# Patient Record
Sex: FEMALE | Race: WHITE | NOT HISPANIC OR LATINO | Employment: OTHER | URBAN - NONMETROPOLITAN AREA
[De-identification: names, ages, dates, MRNs, and addresses within clinical notes are randomized per-mention and may not be internally consistent; named-entity substitution may affect disease eponyms.]

---

## 2024-04-13 ENCOUNTER — APPOINTMENT (OUTPATIENT)
Dept: RADIOLOGY | Facility: HOSPITAL | Age: 69
DRG: 180 | End: 2024-04-13
Payer: MEDICARE

## 2024-04-13 ENCOUNTER — APPOINTMENT (OUTPATIENT)
Dept: CARDIOLOGY | Facility: HOSPITAL | Age: 69
DRG: 180 | End: 2024-04-13
Payer: MEDICARE

## 2024-04-13 ENCOUNTER — HOSPITAL ENCOUNTER (INPATIENT)
Facility: HOSPITAL | Age: 69
LOS: 9 days | Discharge: HOSPICE | DRG: 180 | End: 2024-04-25
Attending: EMERGENCY MEDICINE | Admitting: INTERNAL MEDICINE
Payer: MEDICARE

## 2024-04-13 DIAGNOSIS — R09.02 HYPOXEMIA: ICD-10-CM

## 2024-04-13 DIAGNOSIS — D64.9 ANEMIA, UNSPECIFIED TYPE: ICD-10-CM

## 2024-04-13 DIAGNOSIS — C79.89 SECONDARY MALIGNANT NEOPLASM OF OTHER SPECIFIED SITES (MULTI): ICD-10-CM

## 2024-04-13 DIAGNOSIS — J18.9 PNEUMONIA DUE TO INFECTIOUS ORGANISM, UNSPECIFIED LATERALITY, UNSPECIFIED PART OF LUNG: ICD-10-CM

## 2024-04-13 DIAGNOSIS — R90.89 ABNORMAL FINDING ON MRI OF BRAIN: ICD-10-CM

## 2024-04-13 DIAGNOSIS — J43.9 PULMONARY EMPHYSEMA, UNSPECIFIED EMPHYSEMA TYPE (MULTI): ICD-10-CM

## 2024-04-13 DIAGNOSIS — C34.90 PRIMARY MALIGNANT NEOPLASM OF LUNG METASTATIC TO OTHER SITE, UNSPECIFIED LATERALITY (MULTI): ICD-10-CM

## 2024-04-13 DIAGNOSIS — R55 SYNCOPE, NEAR: Primary | ICD-10-CM

## 2024-04-13 LAB
ALBUMIN SERPL BCP-MCNC: 2.9 G/DL (ref 3.4–5)
ALP SERPL-CCNC: 105 U/L (ref 33–136)
ALT SERPL W P-5'-P-CCNC: 9 U/L (ref 7–45)
ANION GAP BLDV CALCULATED.4IONS-SCNC: 13 MMOL/L (ref 10–25)
ANION GAP SERPL CALC-SCNC: 18 MMOL/L (ref 10–20)
AST SERPL W P-5'-P-CCNC: 13 U/L (ref 9–39)
BASE EXCESS BLDV CALC-SCNC: 2.9 MMOL/L (ref -2–3)
BASOPHILS # BLD AUTO: 0.03 X10*3/UL (ref 0–0.1)
BASOPHILS NFR BLD AUTO: 0.5 %
BILIRUB SERPL-MCNC: 1 MG/DL (ref 0–1.2)
BODY TEMPERATURE: ABNORMAL
BUN SERPL-MCNC: 15 MG/DL (ref 6–23)
CA-I BLDV-SCNC: 1.26 MMOL/L (ref 1.1–1.33)
CALCIUM SERPL-MCNC: 10 MG/DL (ref 8.6–10.3)
CARDIAC TROPONIN I PNL SERPL HS: 86 NG/L (ref 0–13)
CHLORIDE BLDV-SCNC: 97 MMOL/L (ref 98–107)
CHLORIDE SERPL-SCNC: 96 MMOL/L (ref 98–107)
CO2 SERPL-SCNC: 24 MMOL/L (ref 21–32)
CREAT SERPL-MCNC: 0.78 MG/DL (ref 0.5–1.05)
EGFRCR SERPLBLD CKD-EPI 2021: 83 ML/MIN/1.73M*2
EOSINOPHIL # BLD AUTO: 0.09 X10*3/UL (ref 0–0.7)
EOSINOPHIL NFR BLD AUTO: 1.4 %
ERYTHROCYTE [DISTWIDTH] IN BLOOD BY AUTOMATED COUNT: 18.5 % (ref 11.5–14.5)
FLUAV RNA RESP QL NAA+PROBE: NOT DETECTED
FLUBV RNA RESP QL NAA+PROBE: NOT DETECTED
GIANT PLATELETS BLD QL SMEAR: NORMAL
GLUCOSE BLDV-MCNC: 148 MG/DL (ref 74–99)
GLUCOSE SERPL-MCNC: 148 MG/DL (ref 74–99)
HCO3 BLDV-SCNC: 27.1 MMOL/L (ref 22–26)
HCT VFR BLD AUTO: 26 % (ref 36–46)
HCT VFR BLD EST: 26 % (ref 36–46)
HGB BLD-MCNC: 8.1 G/DL (ref 12–16)
HGB BLDV-MCNC: 8.8 G/DL (ref 12–16)
HOLD SPECIMEN: NORMAL
HOLD SPECIMEN: NORMAL
IMM GRANULOCYTES # BLD AUTO: 0.24 X10*3/UL (ref 0–0.7)
IMM GRANULOCYTES NFR BLD AUTO: 3.6 % (ref 0–0.9)
INHALED O2 CONCENTRATION: 32 %
LACTATE BLDV-SCNC: 6.4 MMOL/L (ref 0.4–2)
LACTATE SERPL-SCNC: 5.3 MMOL/L (ref 0.4–2)
LYMPHOCYTES # BLD AUTO: 0.8 X10*3/UL (ref 1.2–4.8)
LYMPHOCYTES NFR BLD AUTO: 12 %
MAGNESIUM SERPL-MCNC: 1.91 MG/DL (ref 1.6–2.4)
MCH RBC QN AUTO: 32.4 PG (ref 26–34)
MCHC RBC AUTO-ENTMCNC: 31.2 G/DL (ref 32–36)
MCV RBC AUTO: 104 FL (ref 80–100)
MONOCYTES # BLD AUTO: 0.86 X10*3/UL (ref 0.1–1)
MONOCYTES NFR BLD AUTO: 12.9 %
NEUTROPHILS # BLD AUTO: 4.64 X10*3/UL (ref 1.2–7.7)
NEUTROPHILS NFR BLD AUTO: 69.6 %
NRBC BLD-RTO: 0.6 /100 WBCS (ref 0–0)
OVALOCYTES BLD QL SMEAR: NORMAL
OXYHGB MFR BLDV: 80.2 % (ref 45–75)
PCO2 BLDV: 39 MM HG (ref 41–51)
PH BLDV: 7.45 PH (ref 7.33–7.43)
PLATELET # BLD AUTO: 93 X10*3/UL (ref 150–450)
PO2 BLDV: 58 MM HG (ref 35–45)
POLYCHROMASIA BLD QL SMEAR: NORMAL
POTASSIUM BLDV-SCNC: 3.9 MMOL/L (ref 3.5–5.3)
POTASSIUM SERPL-SCNC: 3 MMOL/L (ref 3.5–5.3)
PROT SERPL-MCNC: 5.8 G/DL (ref 6.4–8.2)
RBC # BLD AUTO: 2.5 X10*6/UL (ref 4–5.2)
RBC MORPH BLD: NORMAL
SAO2 % BLDV: 90 % (ref 45–75)
SARS-COV-2 RNA RESP QL NAA+PROBE: NOT DETECTED
SODIUM BLDV-SCNC: 133 MMOL/L (ref 136–145)
SODIUM SERPL-SCNC: 135 MMOL/L (ref 136–145)
WBC # BLD AUTO: 6.7 X10*3/UL (ref 4.4–11.3)

## 2024-04-13 PROCEDURE — 2500000005 HC RX 250 GENERAL PHARMACY W/O HCPCS: Performed by: EMERGENCY MEDICINE

## 2024-04-13 PROCEDURE — 96366 THER/PROPH/DIAG IV INF ADDON: CPT

## 2024-04-13 PROCEDURE — 84132 ASSAY OF SERUM POTASSIUM: CPT | Mod: 91 | Performed by: EMERGENCY MEDICINE

## 2024-04-13 PROCEDURE — 93005 ELECTROCARDIOGRAM TRACING: CPT

## 2024-04-13 PROCEDURE — 83735 ASSAY OF MAGNESIUM: CPT | Performed by: EMERGENCY MEDICINE

## 2024-04-13 PROCEDURE — 2500000004 HC RX 250 GENERAL PHARMACY W/ HCPCS (ALT 636 FOR OP/ED): Performed by: EMERGENCY MEDICINE

## 2024-04-13 PROCEDURE — 99285 EMERGENCY DEPT VISIT HI MDM: CPT | Mod: 25

## 2024-04-13 PROCEDURE — 71260 CT THORAX DX C+: CPT | Performed by: RADIOLOGY

## 2024-04-13 PROCEDURE — 74177 CT ABD & PELVIS W/CONTRAST: CPT | Performed by: RADIOLOGY

## 2024-04-13 PROCEDURE — 36415 COLL VENOUS BLD VENIPUNCTURE: CPT | Performed by: EMERGENCY MEDICINE

## 2024-04-13 PROCEDURE — 70450 CT HEAD/BRAIN W/O DYE: CPT

## 2024-04-13 PROCEDURE — 96365 THER/PROPH/DIAG IV INF INIT: CPT | Mod: 59

## 2024-04-13 PROCEDURE — 87636 SARSCOV2 & INF A&B AMP PRB: CPT | Performed by: EMERGENCY MEDICINE

## 2024-04-13 PROCEDURE — 83880 ASSAY OF NATRIURETIC PEPTIDE: CPT | Performed by: STUDENT IN AN ORGANIZED HEALTH CARE EDUCATION/TRAINING PROGRAM

## 2024-04-13 PROCEDURE — 70450 CT HEAD/BRAIN W/O DYE: CPT | Performed by: RADIOLOGY

## 2024-04-13 PROCEDURE — 85025 COMPLETE CBC W/AUTO DIFF WBC: CPT | Performed by: EMERGENCY MEDICINE

## 2024-04-13 PROCEDURE — 84132 ASSAY OF SERUM POTASSIUM: CPT | Performed by: EMERGENCY MEDICINE

## 2024-04-13 PROCEDURE — 83605 ASSAY OF LACTIC ACID: CPT | Performed by: EMERGENCY MEDICINE

## 2024-04-13 PROCEDURE — 2550000001 HC RX 255 CONTRASTS: Performed by: EMERGENCY MEDICINE

## 2024-04-13 PROCEDURE — 84484 ASSAY OF TROPONIN QUANT: CPT | Performed by: EMERGENCY MEDICINE

## 2024-04-13 PROCEDURE — 74177 CT ABD & PELVIS W/CONTRAST: CPT

## 2024-04-13 RX ORDER — NALOXONE HYDROCHLORIDE 4 MG/.1ML
4 SPRAY NASAL AS NEEDED
COMMUNITY
Start: 2024-03-23

## 2024-04-13 RX ORDER — GABAPENTIN 300 MG/1
300 CAPSULE ORAL 2 TIMES DAILY
COMMUNITY
Start: 2024-03-23 | End: 2024-06-21

## 2024-04-13 RX ORDER — HYDROMORPHONE HYDROCHLORIDE 16 MG/1
16 TABLET, EXTENDED RELEASE ORAL
COMMUNITY
Start: 2024-04-10 | End: 2024-05-10

## 2024-04-13 RX ORDER — HYDROMORPHONE HYDROCHLORIDE 2 MG/1
2-4 TABLET ORAL EVERY 4 HOURS PRN
COMMUNITY
Start: 2024-04-10 | End: 2024-04-25 | Stop reason: HOSPADM

## 2024-04-13 RX ORDER — ALBUTEROL SULFATE 90 UG/1
2 AEROSOL, METERED RESPIRATORY (INHALATION) EVERY 4 HOURS PRN
Status: ON HOLD | COMMUNITY
Start: 2024-03-23 | End: 2024-04-16

## 2024-04-13 RX ORDER — METHOCARBAMOL 500 MG/1
500 TABLET, FILM COATED ORAL
Status: ON HOLD | COMMUNITY
Start: 2024-03-25 | End: 2024-04-16

## 2024-04-13 RX ORDER — POTASSIUM CHLORIDE 14.9 MG/ML
20 INJECTION INTRAVENOUS ONCE
Status: COMPLETED | OUTPATIENT
Start: 2024-04-13 | End: 2024-04-14

## 2024-04-13 RX ADMIN — POTASSIUM CHLORIDE 20 MEQ: 14.9 INJECTION, SOLUTION INTRAVENOUS at 22:20

## 2024-04-13 RX ADMIN — IOHEXOL 75 ML: 350 INJECTION, SOLUTION INTRAVENOUS at 22:06

## 2024-04-13 RX ADMIN — SODIUM CHLORIDE 1000 ML: 9 INJECTION, SOLUTION INTRAVENOUS at 22:20

## 2024-04-13 RX ADMIN — Medication: at 23:55

## 2024-04-13 ASSESSMENT — PAIN DESCRIPTION - PAIN TYPE: TYPE: ACUTE PAIN

## 2024-04-13 ASSESSMENT — COLUMBIA-SUICIDE SEVERITY RATING SCALE - C-SSRS
6. HAVE YOU EVER DONE ANYTHING, STARTED TO DO ANYTHING, OR PREPARED TO DO ANYTHING TO END YOUR LIFE?: NO
1. IN THE PAST MONTH, HAVE YOU WISHED YOU WERE DEAD OR WISHED YOU COULD GO TO SLEEP AND NOT WAKE UP?: NO
2. HAVE YOU ACTUALLY HAD ANY THOUGHTS OF KILLING YOURSELF?: NO

## 2024-04-13 ASSESSMENT — PAIN SCALES - GENERAL: PAINLEVEL_OUTOF10: 5 - MODERATE PAIN

## 2024-04-13 ASSESSMENT — PAIN - FUNCTIONAL ASSESSMENT: PAIN_FUNCTIONAL_ASSESSMENT: 0-10

## 2024-04-14 LAB
ALBUMIN SERPL BCP-MCNC: 2.4 G/DL (ref 3.4–5)
ALP SERPL-CCNC: 78 U/L (ref 33–136)
ALT SERPL W P-5'-P-CCNC: 7 U/L (ref 7–45)
ANION GAP SERPL CALC-SCNC: 10 MMOL/L (ref 10–20)
APPEARANCE UR: CLEAR
AST SERPL W P-5'-P-CCNC: 6 U/L (ref 9–39)
BILIRUB SERPL-MCNC: 0.7 MG/DL (ref 0–1.2)
BILIRUB UR STRIP.AUTO-MCNC: NEGATIVE MG/DL
BNP SERPL-MCNC: 107 PG/ML (ref 0–99)
BUN SERPL-MCNC: 12 MG/DL (ref 6–23)
CALCIUM SERPL-MCNC: 8.9 MG/DL (ref 8.6–10.3)
CARDIAC TROPONIN I PNL SERPL HS: 80 NG/L (ref 0–13)
CARDIAC TROPONIN I PNL SERPL HS: 92 NG/L (ref 0–13)
CARDIAC TROPONIN I PNL SERPL HS: 98 NG/L (ref 0–13)
CHLORIDE SERPL-SCNC: 102 MMOL/L (ref 98–107)
CO2 SERPL-SCNC: 29 MMOL/L (ref 21–32)
COLOR UR: YELLOW
CREAT SERPL-MCNC: 0.58 MG/DL (ref 0.5–1.05)
EGFRCR SERPLBLD CKD-EPI 2021: >90 ML/MIN/1.73M*2
GLUCOSE SERPL-MCNC: 80 MG/DL (ref 74–99)
GLUCOSE UR STRIP.AUTO-MCNC: NEGATIVE MG/DL
KETONES UR STRIP.AUTO-MCNC: NEGATIVE MG/DL
LACTATE SERPL-SCNC: 1.4 MMOL/L (ref 0.4–2)
LEUKOCYTE ESTERASE UR QL STRIP.AUTO: NEGATIVE
MUCOUS THREADS #/AREA URNS AUTO: NORMAL /LPF
NITRITE UR QL STRIP.AUTO: NEGATIVE
PH UR STRIP.AUTO: 6.5 [PH]
POTASSIUM SERPL-SCNC: 3.3 MMOL/L (ref 3.5–5.3)
PROT SERPL-MCNC: 4.5 G/DL (ref 6.4–8.2)
PROT UR STRIP.AUTO-MCNC: ABNORMAL MG/DL
RBC # UR STRIP.AUTO: NEGATIVE /UL
RBC #/AREA URNS AUTO: NORMAL /HPF
SODIUM SERPL-SCNC: 138 MMOL/L (ref 136–145)
SP GR UR STRIP.AUTO: <1.005
UROBILINOGEN UR STRIP.AUTO-MCNC: ABNORMAL MG/DL
WBC #/AREA URNS AUTO: NORMAL /HPF

## 2024-04-14 PROCEDURE — 84484 ASSAY OF TROPONIN QUANT: CPT | Performed by: EMERGENCY MEDICINE

## 2024-04-14 PROCEDURE — 96375 TX/PRO/DX INJ NEW DRUG ADDON: CPT

## 2024-04-14 PROCEDURE — 80053 COMPREHEN METABOLIC PANEL: CPT | Performed by: STUDENT IN AN ORGANIZED HEALTH CARE EDUCATION/TRAINING PROGRAM

## 2024-04-14 PROCEDURE — 2500000002 HC RX 250 W HCPCS SELF ADMINISTERED DRUGS (ALT 637 FOR MEDICARE OP, ALT 636 FOR OP/ED)

## 2024-04-14 PROCEDURE — 36415 COLL VENOUS BLD VENIPUNCTURE: CPT | Performed by: EMERGENCY MEDICINE

## 2024-04-14 PROCEDURE — 81001 URINALYSIS AUTO W/SCOPE: CPT | Performed by: EMERGENCY MEDICINE

## 2024-04-14 PROCEDURE — 2500000005 HC RX 250 GENERAL PHARMACY W/O HCPCS: Performed by: EMERGENCY MEDICINE

## 2024-04-14 PROCEDURE — 84484 ASSAY OF TROPONIN QUANT: CPT | Mod: 91 | Performed by: STUDENT IN AN ORGANIZED HEALTH CARE EDUCATION/TRAINING PROGRAM

## 2024-04-14 PROCEDURE — 96361 HYDRATE IV INFUSION ADD-ON: CPT

## 2024-04-14 PROCEDURE — 83605 ASSAY OF LACTIC ACID: CPT | Performed by: EMERGENCY MEDICINE

## 2024-04-14 PROCEDURE — 2500000001 HC RX 250 WO HCPCS SELF ADMINISTERED DRUGS (ALT 637 FOR MEDICARE OP): Performed by: STUDENT IN AN ORGANIZED HEALTH CARE EDUCATION/TRAINING PROGRAM

## 2024-04-14 PROCEDURE — 2500000004 HC RX 250 GENERAL PHARMACY W/ HCPCS (ALT 636 FOR OP/ED)

## 2024-04-14 PROCEDURE — 2500000001 HC RX 250 WO HCPCS SELF ADMINISTERED DRUGS (ALT 637 FOR MEDICARE OP): Performed by: EMERGENCY MEDICINE

## 2024-04-14 PROCEDURE — 2500000004 HC RX 250 GENERAL PHARMACY W/ HCPCS (ALT 636 FOR OP/ED): Performed by: STUDENT IN AN ORGANIZED HEALTH CARE EDUCATION/TRAINING PROGRAM

## 2024-04-14 RX ORDER — POTASSIUM CHLORIDE 20 MEQ/1
20 TABLET, EXTENDED RELEASE ORAL ONCE
Status: COMPLETED | OUTPATIENT
Start: 2024-04-14 | End: 2024-04-14

## 2024-04-14 RX ORDER — HYDROMORPHONE HYDROCHLORIDE 16 MG/1
16 TABLET, EXTENDED RELEASE ORAL
Status: DISCONTINUED | OUTPATIENT
Start: 2024-04-15 | End: 2024-04-25 | Stop reason: HOSPADM

## 2024-04-14 RX ORDER — HYDROMORPHONE HYDROCHLORIDE 1 MG/ML
INJECTION, SOLUTION INTRAMUSCULAR; INTRAVENOUS; SUBCUTANEOUS
Status: COMPLETED
Start: 2024-04-14 | End: 2024-04-14

## 2024-04-14 RX ORDER — HYDROMORPHONE HYDROCHLORIDE 2 MG/1
16 TABLET ORAL ONCE
Status: DISCONTINUED | OUTPATIENT
Start: 2024-04-14 | End: 2024-04-14

## 2024-04-14 RX ORDER — GABAPENTIN 300 MG/1
300 CAPSULE ORAL NIGHTLY
Status: DISCONTINUED | OUTPATIENT
Start: 2024-04-14 | End: 2024-04-18

## 2024-04-14 RX ORDER — CLINDAMYCIN HYDROCHLORIDE 300 MG/1
300 CAPSULE ORAL 3 TIMES DAILY
Qty: 30 CAPSULE | Refills: 0 | Status: SHIPPED | OUTPATIENT
Start: 2024-04-14 | End: 2024-04-14 | Stop reason: ENTERED-IN-ERROR

## 2024-04-14 RX ORDER — METHOCARBAMOL 500 MG/1
500 TABLET, FILM COATED ORAL EVERY 8 HOURS SCHEDULED
Status: DISCONTINUED | OUTPATIENT
Start: 2024-04-14 | End: 2024-04-14

## 2024-04-14 RX ORDER — HYDROMORPHONE HYDROCHLORIDE 2 MG/1
2 TABLET ORAL EVERY 4 HOURS PRN
Status: DISCONTINUED | OUTPATIENT
Start: 2024-04-14 | End: 2024-04-24

## 2024-04-14 RX ORDER — NAPROXEN SODIUM 220 MG/1
324 TABLET, FILM COATED ORAL ONCE
Status: COMPLETED | OUTPATIENT
Start: 2024-04-14 | End: 2024-04-14

## 2024-04-14 RX ORDER — METHOCARBAMOL 500 MG/1
500 TABLET, FILM COATED ORAL NIGHTLY
Status: DISCONTINUED | OUTPATIENT
Start: 2024-04-14 | End: 2024-04-16

## 2024-04-14 RX ORDER — KETOROLAC TROMETHAMINE 10 MG/1
10 TABLET, FILM COATED ORAL EVERY 6 HOURS PRN
Qty: 20 TABLET | Refills: 0 | Status: SHIPPED | OUTPATIENT
Start: 2024-04-14 | End: 2024-04-14 | Stop reason: ENTERED-IN-ERROR

## 2024-04-14 RX ORDER — FENTANYL CITRATE 50 UG/ML
100 INJECTION, SOLUTION INTRAMUSCULAR; INTRAVENOUS ONCE
Status: COMPLETED | OUTPATIENT
Start: 2024-04-14 | End: 2024-04-14

## 2024-04-14 RX ORDER — FENTANYL CITRATE 50 UG/ML
INJECTION, SOLUTION INTRAMUSCULAR; INTRAVENOUS
Status: COMPLETED
Start: 2024-04-14 | End: 2024-04-14

## 2024-04-14 RX ORDER — POTASSIUM CHLORIDE 20 MEQ/1
TABLET, EXTENDED RELEASE ORAL
Status: COMPLETED
Start: 2024-04-14 | End: 2024-04-14

## 2024-04-14 RX ORDER — GABAPENTIN 300 MG/1
300 CAPSULE ORAL 3 TIMES DAILY PRN
Status: DISCONTINUED | OUTPATIENT
Start: 2024-04-14 | End: 2024-04-20

## 2024-04-14 RX ORDER — METHOCARBAMOL 500 MG/1
TABLET, FILM COATED ORAL
Status: DISPENSED
Start: 2024-04-14 | End: 2024-04-14

## 2024-04-14 RX ORDER — HYDROMORPHONE HYDROCHLORIDE 1 MG/ML
1 INJECTION, SOLUTION INTRAMUSCULAR; INTRAVENOUS; SUBCUTANEOUS ONCE
Status: COMPLETED | OUTPATIENT
Start: 2024-04-14 | End: 2024-04-14

## 2024-04-14 RX ADMIN — GABAPENTIN 300 MG: 300 CAPSULE ORAL at 01:02

## 2024-04-14 RX ADMIN — METHOCARBAMOL 500 MG: 500 TABLET ORAL at 21:22

## 2024-04-14 RX ADMIN — GABAPENTIN 300 MG: 300 CAPSULE ORAL at 10:42

## 2024-04-14 RX ADMIN — HYDROMORPHONE HYDROCHLORIDE 1 MG: 1 INJECTION, SOLUTION INTRAMUSCULAR; INTRAVENOUS; SUBCUTANEOUS at 09:56

## 2024-04-14 RX ADMIN — Medication: at 20:00

## 2024-04-14 RX ADMIN — POTASSIUM CHLORIDE 20 MEQ: 20 TABLET, EXTENDED RELEASE ORAL at 11:55

## 2024-04-14 RX ADMIN — FENTANYL CITRATE 100 MCG: 50 INJECTION, SOLUTION INTRAMUSCULAR; INTRAVENOUS at 09:31

## 2024-04-14 RX ADMIN — HYDROMORPHONE HYDROCHLORIDE 16 MG: 2 TABLET ORAL at 09:25

## 2024-04-14 RX ADMIN — FENTANYL CITRATE 100 MCG: 50 INJECTION INTRAMUSCULAR; INTRAVENOUS at 09:31

## 2024-04-14 RX ADMIN — POTASSIUM CHLORIDE 20 MEQ: 1500 TABLET, EXTENDED RELEASE ORAL at 11:55

## 2024-04-14 RX ADMIN — GABAPENTIN 300 MG: 300 CAPSULE ORAL at 21:22

## 2024-04-14 RX ADMIN — SODIUM CHLORIDE 1000 ML: 9 INJECTION, SOLUTION INTRAVENOUS at 09:59

## 2024-04-14 RX ADMIN — HYDROMORPHONE HYDROCHLORIDE 2 MG: 2 TABLET ORAL at 18:05

## 2024-04-14 RX ADMIN — ASPIRIN 81 MG CHEWABLE TABLET 324 MG: 81 TABLET CHEWABLE at 03:18

## 2024-04-14 ASSESSMENT — PAIN SCALES - GENERAL
PAINLEVEL_OUTOF10: 0 - NO PAIN
PAINLEVEL_OUTOF10: 10 - WORST POSSIBLE PAIN
PAINLEVEL_OUTOF10: 0 - NO PAIN
PAINLEVEL_OUTOF10: 8

## 2024-04-14 ASSESSMENT — PAIN DESCRIPTION - LOCATION
LOCATION: BREAST
LOCATION: ABDOMEN

## 2024-04-14 ASSESSMENT — PAIN SCALES - WONG BAKER: WONGBAKER_NUMERICALRESPONSE: NO HURT

## 2024-04-14 ASSESSMENT — PAIN DESCRIPTION - ORIENTATION: ORIENTATION: RIGHT

## 2024-04-14 ASSESSMENT — PAIN - FUNCTIONAL ASSESSMENT
PAIN_FUNCTIONAL_ASSESSMENT: WONG-BAKER FACES
PAIN_FUNCTIONAL_ASSESSMENT: 0-10
PAIN_FUNCTIONAL_ASSESSMENT: 0-10

## 2024-04-14 NOTE — ED PROVIDER NOTES
HPI   Chief Complaint   Patient presents with    Syncope       Patient has known metastatic lung cancer, thought to be small cell, and being treated at [facility].  She came in today by EMS because of 2 episodes of what sounds to be vasovagal syncope while trying to sit on the toilet.  This also happened a couple weeks ago 1 time.  She denies any headache chest pain or shortness of breath but is very weak and has not been eating or drinking much.  The episode started about the same time she was started on Dilaudid at home.  She currently takes that once a day 16 mg Dilaudid extended release as well as 1 to 2 mg orally every 3-4 hours for breakthrough pain.  Her  requested transfer to [facility] and I explained that we have to do a few tests before we pursue that.                        Sharee Coma Scale Score: 15                     Patient History   History reviewed. No pertinent past medical history.  Past Surgical History:   Procedure Laterality Date    APPENDECTOMY  01/29/2016    Appendectomy    LUNG LOBECTOMY  01/29/2016    Lung Lobectomy    OTHER SURGICAL HISTORY  11/15/2013    Thoracoscopy (Therapeutic)     No family history on file.  Social History     Tobacco Use    Smoking status: Never    Smokeless tobacco: Current   Substance Use Topics    Alcohol use: Never    Drug use: Never       Physical Exam   ED Triage Vitals   Temperature Heart Rate Respirations BP   04/13/24 2003 04/13/24 2003 04/13/24 2003 04/13/24 2003   36.9 °C (98.4 °F) (!) 127 19 (!) 123/95      Pulse Ox Temp Source Heart Rate Source Patient Position   04/13/24 2003 04/13/24 2003 04/13/24 2056 04/13/24 2056   (!) 88 % Temporal Monitor Lying      BP Location FiO2 (%)     04/13/24 2056 04/13/24 2037     Right arm 32 %       Physical Exam  Constitutional:       Appearance: She is ill-appearing.   HENT:      Head: Normocephalic and atraumatic.   Eyes:      Extraocular Movements: Extraocular movements intact.      Pupils:  Pupils are equal, round, and reactive to light.   Cardiovascular:      Rate and Rhythm: Normal rate and regular rhythm.   Pulmonary:      Breath sounds: Rhonchi present.   Abdominal:      General: Abdomen is flat. Bowel sounds are normal.   Musculoskeletal:         General: Normal range of motion.   Skin:     Coloration: Skin is pale.   Neurological:      Mental Status: She is oriented to person, place, and time.         ED Course & MDM   Diagnoses as of 04/14/24 0253   Pain, dental       Medical Decision Making      Procedure  Procedures     Angel Coronado MD  04/13/24 4297       Angel Coronado MD  04/14/24 0134       Angel Coronado MD  04/14/24 0401

## 2024-04-15 LAB
ABO GROUP (TYPE) IN BLOOD: NORMAL
ABO GROUP (TYPE) IN BLOOD: NORMAL
ANION GAP SERPL CALC-SCNC: 13 MMOL/L (ref 10–20)
ANION GAP SERPL CALC-SCNC: 9 MMOL/L (ref 10–20)
ANTIBODY SCREEN: NORMAL
BASOPHILS # BLD AUTO: 0.03 X10*3/UL (ref 0–0.1)
BASOPHILS NFR BLD AUTO: 0.4 %
BLOOD EXPIRATION DATE: NORMAL
BLOOD EXPIRATION DATE: NORMAL
BUN SERPL-MCNC: 10 MG/DL (ref 6–23)
BUN SERPL-MCNC: 9 MG/DL (ref 6–23)
CALCIUM SERPL-MCNC: 9.2 MG/DL (ref 8.6–10.3)
CALCIUM SERPL-MCNC: 9.4 MG/DL (ref 8.6–10.3)
CARDIAC TROPONIN I PNL SERPL HS: 154 NG/L (ref 0–13)
CHLORIDE SERPL-SCNC: 102 MMOL/L (ref 98–107)
CHLORIDE SERPL-SCNC: 103 MMOL/L (ref 98–107)
CO2 SERPL-SCNC: 26 MMOL/L (ref 21–32)
CO2 SERPL-SCNC: 29 MMOL/L (ref 21–32)
CREAT SERPL-MCNC: 0.56 MG/DL (ref 0.5–1.05)
CREAT SERPL-MCNC: 0.63 MG/DL (ref 0.5–1.05)
DISPENSE STATUS: NORMAL
DISPENSE STATUS: NORMAL
EGFRCR SERPLBLD CKD-EPI 2021: >90 ML/MIN/1.73M*2
EGFRCR SERPLBLD CKD-EPI 2021: >90 ML/MIN/1.73M*2
EOSINOPHIL # BLD AUTO: 0.22 X10*3/UL (ref 0–0.7)
EOSINOPHIL NFR BLD AUTO: 3.3 %
ERYTHROCYTE [DISTWIDTH] IN BLOOD BY AUTOMATED COUNT: 18.6 % (ref 11.5–14.5)
ERYTHROCYTE [DISTWIDTH] IN BLOOD BY AUTOMATED COUNT: 23 % (ref 11.5–14.5)
ERYTHROCYTE [DISTWIDTH] IN BLOOD BY AUTOMATED COUNT: 23.2 % (ref 11.5–14.5)
GLUCOSE SERPL-MCNC: 88 MG/DL (ref 74–99)
GLUCOSE SERPL-MCNC: 96 MG/DL (ref 74–99)
HCT VFR BLD AUTO: 18.8 % (ref 36–46)
HCT VFR BLD AUTO: 26.4 % (ref 36–46)
HCT VFR BLD AUTO: 27 % (ref 36–46)
HGB BLD-MCNC: 5.9 G/DL (ref 12–16)
HGB BLD-MCNC: 8.5 G/DL (ref 12–16)
HGB BLD-MCNC: 8.9 G/DL (ref 12–16)
IMM GRANULOCYTES # BLD AUTO: 0.29 X10*3/UL (ref 0–0.7)
IMM GRANULOCYTES NFR BLD AUTO: 4.3 % (ref 0–0.9)
LACTATE SERPL-SCNC: 1.1 MMOL/L (ref 0.4–2)
LYMPHOCYTES # BLD AUTO: 0.67 X10*3/UL (ref 1.2–4.8)
LYMPHOCYTES NFR BLD AUTO: 10 %
MCH RBC QN AUTO: 30.6 PG (ref 26–34)
MCH RBC QN AUTO: 30.7 PG (ref 26–34)
MCH RBC QN AUTO: 32.4 PG (ref 26–34)
MCHC RBC AUTO-ENTMCNC: 31.4 G/DL (ref 32–36)
MCHC RBC AUTO-ENTMCNC: 31.5 G/DL (ref 32–36)
MCHC RBC AUTO-ENTMCNC: 33.7 G/DL (ref 32–36)
MCV RBC AUTO: 103 FL (ref 80–100)
MCV RBC AUTO: 91 FL (ref 80–100)
MCV RBC AUTO: 97 FL (ref 80–100)
MONOCYTES # BLD AUTO: 0.83 X10*3/UL (ref 0.1–1)
MONOCYTES NFR BLD AUTO: 12.4 %
NEUTROPHILS # BLD AUTO: 4.64 X10*3/UL (ref 1.2–7.7)
NEUTROPHILS NFR BLD AUTO: 69.6 %
NRBC BLD-RTO: 0 /100 WBCS (ref 0–0)
NRBC BLD-RTO: 0.3 /100 WBCS (ref 0–0)
NRBC BLD-RTO: 0.4 /100 WBCS (ref 0–0)
OVALOCYTES BLD QL SMEAR: NORMAL
PLATELET # BLD AUTO: 71 X10*3/UL (ref 150–450)
PLATELET # BLD AUTO: 73 X10*3/UL (ref 150–450)
PLATELET # BLD AUTO: 79 X10*3/UL (ref 150–450)
PLATELET CLUMP BLD QL SMEAR: PRESENT
POLYCHROMASIA BLD QL SMEAR: NORMAL
POTASSIUM SERPL-SCNC: 2.8 MMOL/L (ref 3.5–5.3)
POTASSIUM SERPL-SCNC: 3.1 MMOL/L (ref 3.5–5.3)
PRODUCT BLOOD TYPE: 600
PRODUCT BLOOD TYPE: 6200
PRODUCT CODE: NORMAL
PRODUCT CODE: NORMAL
RBC # BLD AUTO: 1.82 X10*6/UL (ref 4–5.2)
RBC # BLD AUTO: 2.78 X10*6/UL (ref 4–5.2)
RBC # BLD AUTO: 2.9 X10*6/UL (ref 4–5.2)
RBC MORPH BLD: NORMAL
RH FACTOR (ANTIGEN D): NORMAL
RH FACTOR (ANTIGEN D): NORMAL
SODIUM SERPL-SCNC: 138 MMOL/L (ref 136–145)
SODIUM SERPL-SCNC: 138 MMOL/L (ref 136–145)
UNIT ABO: NORMAL
UNIT ABO: NORMAL
UNIT NUMBER: NORMAL
UNIT NUMBER: NORMAL
UNIT RH: NORMAL
UNIT RH: NORMAL
UNIT VOLUME: 500
UNIT VOLUME: 500
WBC # BLD AUTO: 4.6 X10*3/UL (ref 4.4–11.3)
WBC # BLD AUTO: 6.3 X10*3/UL (ref 4.4–11.3)
WBC # BLD AUTO: 6.7 X10*3/UL (ref 4.4–11.3)
XM INTEP: NORMAL
XM INTEP: NORMAL

## 2024-04-15 PROCEDURE — 2500000004 HC RX 250 GENERAL PHARMACY W/ HCPCS (ALT 636 FOR OP/ED): Performed by: EMERGENCY MEDICINE

## 2024-04-15 PROCEDURE — 96375 TX/PRO/DX INJ NEW DRUG ADDON: CPT

## 2024-04-15 PROCEDURE — 84484 ASSAY OF TROPONIN QUANT: CPT | Performed by: EMERGENCY MEDICINE

## 2024-04-15 PROCEDURE — 85027 COMPLETE CBC AUTOMATED: CPT | Mod: 59 | Performed by: EMERGENCY MEDICINE

## 2024-04-15 PROCEDURE — 36415 COLL VENOUS BLD VENIPUNCTURE: CPT | Performed by: EMERGENCY MEDICINE

## 2024-04-15 PROCEDURE — 2500000005 HC RX 250 GENERAL PHARMACY W/O HCPCS: Performed by: EMERGENCY MEDICINE

## 2024-04-15 PROCEDURE — 86920 COMPATIBILITY TEST SPIN: CPT

## 2024-04-15 PROCEDURE — P9040 RBC LEUKOREDUCED IRRADIATED: HCPCS

## 2024-04-15 PROCEDURE — 2500000001 HC RX 250 WO HCPCS SELF ADMINISTERED DRUGS (ALT 637 FOR MEDICARE OP): Performed by: STUDENT IN AN ORGANIZED HEALTH CARE EDUCATION/TRAINING PROGRAM

## 2024-04-15 PROCEDURE — 96365 THER/PROPH/DIAG IV INF INIT: CPT

## 2024-04-15 PROCEDURE — 83605 ASSAY OF LACTIC ACID: CPT | Performed by: EMERGENCY MEDICINE

## 2024-04-15 PROCEDURE — 87040 BLOOD CULTURE FOR BACTERIA: CPT | Mod: GENLAB | Performed by: EMERGENCY MEDICINE

## 2024-04-15 PROCEDURE — 80048 BASIC METABOLIC PNL TOTAL CA: CPT | Performed by: EMERGENCY MEDICINE

## 2024-04-15 PROCEDURE — 96366 THER/PROPH/DIAG IV INF ADDON: CPT

## 2024-04-15 PROCEDURE — 86900 BLOOD TYPING SEROLOGIC ABO: CPT | Mod: 91 | Performed by: EMERGENCY MEDICINE

## 2024-04-15 PROCEDURE — 2500000004 HC RX 250 GENERAL PHARMACY W/ HCPCS (ALT 636 FOR OP/ED)

## 2024-04-15 PROCEDURE — 36430 TRANSFUSION BLD/BLD COMPNT: CPT

## 2024-04-15 PROCEDURE — 85027 COMPLETE CBC AUTOMATED: CPT | Mod: 59,91 | Performed by: EMERGENCY MEDICINE

## 2024-04-15 PROCEDURE — 80048 BASIC METABOLIC PNL TOTAL CA: CPT | Mod: 91 | Performed by: EMERGENCY MEDICINE

## 2024-04-15 PROCEDURE — 85025 COMPLETE CBC W/AUTO DIFF WBC: CPT | Performed by: EMERGENCY MEDICINE

## 2024-04-15 PROCEDURE — 2500000001 HC RX 250 WO HCPCS SELF ADMINISTERED DRUGS (ALT 637 FOR MEDICARE OP): Performed by: EMERGENCY MEDICINE

## 2024-04-15 RX ORDER — ACETAMINOPHEN 325 MG/1
650 TABLET ORAL ONCE
Status: COMPLETED | OUTPATIENT
Start: 2024-04-15 | End: 2024-04-15

## 2024-04-15 RX ORDER — POTASSIUM CHLORIDE 14.9 MG/ML
20 INJECTION INTRAVENOUS ONCE
Status: COMPLETED | OUTPATIENT
Start: 2024-04-15 | End: 2024-04-15

## 2024-04-15 RX ORDER — LORAZEPAM 2 MG/ML
INJECTION INTRAMUSCULAR
Status: COMPLETED
Start: 2024-04-15 | End: 2024-04-15

## 2024-04-15 RX ORDER — LORAZEPAM 2 MG/ML
1 INJECTION INTRAMUSCULAR ONCE
Status: COMPLETED | OUTPATIENT
Start: 2024-04-15 | End: 2024-04-15

## 2024-04-15 RX ORDER — POTASSIUM CHLORIDE 14.9 MG/ML
20 INJECTION INTRAVENOUS
Status: DISPENSED | OUTPATIENT
Start: 2024-04-15 | End: 2024-04-15

## 2024-04-15 RX ADMIN — GABAPENTIN 300 MG: 300 CAPSULE ORAL at 11:49

## 2024-04-15 RX ADMIN — PIPERACILLIN SODIUM AND TAZOBACTAM SODIUM 3.38 G: 3; .375 INJECTION, SOLUTION INTRAVENOUS at 14:39

## 2024-04-15 RX ADMIN — Medication: at 20:00

## 2024-04-15 RX ADMIN — POTASSIUM CHLORIDE 20 MEQ: 14.9 INJECTION, SOLUTION INTRAVENOUS at 19:41

## 2024-04-15 RX ADMIN — HYDROMORPHONE HYDROCHLORIDE 2 MG: 2 TABLET ORAL at 14:39

## 2024-04-15 RX ADMIN — PIPERACILLIN SODIUM AND TAZOBACTAM SODIUM 3.38 G: 3; .375 INJECTION, SOLUTION INTRAVENOUS at 23:39

## 2024-04-15 RX ADMIN — ACETAMINOPHEN 650 MG: 325 TABLET ORAL at 02:23

## 2024-04-15 RX ADMIN — PIPERACILLIN SODIUM AND TAZOBACTAM SODIUM 3.38 G: 3; .375 INJECTION, SOLUTION INTRAVENOUS at 10:08

## 2024-04-15 RX ADMIN — GABAPENTIN 300 MG: 300 CAPSULE ORAL at 23:39

## 2024-04-15 RX ADMIN — POTASSIUM CHLORIDE 20 MEQ: 14.9 INJECTION, SOLUTION INTRAVENOUS at 03:35

## 2024-04-15 RX ADMIN — Medication 2 L/MIN: at 08:00

## 2024-04-15 RX ADMIN — SODIUM CHLORIDE 500 ML: 9 INJECTION, SOLUTION INTRAVENOUS at 02:02

## 2024-04-15 RX ADMIN — LORAZEPAM 1 MG: 2 INJECTION, SOLUTION INTRAMUSCULAR; INTRAVENOUS at 00:30

## 2024-04-15 RX ADMIN — HYDROMORPHONE HYDROCHLORIDE 16 MG: 16 TABLET, EXTENDED RELEASE ORAL at 10:07

## 2024-04-15 RX ADMIN — PIPERACILLIN SODIUM AND TAZOBACTAM SODIUM 3.38 G: 3; .375 INJECTION, SOLUTION INTRAVENOUS at 03:00

## 2024-04-15 RX ADMIN — POTASSIUM CHLORIDE 20 MEQ: 14.9 INJECTION, SOLUTION INTRAVENOUS at 06:29

## 2024-04-15 RX ADMIN — LORAZEPAM 1 MG: 2 INJECTION INTRAMUSCULAR at 00:30

## 2024-04-15 ASSESSMENT — PAIN DESCRIPTION - LOCATION: LOCATION: BACK

## 2024-04-15 ASSESSMENT — PAIN SCALES - GENERAL
PAINLEVEL_OUTOF10: 0 - NO PAIN
PAINLEVEL_OUTOF10: 0 - NO PAIN
PAINLEVEL_OUTOF10: 3
PAINLEVEL_OUTOF10: 0 - NO PAIN
PAINLEVEL_OUTOF10: 5 - MODERATE PAIN

## 2024-04-15 ASSESSMENT — PAIN - FUNCTIONAL ASSESSMENT: PAIN_FUNCTIONAL_ASSESSMENT: 0-10

## 2024-04-15 NOTE — ED PROVIDER NOTES
[facility]  Department of Emergency Medicine   ED  Provider Note  4/13/2024  8:03 PM  AC03/AC03                  Malathi Rubio was signed out by Dr. Coronado at shift change pending Transfer to [place]    History of Present Illness:   Malathi Rubio is a 68 y.o. female presenting to the ED for Generalized weakness and syncope, beginning Several days ago.  The complaint has been Persistent, severe in severity, and worsened by standing.  Patient has metastatic small cell cancer.  She has had multiple hospitalizations for the same.  While going the bathroom 2 days ago she became syncopal and passed out.  Her hemoglobin is noted to be low and she has been transfused a few times over the past 2 days.  Her last hemoglobin earlier today was 8.9.  It was 5.9 before the transfusion.      Review of Systems:   Pertinent positives and review of systems as noted above.  Remaining 10 review of systems is negative or noncontributory to today's episode of care.    PC Physical:    Alert woman resting quietly  Pale in color  Lung sounds diminished  Heart tones about 100 bpm  Skin decreased turgor  Soft nontender    --------------------------------------------- PAST HISTORY ---------------------------------------------  Past Medical History:  has no past medical history on file.    Past Surgical History:  has a past surgical history that includes Other surgical history (11/15/2013); Lung lobectomy (01/29/2016); and Appendectomy (01/29/2016).    Social History:  reports that she has never smoked. She uses smokeless tobacco. She reports that she does not drink alcohol and does not use drugs.    Family History: family history is not on file. Unless otherwise noted, family history is non contributory    The patient’s home medications have been reviewed.    Allergies: Patient has no known allergies.    -------------------------------------------------- RESULTS -------------------------------------------------  All laboratory and  radiology results have been personally reviewed by myself   LABS:  Results for orders placed or performed during the hospital encounter of 04/13/24   CBC and Auto Differential   Result Value Ref Range    WBC 6.7 4.4 - 11.3 x10*3/uL    nRBC 0.6 (H) 0.0 - 0.0 /100 WBCs    RBC 2.50 (L) 4.00 - 5.20 x10*6/uL    Hemoglobin 8.1 (L) 12.0 - 16.0 g/dL    Hematocrit 26.0 (L) 36.0 - 46.0 %     (H) 80 - 100 fL    MCH 32.4 26.0 - 34.0 pg    MCHC 31.2 (L) 32.0 - 36.0 g/dL    RDW 18.5 (H) 11.5 - 14.5 %    Platelets 93 (L) 150 - 450 x10*3/uL    Neutrophils % 69.6 40.0 - 80.0 %    Immature Granulocytes %, Automated 3.6 (H) 0.0 - 0.9 %    Lymphocytes % 12.0 13.0 - 44.0 %    Monocytes % 12.9 2.0 - 10.0 %    Eosinophils % 1.4 0.0 - 6.0 %    Basophils % 0.5 0.0 - 2.0 %    Neutrophils Absolute 4.64 1.20 - 7.70 x10*3/uL    Immature Granulocytes Absolute, Automated 0.24 0.00 - 0.70 x10*3/uL    Lymphocytes Absolute 0.80 (L) 1.20 - 4.80 x10*3/uL    Monocytes Absolute 0.86 0.10 - 1.00 x10*3/uL    Eosinophils Absolute 0.09 0.00 - 0.70 x10*3/uL    Basophils Absolute 0.03 0.00 - 0.10 x10*3/uL   Comprehensive metabolic panel   Result Value Ref Range    Glucose 148 (H) 74 - 99 mg/dL    Sodium 135 (L) 136 - 145 mmol/L    Potassium 3.0 (L) 3.5 - 5.3 mmol/L    Chloride 96 (L) 98 - 107 mmol/L    Bicarbonate 24 21 - 32 mmol/L    Anion Gap 18 10 - 20 mmol/L    Urea Nitrogen 15 6 - 23 mg/dL    Creatinine 0.78 0.50 - 1.05 mg/dL    eGFR 83 >60 mL/min/1.73m*2    Calcium 10.0 8.6 - 10.3 mg/dL    Albumin 2.9 (L) 3.4 - 5.0 g/dL    Alkaline Phosphatase 105 33 - 136 U/L    Total Protein 5.8 (L) 6.4 - 8.2 g/dL    AST 13 9 - 39 U/L    Bilirubin, Total 1.0 0.0 - 1.2 mg/dL    ALT 9 7 - 45 U/L   Magnesium   Result Value Ref Range    Magnesium 1.91 1.60 - 2.40 mg/dL   BLOOD GAS VENOUS FULL PANEL   Result Value Ref Range    POCT pH, Venous 7.45 (H) 7.33 - 7.43 pH    POCT pCO2, Venous 39 (L) 41 - 51 mm Hg    POCT pO2, Venous 58 (H) 35 - 45 mm Hg    POCT SO2,  Venous 90 (H) 45 - 75 %    POCT Oxy Hemoglobin, Venous 80.2 (H) 45.0 - 75.0 %    POCT Hematocrit Calculated, Venous 26.0 (L) 36.0 - 46.0 %    POCT Sodium, Venous 133 (L) 136 - 145 mmol/L    POCT Potassium, Venous 3.9 3.5 - 5.3 mmol/L    POCT Chloride, Venous 97 (L) 98 - 107 mmol/L    POCT Ionized Calicum, Venous 1.26 1.10 - 1.33 mmol/L    POCT Glucose, Venous 148 (H) 74 - 99 mg/dL    POCT Lactate, Venous 6.4 (HH) 0.4 - 2.0 mmol/L    POCT Base Excess, Venous 2.9 -2.0 - 3.0 mmol/L    POCT HCO3 Calculated, Venous 27.1 (H) 22.0 - 26.0 mmol/L    POCT Hemoglobin, Venous 8.8 (L) 12.0 - 16.0 g/dL    POCT Anion Gap, Venous 13.0 10.0 - 25.0 mmol/L    Patient Temperature      FiO2 32 %   Lactate   Result Value Ref Range    Lactate 5.3 (HH) 0.4 - 2.0 mmol/L   Light Blue Top   Result Value Ref Range    Extra Tube Hold for add-ons.    Lavender Top   Result Value Ref Range    Extra Tube Hold for add-ons.    Lactate   Result Value Ref Range    Lactate 1.4 0.4 - 2.0 mmol/L   Urinalysis with Reflex Microscopic   Result Value Ref Range    Color, Urine Yellow Straw, Yellow    Appearance, Urine Clear Clear    Specific Gravity, Urine <1.005 (A) 1.005 - 1.035    pH, Urine 6.5 5.0, 5.5, 6.0, 6.5, 7.0, 7.5, 8.0    Protein, Urine 10 (TRACE) NEGATIVE, 10 (TRACE) mg/dL    Glucose, Urine NEGATIVE NEGATIVE mg/dL    Blood, Urine NEGATIVE NEGATIVE    Ketones, Urine NEGATIVE NEGATIVE mg/dL    Bilirubin, Urine NEGATIVE NEGATIVE    Urobilinogen, Urine NORM NORM mg/dL    Nitrite, Urine NEGATIVE NEGATIVE    Leukocyte Esterase, Urine NEGATIVE NEGATIVE   Sars-CoV-2 and Influenza A/B PCR   Result Value Ref Range    Flu A Result Not Detected Not Detected    Flu B Result Not Detected Not Detected    Coronavirus 2019, PCR Not Detected Not Detected   Troponin I, High Sensitivity   Result Value Ref Range    Troponin I, High Sensitivity 86 (HH) 0 - 13 ng/L   Troponin I, High Sensitivity   Result Value Ref Range    Troponin I, High Sensitivity 98 (HH) 0 - 13  ng/L   Troponin I, High Sensitivity   Result Value Ref Range    Troponin I, High Sensitivity 92 (HH) 0 - 13 ng/L   Microscopic Only, Urine   Result Value Ref Range    WBC, Urine 1-5 1-5, NONE /HPF    RBC, Urine 1-2 NONE, 1-2, 3-5 /HPF    Mucus, Urine FEW Reference range not established. /LPF   Comprehensive metabolic panel   Result Value Ref Range    Glucose 80 74 - 99 mg/dL    Sodium 138 136 - 145 mmol/L    Potassium 3.3 (L) 3.5 - 5.3 mmol/L    Chloride 102 98 - 107 mmol/L    Bicarbonate 29 21 - 32 mmol/L    Anion Gap 10 10 - 20 mmol/L    Urea Nitrogen 12 6 - 23 mg/dL    Creatinine 0.58 0.50 - 1.05 mg/dL    eGFR >90 >60 mL/min/1.73m*2    Calcium 8.9 8.6 - 10.3 mg/dL    Albumin 2.4 (L) 3.4 - 5.0 g/dL    Alkaline Phosphatase 78 33 - 136 U/L    Total Protein 4.5 (L) 6.4 - 8.2 g/dL    AST 6 (L) 9 - 39 U/L    Bilirubin, Total 0.7 0.0 - 1.2 mg/dL    ALT 7 7 - 45 U/L   Troponin I, High Sensitivity   Result Value Ref Range    Troponin I, High Sensitivity 80 (HH) 0 - 13 ng/L   B-type natriuretic peptide   Result Value Ref Range     (H) 0 - 99 pg/mL   CBC   Result Value Ref Range    WBC 4.6 4.4 - 11.3 x10*3/uL    nRBC 0.4 (H) 0.0 - 0.0 /100 WBCs    RBC 1.82 (L) 4.00 - 5.20 x10*6/uL    Hemoglobin 5.9 (LL) 12.0 - 16.0 g/dL    Hematocrit 18.8 (L) 36.0 - 46.0 %     (H) 80 - 100 fL    MCH 32.4 26.0 - 34.0 pg    MCHC 31.4 (L) 32.0 - 36.0 g/dL    RDW 18.6 (H) 11.5 - 14.5 %    Platelets 73 (L) 150 - 450 x10*3/uL   Basic metabolic panel   Result Value Ref Range    Glucose 88 74 - 99 mg/dL    Sodium 138 136 - 145 mmol/L    Potassium 2.8 (LL) 3.5 - 5.3 mmol/L    Chloride 103 98 - 107 mmol/L    Bicarbonate 29 21 - 32 mmol/L    Anion Gap 9 (L) 10 - 20 mmol/L    Urea Nitrogen 10 6 - 23 mg/dL    Creatinine 0.56 0.50 - 1.05 mg/dL    eGFR >90 >60 mL/min/1.73m*2    Calcium 9.2 8.6 - 10.3 mg/dL   Lactate   Result Value Ref Range    Lactate 1.1 0.4 - 2.0 mmol/L   Troponin I, High Sensitivity   Result Value Ref Range    Troponin I,  High Sensitivity 154 (HH) 0 - 13 ng/L   Type and Screen   Result Value Ref Range    ABO TYPE A     Rh TYPE POS     ANTIBODY SCREEN NEG    VERIFY ABO/Rh Group Test   Result Value Ref Range    ABO TYPE A     Rh TYPE POS    CBC   Result Value Ref Range    WBC 6.3 4.4 - 11.3 x10*3/uL    nRBC 0.0 0.0 - 0.0 /100 WBCs    RBC 2.78 (L) 4.00 - 5.20 x10*6/uL    Hemoglobin 8.5 (L) 12.0 - 16.0 g/dL    Hematocrit 27.0 (L) 36.0 - 46.0 %    MCV 97 80 - 100 fL    MCH 30.6 26.0 - 34.0 pg    MCHC 31.5 (L) 32.0 - 36.0 g/dL    RDW 23.0 (H) 11.5 - 14.5 %    Platelets 79 (L) 150 - 450 x10*3/uL   Prepare RBC: 1 Units   Result Value Ref Range    PRODUCT CODE L4352S38     Unit Number [id number]     Unit ABO A     Unit RH POS     XM INTEP COMP     Dispense Status TR     Blood Expiration Date May 08, 2024 23:59 EDT     PRODUCT BLOOD TYPE 6200     UNIT VOLUME 500    Prepare RBC   Result Value Ref Range    PRODUCT CODE C3884K67     Unit Number [id number]     Unit ABO A     Unit RH NEG     XM INTEP COMP     Dispense Status XM     Blood Expiration Date May 05, 2024 23:59 EDT     PRODUCT BLOOD TYPE 0600     UNIT VOLUME 500    Morphology   Result Value Ref Range    RBC Morphology See Below     Polychromasia Mild     Ovalocytes Few     Giant Platelets Few        RADIOLOGY:  Interpreted by Radiologist.  CT chest abdomen pelvis w IV contrast   Final Result   There is a large necrotic cavitary mass in the right posterior thorax   measuring 5.1 x 6.6 x 8.3 cm. There is osseous destruction and   pathologic fracture of the posterior 5th and 6th ribs. A 1.8 x 2.7 cm   spiculated nodule is noted in the left lung apex. Findings likely   relate to patient's provided history of carcinoma. Correlate with   prior workup.        Advanced centrilobular and paraseptal emphysema noted. Moderate right   pleural effusion with areas of loculation. Adjacent airspace opacity   favored to relate to compressive atelectasis. Superimposed infection   not excluded.  Correlate clinically.        There is a large heterogeneous hypoattenuating subcarinal mass   measuring 4.3 x 4.2 cm with significant mass-effect on the esophagus   with loss of fat planes with the esophagus. This is concerning for   necrotic lymph node with local invasion not excluded.        There is a 2 cm heterogeneous hypodense nodule in the left adrenal   gland concerning for metastases.        There is a 5.5 x 6.9 x 5.2 cm soft tissue mass in the left scapula   with osseous destruction of the scapular body, incompletely imaged.   There is diffuse scattered sclerotic lesions throughout the axial and   appendicular skeletal system consistent with osseous metastases.        Additional findings as described above.             MACRO:   None        Signed by: Clau Mcdonald 4/13/2024 11:10 PM   Dictation workstation:   [id number]      CT head wo IV contrast   Final Result   No acute intracranial abnormality.        Chronic changes as noted above.        MACRO:   None        Signed by: Clau Mcdonald 4/13/2024 10:46 PM   Dictation workstation:   [id number]          No results found for this or any previous visit (from the past 4464 hour(s)).  ------------------------- NURSING NOTES AND VITALS REVIEWED ---------------------------   The nursing notes within the ED encounter and vital signs as below have been reviewed.   @VS  Oxygen Saturation Interpretation: Normal      ------------------------------ ED COURSE/MEDICAL DECISION MAKING----------------------  Clinical course:  Patient has remained stable over the course of the day.  Hemoglobin is responded well to transfusion.  I have discussed this case with the transfer center.  She is still waiting for bed open at [facility].  I discussed this with the son who is aware of her insurance limitations states she has to go there.    Medical Decision Making:   Patient is pending bed placement.  I will order additional electrolytes and CBC for review and further treatment  as needed.    Counseling:   The emergency provider has spoken with the patient and family member patient and son and discussed today’s results, in addition to providing specific details for the plan of care and counseling regarding the diagnosis and prognosis.  Questions are answered at this time and they are agreeable with the plan.      --------------------------------- IMPRESSION AND DISPOSITION ---------------------------------    IMPRESSION  1. Syncope, near    2. Primary malignant neoplasm of lung metastatic to other site, unspecified laterality (Multi)    3. Hypoxemia        DISPOSITION  Disposition: Transfer to [facility] to Monitored bed  Patient condition is fair       Tello Carey MD  04/15/24 0797

## 2024-04-16 ENCOUNTER — APPOINTMENT (OUTPATIENT)
Dept: CARDIOLOGY | Facility: HOSPITAL | Age: 69
DRG: 180 | End: 2024-04-16
Payer: MEDICARE

## 2024-04-16 PROBLEM — J15.9 COMMUNITY ACQUIRED BACTERIAL PNEUMONIA: Status: ACTIVE | Noted: 2024-04-16

## 2024-04-16 PROBLEM — R52 ACUTE PAIN: Status: ACTIVE | Noted: 2024-04-16

## 2024-04-16 PROBLEM — D64.9 ACUTE ON CHRONIC ANEMIA: Status: ACTIVE | Noted: 2024-04-16

## 2024-04-16 PROBLEM — R55 SYNCOPE, NEAR: Status: ACTIVE | Noted: 2024-04-16

## 2024-04-16 PROBLEM — I10 HTN (HYPERTENSION): Status: ACTIVE | Noted: 2024-04-16

## 2024-04-16 PROBLEM — E78.2 MIXED HYPERLIPIDEMIA: Status: ACTIVE | Noted: 2024-04-16

## 2024-04-16 PROBLEM — E46 MALNUTRITION (MULTI): Status: ACTIVE | Noted: 2024-04-16

## 2024-04-16 PROBLEM — J44.9 COPD (CHRONIC OBSTRUCTIVE PULMONARY DISEASE) (MULTI): Status: ACTIVE | Noted: 2024-04-16

## 2024-04-16 PROBLEM — C79.51 MALIGNANT NEOPLASM METASTATIC TO BONE (MULTI): Status: ACTIVE | Noted: 2024-04-16

## 2024-04-16 PROBLEM — K27.9 PUD (PEPTIC ULCER DISEASE): Status: ACTIVE | Noted: 2024-04-16

## 2024-04-16 PROBLEM — D69.6 THROMBOCYTOPENIA (CMS-HCC): Status: ACTIVE | Noted: 2024-04-16

## 2024-04-16 PROBLEM — C34.90 SMALL CELL LUNG CANCER (MULTI): Status: ACTIVE | Noted: 2024-04-16

## 2024-04-16 LAB
ANION GAP SERPL CALC-SCNC: 12 MMOL/L (ref 10–20)
ATRIAL RATE: 112 BPM
ATRIAL RATE: 137 BPM
BASOPHILS # BLD AUTO: 0.02 X10*3/UL (ref 0–0.1)
BASOPHILS NFR BLD AUTO: 0.3 %
BUN SERPL-MCNC: 9 MG/DL (ref 6–23)
CALCIUM SERPL-MCNC: 9.5 MG/DL (ref 8.6–10.3)
CARDIAC TROPONIN I PNL SERPL HS: 75 NG/L (ref 0–13)
CHLORIDE SERPL-SCNC: 102 MMOL/L (ref 98–107)
CO2 SERPL-SCNC: 30 MMOL/L (ref 21–32)
CREAT SERPL-MCNC: 0.66 MG/DL (ref 0.5–1.05)
EGFRCR SERPLBLD CKD-EPI 2021: >90 ML/MIN/1.73M*2
EOSINOPHIL # BLD AUTO: 0.22 X10*3/UL (ref 0–0.7)
EOSINOPHIL NFR BLD AUTO: 3.6 %
ERYTHROCYTE [DISTWIDTH] IN BLOOD BY AUTOMATED COUNT: 23.5 % (ref 11.5–14.5)
GLUCOSE SERPL-MCNC: 92 MG/DL (ref 74–99)
HCT VFR BLD AUTO: 26.9 % (ref 36–46)
HGB BLD-MCNC: 8.5 G/DL (ref 12–16)
IMM GRANULOCYTES # BLD AUTO: 0.17 X10*3/UL (ref 0–0.7)
IMM GRANULOCYTES NFR BLD AUTO: 2.8 % (ref 0–0.9)
LYMPHOCYTES # BLD AUTO: 0.84 X10*3/UL (ref 1.2–4.8)
LYMPHOCYTES NFR BLD AUTO: 13.9 %
MAGNESIUM SERPL-MCNC: 1.51 MG/DL (ref 1.6–2.4)
MCH RBC QN AUTO: 30.1 PG (ref 26–34)
MCHC RBC AUTO-ENTMCNC: 31.6 G/DL (ref 32–36)
MCV RBC AUTO: 95 FL (ref 80–100)
MONOCYTES # BLD AUTO: 0.66 X10*3/UL (ref 0.1–1)
MONOCYTES NFR BLD AUTO: 10.9 %
NEUTROPHILS # BLD AUTO: 4.14 X10*3/UL (ref 1.2–7.7)
NEUTROPHILS NFR BLD AUTO: 68.5 %
NRBC BLD-RTO: 0 /100 WBCS (ref 0–0)
OVALOCYTES BLD QL SMEAR: NORMAL
P AXIS: 102 DEGREES
P AXIS: 73 DEGREES
P OFFSET: 195 MS
P OFFSET: 212 MS
P ONSET: 153 MS
P ONSET: 160 MS
PLATELET # BLD AUTO: 84 X10*3/UL (ref 150–450)
POLYCHROMASIA BLD QL SMEAR: NORMAL
POTASSIUM SERPL-SCNC: 3 MMOL/L (ref 3.5–5.3)
PR INTERVAL: 126 MS
PR INTERVAL: 146 MS
Q ONSET: 223 MS
Q ONSET: 226 MS
QRS COUNT: 19 BEATS
QRS COUNT: 23 BEATS
QRS DURATION: 66 MS
QRS DURATION: 74 MS
QT INTERVAL: 282 MS
QT INTERVAL: 300 MS
QTC CALCULATION(BAZETT): 409 MS
QTC CALCULATION(BAZETT): 425 MS
QTC FREDERICIA: 369 MS
QTC FREDERICIA: 371 MS
R AXIS: 33 DEGREES
R AXIS: 40 DEGREES
RBC # BLD AUTO: 2.82 X10*6/UL (ref 4–5.2)
RBC MORPH BLD: NORMAL
SCHISTOCYTES BLD QL SMEAR: NORMAL
SODIUM SERPL-SCNC: 141 MMOL/L (ref 136–145)
T AXIS: 58 DEGREES
T AXIS: 89 DEGREES
T OFFSET: 367 MS
T OFFSET: 373 MS
VENTRICULAR RATE: 112 BPM
VENTRICULAR RATE: 137 BPM
WBC # BLD AUTO: 6.1 X10*3/UL (ref 4.4–11.3)

## 2024-04-16 PROCEDURE — 2500000004 HC RX 250 GENERAL PHARMACY W/ HCPCS (ALT 636 FOR OP/ED): Mod: IPSPLIT | Performed by: NURSE PRACTITIONER

## 2024-04-16 PROCEDURE — 2500000001 HC RX 250 WO HCPCS SELF ADMINISTERED DRUGS (ALT 637 FOR MEDICARE OP): Performed by: EMERGENCY MEDICINE

## 2024-04-16 PROCEDURE — 2500000001 HC RX 250 WO HCPCS SELF ADMINISTERED DRUGS (ALT 637 FOR MEDICARE OP): Performed by: STUDENT IN AN ORGANIZED HEALTH CARE EDUCATION/TRAINING PROGRAM

## 2024-04-16 PROCEDURE — 85025 COMPLETE CBC W/AUTO DIFF WBC: CPT | Performed by: STUDENT IN AN ORGANIZED HEALTH CARE EDUCATION/TRAINING PROGRAM

## 2024-04-16 PROCEDURE — 82607 VITAMIN B-12: CPT | Mod: GENLAB

## 2024-04-16 PROCEDURE — 2500000005 HC RX 250 GENERAL PHARMACY W/O HCPCS: Mod: IPSPLIT | Performed by: NURSE PRACTITIONER

## 2024-04-16 PROCEDURE — 93005 ELECTROCARDIOGRAM TRACING: CPT | Mod: IPSPLIT

## 2024-04-16 PROCEDURE — 80048 BASIC METABOLIC PNL TOTAL CA: CPT | Performed by: EMERGENCY MEDICINE

## 2024-04-16 PROCEDURE — 2020000001 HC ICU ROOM DAILY: Mod: IPSPLIT

## 2024-04-16 PROCEDURE — 2500000006 HC RX 250 W HCPCS SELF ADMINISTERED DRUGS (ALT 637 FOR ALL PAYERS): Mod: IPSPLIT,MUE | Performed by: NURSE PRACTITIONER

## 2024-04-16 PROCEDURE — 2500000001 HC RX 250 WO HCPCS SELF ADMINISTERED DRUGS (ALT 637 FOR MEDICARE OP): Mod: IPSPLIT | Performed by: NURSE PRACTITIONER

## 2024-04-16 PROCEDURE — 36415 COLL VENOUS BLD VENIPUNCTURE: CPT | Performed by: EMERGENCY MEDICINE

## 2024-04-16 PROCEDURE — 83735 ASSAY OF MAGNESIUM: CPT | Performed by: NURSE PRACTITIONER

## 2024-04-16 PROCEDURE — C9113 INJ PANTOPRAZOLE SODIUM, VIA: HCPCS | Mod: IPSPLIT | Performed by: NURSE PRACTITIONER

## 2024-04-16 PROCEDURE — 2500000004 HC RX 250 GENERAL PHARMACY W/ HCPCS (ALT 636 FOR OP/ED): Performed by: EMERGENCY MEDICINE

## 2024-04-16 PROCEDURE — 82746 ASSAY OF FOLIC ACID SERUM: CPT | Mod: GENLAB

## 2024-04-16 PROCEDURE — 84484 ASSAY OF TROPONIN QUANT: CPT | Performed by: EMERGENCY MEDICINE

## 2024-04-16 PROCEDURE — 99223 1ST HOSP IP/OBS HIGH 75: CPT | Performed by: NURSE PRACTITIONER

## 2024-04-16 RX ORDER — ONDANSETRON 4 MG/1
4 TABLET, FILM COATED ORAL EVERY 8 HOURS PRN
Status: DISCONTINUED | OUTPATIENT
Start: 2024-04-16 | End: 2024-04-25 | Stop reason: HOSPADM

## 2024-04-16 RX ORDER — ACETAMINOPHEN 325 MG/1
650 TABLET ORAL EVERY 4 HOURS PRN
Status: DISCONTINUED | OUTPATIENT
Start: 2024-04-16 | End: 2024-04-25 | Stop reason: HOSPADM

## 2024-04-16 RX ORDER — ONDANSETRON HYDROCHLORIDE 2 MG/ML
4 INJECTION, SOLUTION INTRAVENOUS EVERY 6 HOURS PRN
Status: DISCONTINUED | OUTPATIENT
Start: 2024-04-16 | End: 2024-04-25 | Stop reason: HOSPADM

## 2024-04-16 RX ORDER — METOPROLOL TARTRATE 1 MG/ML
5 INJECTION, SOLUTION INTRAVENOUS ONCE
Status: COMPLETED | OUTPATIENT
Start: 2024-04-16 | End: 2024-04-16

## 2024-04-16 RX ORDER — GUAIFENESIN/DEXTROMETHORPHAN 100-10MG/5
5 SYRUP ORAL EVERY 4 HOURS PRN
Status: DISCONTINUED | OUTPATIENT
Start: 2024-04-16 | End: 2024-04-25 | Stop reason: HOSPADM

## 2024-04-16 RX ORDER — PANTOPRAZOLE SODIUM 40 MG/1
40 TABLET, DELAYED RELEASE ORAL
Status: DISCONTINUED | OUTPATIENT
Start: 2024-04-17 | End: 2024-04-25 | Stop reason: HOSPADM

## 2024-04-16 RX ORDER — HYDROMORPHONE HYDROCHLORIDE 2 MG/1
2 TABLET ORAL ONCE
Status: COMPLETED | OUTPATIENT
Start: 2024-04-16 | End: 2024-04-16

## 2024-04-16 RX ORDER — SODIUM CHLORIDE 9 MG/ML
10 INJECTION, SOLUTION INTRAVENOUS CONTINUOUS PRN
Status: DISCONTINUED | OUTPATIENT
Start: 2024-04-16 | End: 2024-04-25 | Stop reason: HOSPADM

## 2024-04-16 RX ORDER — GUAIFENESIN 600 MG/1
600 TABLET, EXTENDED RELEASE ORAL EVERY 12 HOURS PRN
Status: DISCONTINUED | OUTPATIENT
Start: 2024-04-16 | End: 2024-04-25 | Stop reason: HOSPADM

## 2024-04-16 RX ORDER — POTASSIUM CHLORIDE 20 MEQ/1
40 TABLET, EXTENDED RELEASE ORAL DAILY
Status: DISCONTINUED | OUTPATIENT
Start: 2024-04-16 | End: 2024-04-23

## 2024-04-16 RX ORDER — PANTOPRAZOLE SODIUM 40 MG/10ML
40 INJECTION, POWDER, LYOPHILIZED, FOR SOLUTION INTRAVENOUS DAILY
Status: DISCONTINUED | OUTPATIENT
Start: 2024-04-16 | End: 2024-04-17

## 2024-04-16 RX ORDER — POLYETHYLENE GLYCOL 3350 17 G/17G
17 POWDER, FOR SOLUTION ORAL DAILY
Status: DISCONTINUED | OUTPATIENT
Start: 2024-04-16 | End: 2024-04-25 | Stop reason: HOSPADM

## 2024-04-16 RX ORDER — SODIUM CHLORIDE 9 MG/ML
100 INJECTION, SOLUTION INTRAVENOUS CONTINUOUS
Status: DISCONTINUED | OUTPATIENT
Start: 2024-04-16 | End: 2024-04-19

## 2024-04-16 RX ORDER — ONDANSETRON HYDROCHLORIDE 2 MG/ML
4 INJECTION, SOLUTION INTRAVENOUS EVERY 8 HOURS PRN
Status: DISCONTINUED | OUTPATIENT
Start: 2024-04-16 | End: 2024-04-16 | Stop reason: SDUPTHER

## 2024-04-16 RX ORDER — PANTOPRAZOLE SODIUM 40 MG/10ML
40 INJECTION, POWDER, LYOPHILIZED, FOR SOLUTION INTRAVENOUS
Status: DISCONTINUED | OUTPATIENT
Start: 2024-04-17 | End: 2024-04-17

## 2024-04-16 RX ORDER — ENOXAPARIN SODIUM 100 MG/ML
40 INJECTION SUBCUTANEOUS EVERY 24 HOURS
Status: DISCONTINUED | OUTPATIENT
Start: 2024-04-16 | End: 2024-04-20

## 2024-04-16 RX ORDER — AMOXICILLIN 250 MG
2 CAPSULE ORAL 2 TIMES DAILY
Status: DISCONTINUED | OUTPATIENT
Start: 2024-04-16 | End: 2024-04-25 | Stop reason: HOSPADM

## 2024-04-16 RX ORDER — MAGNESIUM SULFATE HEPTAHYDRATE 40 MG/ML
2 INJECTION, SOLUTION INTRAVENOUS ONCE
Status: COMPLETED | OUTPATIENT
Start: 2024-04-16 | End: 2024-04-16

## 2024-04-16 RX ORDER — METOPROLOL TARTRATE 1 MG/ML
5 INJECTION, SOLUTION INTRAVENOUS EVERY 6 HOURS PRN
Status: DISCONTINUED | OUTPATIENT
Start: 2024-04-16 | End: 2024-04-25 | Stop reason: HOSPADM

## 2024-04-16 RX ADMIN — HYDROMORPHONE HYDROCHLORIDE 2 MG: 2 TABLET ORAL at 12:28

## 2024-04-16 RX ADMIN — POTASSIUM CHLORIDE 40 MEQ: 1500 TABLET, EXTENDED RELEASE ORAL at 14:12

## 2024-04-16 RX ADMIN — SODIUM CHLORIDE 125 ML/HR: 9 INJECTION, SOLUTION INTRAVENOUS at 13:41

## 2024-04-16 RX ADMIN — METOPROLOL TARTRATE 5 MG: 5 INJECTION INTRAVENOUS at 14:20

## 2024-04-16 RX ADMIN — GABAPENTIN 300 MG: 300 CAPSULE ORAL at 09:05

## 2024-04-16 RX ADMIN — POLYETHYLENE GLYCOL 3350 17 G: 17 POWDER, FOR SOLUTION ORAL at 15:06

## 2024-04-16 RX ADMIN — SENNOSIDES AND DOCUSATE SODIUM 2 TABLET: 8.6; 5 TABLET ORAL at 21:56

## 2024-04-16 RX ADMIN — PIPERACILLIN SODIUM AND TAZOBACTAM SODIUM 3.38 G: 3; .375 INJECTION, SOLUTION INTRAVENOUS at 14:22

## 2024-04-16 RX ADMIN — MAGNESIUM SULFATE HEPTAHYDRATE 2 G: 40 INJECTION, SOLUTION INTRAVENOUS at 15:06

## 2024-04-16 RX ADMIN — GABAPENTIN 300 MG: 300 CAPSULE ORAL at 21:55

## 2024-04-16 RX ADMIN — SODIUM CHLORIDE 125 ML/HR: 9 INJECTION, SOLUTION INTRAVENOUS at 21:56

## 2024-04-16 RX ADMIN — PIPERACILLIN SODIUM AND TAZOBACTAM SODIUM 3.38 G: 3; .375 INJECTION, SOLUTION INTRAVENOUS at 08:53

## 2024-04-16 RX ADMIN — PIPERACILLIN SODIUM AND TAZOBACTAM SODIUM 3.38 G: 3; .375 INJECTION, SOLUTION INTRAVENOUS at 21:55

## 2024-04-16 RX ADMIN — HYDROMORPHONE HYDROCHLORIDE 2 MG: 2 TABLET ORAL at 22:57

## 2024-04-16 RX ADMIN — PANTOPRAZOLE SODIUM 40 MG: 40 INJECTION, POWDER, LYOPHILIZED, FOR SOLUTION INTRAVENOUS at 15:52

## 2024-04-16 RX ADMIN — HYDROMORPHONE HYDROCHLORIDE 2 MG: 2 TABLET ORAL at 16:56

## 2024-04-16 RX ADMIN — HYDROMORPHONE HYDROCHLORIDE 16 MG: 16 TABLET, EXTENDED RELEASE ORAL at 09:00

## 2024-04-16 RX ADMIN — PIPERACILLIN SODIUM AND TAZOBACTAM SODIUM 3.38 G: 3; .375 INJECTION, SOLUTION INTRAVENOUS at 03:51

## 2024-04-16 RX ADMIN — HYDROMORPHONE HYDROCHLORIDE 2 MG: 2 TABLET ORAL at 00:59

## 2024-04-16 SDOH — ECONOMIC STABILITY: INCOME INSECURITY: IN THE PAST 12 MONTHS, HAS THE ELECTRIC, GAS, OIL, OR WATER COMPANY THREATENED TO SHUT OFF SERVICE IN YOUR HOME?: NO

## 2024-04-16 SDOH — ECONOMIC STABILITY: INCOME INSECURITY: IN THE LAST 12 MONTHS, WAS THERE A TIME WHEN YOU WERE NOT ABLE TO PAY THE MORTGAGE OR RENT ON TIME?: NO

## 2024-04-16 SDOH — ECONOMIC STABILITY: FOOD INSECURITY: WITHIN THE PAST 12 MONTHS, YOU WORRIED THAT YOUR FOOD WOULD RUN OUT BEFORE YOU GOT MONEY TO BUY MORE.: NEVER TRUE

## 2024-04-16 SDOH — ECONOMIC STABILITY: FOOD INSECURITY: WITHIN THE PAST 12 MONTHS, THE FOOD YOU BOUGHT JUST DIDN'T LAST AND YOU DIDN'T HAVE MONEY TO GET MORE.: NEVER TRUE

## 2024-04-16 SDOH — SOCIAL STABILITY: SOCIAL INSECURITY: DOES ANYONE TRY TO KEEP YOU FROM HAVING/CONTACTING OTHER FRIENDS OR DOING THINGS OUTSIDE YOUR HOME?: NO

## 2024-04-16 SDOH — ECONOMIC STABILITY: HOUSING INSECURITY
IN THE LAST 12 MONTHS, WAS THERE A TIME WHEN YOU DID NOT HAVE A STEADY PLACE TO SLEEP OR SLEPT IN A SHELTER (INCLUDING NOW)?: NO

## 2024-04-16 SDOH — SOCIAL STABILITY: SOCIAL INSECURITY: HAVE YOU HAD THOUGHTS OF HARMING ANYONE ELSE?: NO

## 2024-04-16 SDOH — SOCIAL STABILITY: SOCIAL INSECURITY: HAS ANYONE EVER THREATENED TO HURT YOUR FAMILY OR YOUR PETS?: NO

## 2024-04-16 SDOH — SOCIAL STABILITY: SOCIAL INSECURITY: ARE THERE ANY APPARENT SIGNS OF INJURIES/BEHAVIORS THAT COULD BE RELATED TO ABUSE/NEGLECT?: NO

## 2024-04-16 SDOH — SOCIAL STABILITY: SOCIAL INSECURITY: ABUSE: ADULT

## 2024-04-16 SDOH — ECONOMIC STABILITY: INCOME INSECURITY: HOW HARD IS IT FOR YOU TO PAY FOR THE VERY BASICS LIKE FOOD, HOUSING, MEDICAL CARE, AND HEATING?: NOT HARD AT ALL

## 2024-04-16 SDOH — SOCIAL STABILITY: SOCIAL INSECURITY: WERE YOU ABLE TO COMPLETE ALL THE BEHAVIORAL HEALTH SCREENINGS?: YES

## 2024-04-16 SDOH — ECONOMIC STABILITY: TRANSPORTATION INSECURITY
IN THE PAST 12 MONTHS, HAS LACK OF TRANSPORTATION KEPT YOU FROM MEETINGS, WORK, OR FROM GETTING THINGS NEEDED FOR DAILY LIVING?: NO

## 2024-04-16 SDOH — SOCIAL STABILITY: SOCIAL INSECURITY: DO YOU FEEL UNSAFE GOING BACK TO THE PLACE WHERE YOU ARE LIVING?: NO

## 2024-04-16 SDOH — ECONOMIC STABILITY: HOUSING INSECURITY: IN THE LAST 12 MONTHS, HOW MANY PLACES HAVE YOU LIVED?: 1

## 2024-04-16 SDOH — SOCIAL STABILITY: SOCIAL INSECURITY: ARE YOU OR HAVE YOU BEEN THREATENED OR ABUSED PHYSICALLY, EMOTIONALLY, OR SEXUALLY BY ANYONE?: NO

## 2024-04-16 SDOH — ECONOMIC STABILITY: TRANSPORTATION INSECURITY
IN THE PAST 12 MONTHS, HAS THE LACK OF TRANSPORTATION KEPT YOU FROM MEDICAL APPOINTMENTS OR FROM GETTING MEDICATIONS?: NO

## 2024-04-16 SDOH — SOCIAL STABILITY: SOCIAL INSECURITY: HAVE YOU HAD ANY THOUGHTS OF HARMING ANYONE ELSE?: NO

## 2024-04-16 SDOH — SOCIAL STABILITY: SOCIAL INSECURITY: DO YOU FEEL ANYONE HAS EXPLOITED OR TAKEN ADVANTAGE OF YOU FINANCIALLY OR OF YOUR PERSONAL PROPERTY?: NO

## 2024-04-16 ASSESSMENT — ACTIVITIES OF DAILY LIVING (ADL)
PATIENT'S MEMORY ADEQUATE TO SAFELY COMPLETE DAILY ACTIVITIES?: YES
TOILETING: NEEDS ASSISTANCE
HEARING - RIGHT EAR: FUNCTIONAL
ADEQUATE_TO_COMPLETE_ADL: YES
DRESSING YOURSELF: NEEDS ASSISTANCE
WALKS IN HOME: DEPENDENT
BATHING: NEEDS ASSISTANCE
HEARING - LEFT EAR: FUNCTIONAL
GROOMING: NEEDS ASSISTANCE
FEEDING YOURSELF: NEEDS ASSISTANCE
JUDGMENT_ADEQUATE_SAFELY_COMPLETE_DAILY_ACTIVITIES: YES
ASSISTIVE_DEVICE: WALKER;DENTURES UPPER;DENTURES LOWER

## 2024-04-16 ASSESSMENT — COGNITIVE AND FUNCTIONAL STATUS - GENERAL
DRESSING REGULAR UPPER BODY CLOTHING: A LOT
PATIENT BASELINE BEDBOUND: NO
STANDING UP FROM CHAIR USING ARMS: A LOT
DRESSING REGULAR LOWER BODY CLOTHING: TOTAL
MOVING TO AND FROM BED TO CHAIR: A LOT
MOBILITY SCORE: 11
PERSONAL GROOMING: A LOT
TURNING FROM BACK TO SIDE WHILE IN FLAT BAD: A LOT
DAILY ACTIVITIY SCORE: 10
HELP NEEDED FOR BATHING: TOTAL
EATING MEALS: A LITTLE
WALKING IN HOSPITAL ROOM: TOTAL
MOVING FROM LYING ON BACK TO SITTING ON SIDE OF FLAT BED WITH BEDRAILS: A LITTLE
CLIMB 3 TO 5 STEPS WITH RAILING: TOTAL
TOILETING: TOTAL

## 2024-04-16 ASSESSMENT — ENCOUNTER SYMPTOMS
WHEEZING: 0
SHORTNESS OF BREATH: 1
APPETITE CHANGE: 1
ENDOCRINE NEGATIVE: 1
CONSTIPATION: 1
FATIGUE: 1
PSYCHIATRIC NEGATIVE: 1
HEADACHES: 0
PALPITATIONS: 1
MYALGIAS: 1
ANAL BLEEDING: 0
ACTIVITY CHANGE: 1
WEAKNESS: 1
NAUSEA: 1
ABDOMINAL PAIN: 0
UNEXPECTED WEIGHT CHANGE: 1
ALLERGIC/IMMUNOLOGIC NEGATIVE: 1
EYES NEGATIVE: 1
FACIAL ASYMMETRY: 0
BRUISES/BLEEDS EASILY: 1

## 2024-04-16 ASSESSMENT — PAIN - FUNCTIONAL ASSESSMENT
PAIN_FUNCTIONAL_ASSESSMENT: 0-10

## 2024-04-16 ASSESSMENT — PAIN SCALES - GENERAL
PAINLEVEL_OUTOF10: 0 - NO PAIN
PAINLEVEL_OUTOF10: 7
PAINLEVEL_OUTOF10: 7
PAINLEVEL_OUTOF10: 0 - NO PAIN
PAINLEVEL_OUTOF10: 0 - NO PAIN
PAINLEVEL_OUTOF10: 2
PAINLEVEL_OUTOF10: 2

## 2024-04-16 ASSESSMENT — LIFESTYLE VARIABLES
HOW OFTEN DO YOU HAVE 6 OR MORE DRINKS ON ONE OCCASION: NEVER
PRESCIPTION_ABUSE_PAST_12_MONTHS: NO
HOW MANY STANDARD DRINKS CONTAINING ALCOHOL DO YOU HAVE ON A TYPICAL DAY: PATIENT DOES NOT DRINK
SUBSTANCE_ABUSE_PAST_12_MONTHS: NO
SKIP TO QUESTIONS 9-10: 1
HOW OFTEN DO YOU HAVE A DRINK CONTAINING ALCOHOL: NEVER
AUDIT-C TOTAL SCORE: 0
AUDIT-C TOTAL SCORE: 0

## 2024-04-16 ASSESSMENT — PATIENT HEALTH QUESTIONNAIRE - PHQ9
2. FEELING DOWN, DEPRESSED OR HOPELESS: NOT AT ALL
1. LITTLE INTEREST OR PLEASURE IN DOING THINGS: NOT AT ALL
SUM OF ALL RESPONSES TO PHQ9 QUESTIONS 1 & 2: 0

## 2024-04-16 ASSESSMENT — COLUMBIA-SUICIDE SEVERITY RATING SCALE - C-SSRS
2. HAVE YOU ACTUALLY HAD ANY THOUGHTS OF KILLING YOURSELF?: NO
6. HAVE YOU EVER DONE ANYTHING, STARTED TO DO ANYTHING, OR PREPARED TO DO ANYTHING TO END YOUR LIFE?: NO
1. IN THE PAST MONTH, HAVE YOU WISHED YOU WERE DEAD OR WISHED YOU COULD GO TO SLEEP AND NOT WAKE UP?: NO

## 2024-04-16 ASSESSMENT — PAIN DESCRIPTION - ORIENTATION: ORIENTATION: LOWER

## 2024-04-16 ASSESSMENT — PAIN DESCRIPTION - LOCATION: LOCATION: BACK

## 2024-04-16 NOTE — CARE PLAN
The patient's goals for the shift include pain relief    The clinical goals for the shift include heart rate will be < 120 this shift    Patient converted to nsr after potassium,magnesium and iv lopressor given. V/s stable. Mediated  for pain. No s/s of bleeding noted.  at bedside. Will continue to monitor

## 2024-04-16 NOTE — H&P
History Of Present Illness  Malathi Rubio is a 68 y.o. female with PMH of metastatic small cell lung cancer, HTN, malnutrition, COPD, upper GIB, PUD, anemia, gastritis, ex-smoker, TCP, HLD who presented to ED 4/13 with syncope while sitting on the toilet at home. She states she has not been eating or drinking much lately and has been feeling very weak. She recently also started on dilaudid at home for severe pain from bone mets and she stated this weakness started around the same time. On arrival to ED her workup showedK 3.0, albumin 2.9, lactate 5.3, troponin 86, H/H 8.1/26. Repeat H/H after IVF showed Hgb of 5.9 with platelets 73. Troponins trended up to 154 before trending down. She was given a PRBC transfusion in ED and her potassium was corrected. CT chest showed a large necrotic cavitary mass in the right posterior thorax measuring 5.1 x 6.6 x 8.3 cm with osseous destruction and pathologic fracture of the posterior 5th and 6th ribs, emphysema, right pleural effusion with areas of loculation, airspace opacity as well. She and her  originally requested transfer to [place] to continue care she was receiving there, but after 3 days waiting for a bed she decided to stay here for continued treatment for anemia, syncope and pneumonia.      Past Medical History  Past Medical History:   Diagnosis Date    Anemia     Cancer (Multi)     small cell lung cancer    COPD (chronic obstructive pulmonary disease) (Multi)     Emphysema of lung (Multi)     Hypertension      Surgical History  Past Surgical History:   Procedure Laterality Date    APPENDECTOMY  01/29/2016    Appendectomy    LUNG LOBECTOMY  01/29/2016    Lung Lobectomy    OTHER SURGICAL HISTORY  11/15/2013    Thoracoscopy (Therapeutic)      Social History  She reports that she has been smoking cigarettes. She has never used smokeless tobacco. She reports that she does not drink alcohol and does not use drugs.    Family History  No family history on file.      Allergies  Patient has no known allergies.    Review of Systems   Constitutional:  Positive for activity change, appetite change, fatigue and unexpected weight change.   HENT: Negative.     Eyes: Negative.    Respiratory:  Positive for shortness of breath. Negative for wheezing.    Cardiovascular:  Positive for palpitations and leg swelling. Negative for chest pain.   Gastrointestinal:  Positive for constipation and nausea. Negative for abdominal pain and anal bleeding.   Endocrine: Negative.    Genitourinary: Negative.    Musculoskeletal:  Positive for myalgias.   Skin: Negative.    Allergic/Immunologic: Negative.    Neurological:  Positive for syncope and weakness. Negative for facial asymmetry and headaches.   Hematological:  Bruises/bleeds easily.   Psychiatric/Behavioral: Negative.          Physical Exam  Constitutional:       General: She is not in acute distress.     Appearance: She is ill-appearing and toxic-appearing.   HENT:      Head: Normocephalic and atraumatic.      Mouth/Throat:      Mouth: Mucous membranes are dry.   Eyes:      Extraocular Movements: Extraocular movements intact.      Pupils: Pupils are equal, round, and reactive to light.   Cardiovascular:      Rate and Rhythm: Tachycardia present. Rhythm irregular.      Pulses: Normal pulses.      Heart sounds: Normal heart sounds. No murmur heard.     No gallop.   Pulmonary:      Effort: Pulmonary effort is normal. No respiratory distress.      Breath sounds: No wheezing, rhonchi or rales.      Comments: Diminished, poor effort    Abdominal:      General: Bowel sounds are normal. There is no distension.      Palpations: Abdomen is soft.      Tenderness: There is no abdominal tenderness. There is no guarding or rebound.   Musculoskeletal:         General: No tenderness, deformity or signs of injury. Normal range of motion.      Cervical back: Normal range of motion and neck supple.      Right lower leg: Edema present.      Left lower leg: Edema  "present.   Skin:     General: Skin is warm and dry.      Capillary Refill: Capillary refill takes less than 2 seconds.      Coloration: Skin is pale. Skin is not jaundiced.      Findings: Bruising present. No rash.   Neurological:      General: No focal deficit present.      Mental Status: She is alert and oriented to person, place, and time.      Cranial Nerves: No cranial nerve deficit.      Sensory: No sensory deficit.      Motor: No weakness.      Gait: Gait normal.   Psychiatric:         Behavior: Behavior normal.      Comments: Flat affect       Last Recorded Vitals  Blood pressure 121/83, pulse (!) 130, temperature 37.3 °C (99.1 °F), temperature source Temporal, resp. rate 14, height 1.651 m (5' 5\"), weight 55.8 kg (123 lb 0.3 oz), SpO2 98%.    Scheduled medications  gabapentin, 300 mg, oral, Nightly  HYDROmorphone, 16 mg, oral, q24h CHEIKH  magnesium sulfate, 2 g, intravenous, Once  oxygen, , inhalation, Continuous - Inhalation  piperacillin-tazobactam, 3.375 g, intravenous, q6h  polyethylene glycol, 17 g, oral, Daily  potassium chloride CR, 40 mEq, oral, Daily    Continuous medications  sodium chloride 0.9%, 10 mL/hr  sodium chloride 0.9%, 125 mL/hr, Last Rate: 125 mL/hr (04/16/24 1341)    PRN medications  PRN medications: gabapentin, HYDROmorphone, metoprolol, sodium chloride 0.9%    Relevant Results  ECG 12 lead  Result Date: 4/16/2024  Sinus tachycardia with occasional Premature ventricular complexes Nonspecific ST and T wave abnormality     ECG 12 lead  Result Date: 4/16/2024  Sinus tachycardia     CT chest abdomen pelvis w IV contrast  Result Date: 4/13/2024  There is a large necrotic cavitary mass in the right posterior thorax measuring 5.1 x 6.6 x 8.3 cm. There is osseous destruction and pathologic fracture of the posterior 5th and 6th ribs. A 1.8 x 2.7 cm spiculated nodule is noted in the left lung apex. Findings likely relate to patient's provided history of carcinoma. Correlate with prior workup.   " Advanced centrilobular and paraseptal emphysema noted. Moderate right pleural effusion with areas of loculation. Adjacent airspace opacity favored to relate to compressive atelectasis. Superimposed infection not excluded. Correlate clinically.   There is a large heterogeneous hypoattenuating subcarinal mass measuring 4.3 x 4.2 cm with significant mass-effect on the esophagus with loss of fat planes with the esophagus. This is concerning for necrotic lymph node with local invasion not excluded.   There is a 2 cm heterogeneous hypodense nodule in the left adrenal gland concerning for metastases.   There is a 5.5 x 6.9 x 5.2 cm soft tissue mass in the left scapula with osseous destruction of the scapular body, incompletely imaged. There is diffuse scattered sclerotic lesions throughout the axial and appendicular skeletal system consistent with osseous metastases.   Additional findings as described above.     MACRO: None   Signed by: Clua Mcdonald 4/13/2024 11:10 PM Dictation workstation:   [id number]    CT head wo IV contrast  Result Date: 4/13/2024  No acute intracranial abnormality.   Chronic changes as noted above.   MACRO: None   Signed by: Clau Mcdonald 4/13/2024 10:46 PM Dictation workstation:   [id number]     Latest Reference Range & Units 04/15/24 02:01 04/15/24 15:42 04/16/24 08:59   GLUCOSE 74 - 99 mg/dL 88 96 92   SODIUM 136 - 145 mmol/L 138 138 141   POTASSIUM 3.5 - 5.3 mmol/L 2.8 (LL) 3.1 (L) 3.0 (L)   CHLORIDE 98 - 107 mmol/L 103 102 102   Bicarbonate 21 - 32 mmol/L 29 26 30   Anion Gap 10 - 20 mmol/L 9 (L) 13 12   Blood Urea Nitrogen 6 - 23 mg/dL 10 9 9   Creatinine 0.50 - 1.05 mg/dL 0.56 0.63 0.66   EGFR >60 mL/min/1.73m*2 >90 >90 >90   Calcium 8.6 - 10.3 mg/dL 9.2 9.4 9.5      Latest Reference Range & Units 04/15/24 02:01 04/16/24 11:18   Troponin I, High Sensitivity 0 - 13 ng/L 154 (HH) 75 (HH)      Latest Reference Range & Units 04/15/24 02:01 04/15/24 10:00 04/15/24 16:31 04/16/24 08:59    LEUKOCYTES (10*3/UL) IN BLOOD BY AUTOMATED COUNT, [place] 4.4 - 11.3 x10*3/uL 4.6 6.3 6.7 6.1   nRBC 0.0 - 0.0 /100 WBCs 0.4 (H) 0.0 0.3 (H) 0.0   ERYTHROCYTES (10*6/UL) IN BLOOD BY AUTOMATED COUNT, [place] 4.00 - 5.20 x10*6/uL 1.82 (L) 2.78 (L) 2.90 (L) 2.82 (L)   HEMOGLOBIN 12.0 - 16.0 g/dL 5.9 (LL) 8.5 (L) 8.9 (L) 8.5 (L)   HEMATOCRIT 36.0 - 46.0 % 18.8 (L) 27.0 (L) 26.4 (L) 26.9 (L)   MCV 80 - 100 fL 103 (H) 97 91 95   MCH 26.0 - 34.0 pg 32.4 30.6 30.7 30.1   MCHC 32.0 - 36.0 g/dL 31.4 (L) 31.5 (L) 33.7 31.6 (L)   RED CELL DISTRIBUTION WIDTH 11.5 - 14.5 % 18.6 (H) 23.0 (H) 23.2 (H) 23.5 (H)   PLATELETS (10*3/UL) IN BLOOD AUTOMATED COUNT, [place] 150 - 450 x10*3/uL 73 (L) 79 (L) 71 (L) 84 (L)     Assessment/Plan   Principal Problem:    Syncope  Active Problems:    Acute pain    Small cell lung cancer (Multi)    Malignant neoplasm metastatic to bone (Multi)    COPD (chronic obstructive pulmonary disease) (Multi)    Acute on chronic anemia    Community acquired bacterial pneumonia    Malnutrition (Multi)    PUD (peptic ulcer disease)    Thrombocytopenia (CMS-HCC)    Mixed hyperlipidemia    Syncope  Acute on chronic anemia  Thrombocytopenia (CMS-HCC)  - syncope at home on toilet  - EKG ST  - CTH shows no acute process  - H/H 5.9/18.8, transfused in ED  - repeat H/H 8.5/27 > 8.5/26.9  - platelets 73 > 84  - no active bleeding  - monitor CBC daily  - transfuse as needed  - telemetry    Acute pain  Small cell lung cancer (Multi)  Malignant neoplasm metastatic to bone (Multi)  COPD (chronic obstructive pulmonary disease) (Multi)  Community acquired bacterial pneumonia  - known small cell lung cancer with mets to bone, brain, liver and adrenal glands  - original lung cancer with RUL lobectomy in 2011  - sees Hem[facility]Onc at [facility]  - chemotherapy within the past month  - could not have last treatment 2 weeks ago 2/2 TCP, anemia  - CT chest: There is a large necrotic cavitary mass in the right posterior thorax measuring 5.1  x 6.6 x 8.3 cm. There is osseous destruction and pathologic fracture of the posterior 5th and 6th ribs. A 1.8 x 2.7 cm spiculated nodule is noted in the left lung apex. Findings likely relate to patient's provided history of carcinoma. Correlate with prior workup.   Advanced centrilobular and paraseptal emphysema noted. Moderate right pleural effusion with areas of loculation. Adjacent airspace opacity favored to relate to compressive atelectasis. Superimposed infection not excluded. Correlate clinically.   There is a large heterogeneous hypoattenuating subcarinal mass measuring 4.3 x 4.2 cm with significant mass-effect on the esophagus with loss of fat planes with the esophagus. This is concerning for necrotic lymph node with local invasion not excluded.   There is a 2 cm heterogeneous hypodense nodule in the left adrenal gland concerning for metastases.   There is a 5.5 x 6.9 x 5.2 cm soft tissue mass in the left scapula with osseous destruction of the scapular body, incompletely imaged. There is diffuse scattered sclerotic lesions throughout the axial and appendicular skeletal system consistent with osseous metastases.    - started on IV pip/nic, continue (day 3)  - duonebs Q6 hours  - RT consult, on NC supplemental  - Keep sats >92%  - GOC discussion attempted with pt's , he is very weepy and overwhelmed today    Malnutrition  PUD (peptic ulcer disease)  - recent weight loss, has not eaten more than 400 calories per day since Easter (per )  - low albumin, total protein  - nutrition consult, appreciate recs  - PPI    GI ppx: PPI  DVT ppx: n/a, bleeding risk, SCDs  Fluids: maintenance  Electrolytes: replace as needed  Nutrition: regular diet, nutrition consult  Adjuncts: PIV    Code Status: full code, GOC discussion presented  Pt requires inpatient stay at this time.  Total accumulated time spent face to face and not face to face preparing to see the patient, obtaining and reviewing separately  obtained history; performing a medically appropriate examination and/or evaluation; counseling and educating the patient, family; ordering medications, tests, or procedures; referring and communicating with other health care professionals; documenting clinical information in the patient's medical record; independently interpreting results and communicating the results to the patient, family; and care coordination was 45 minutes.    LUIS Krause-CNP

## 2024-04-16 NOTE — PROGRESS NOTES
Emergency Medicine Transition of Care Note.    I received Malathi Rubio in signout from Dr. TAYLER Culver.  Please see the previous ED provider note for all HPI, PE and MDM up to the time of signout at 2300. This is in addition to the primary record.    In brief Malathi Rubio is an 68 y.o. female presenting for   Chief Complaint   Patient presents with    Syncope     At the time of signout we were awaiting: Transfer to :[facility]    68-year-old who presented with syncope.  Patient has history of malignant neoplasm of the lung with metastatic disease.  Blood work revealed patient to be anemic and blood transfusions were ordered.  Patient and family request transfer to [place].  Patient was excepted there and awaiting a bed.    Results for orders placed or performed during the hospital encounter of 04/13/24   Blood Culture    Specimen: Peripheral Venipuncture; Blood culture   Result Value Ref Range    Blood Culture Loaded on Instrument - Culture in progress    Blood Culture    Specimen: Peripheral Venipuncture; Blood culture   Result Value Ref Range    Blood Culture Loaded on Instrument - Culture in progress    CBC and Auto Differential   Result Value Ref Range    WBC 6.7 4.4 - 11.3 x10*3/uL    nRBC 0.6 (H) 0.0 - 0.0 /100 WBCs    RBC 2.50 (L) 4.00 - 5.20 x10*6/uL    Hemoglobin 8.1 (L) 12.0 - 16.0 g/dL    Hematocrit 26.0 (L) 36.0 - 46.0 %     (H) 80 - 100 fL    MCH 32.4 26.0 - 34.0 pg    MCHC 31.2 (L) 32.0 - 36.0 g/dL    RDW 18.5 (H) 11.5 - 14.5 %    Platelets 93 (L) 150 - 450 x10*3/uL    Neutrophils % 69.6 40.0 - 80.0 %    Immature Granulocytes %, Automated 3.6 (H) 0.0 - 0.9 %    Lymphocytes % 12.0 13.0 - 44.0 %    Monocytes % 12.9 2.0 - 10.0 %    Eosinophils % 1.4 0.0 - 6.0 %    Basophils % 0.5 0.0 - 2.0 %    Neutrophils Absolute 4.64 1.20 - 7.70 x10*3/uL    Immature Granulocytes Absolute, Automated 0.24 0.00 - 0.70 x10*3/uL    Lymphocytes Absolute 0.80 (L) 1.20 - 4.80 x10*3/uL    Monocytes  Absolute 0.86 0.10 - 1.00 x10*3/uL    Eosinophils Absolute 0.09 0.00 - 0.70 x10*3/uL    Basophils Absolute 0.03 0.00 - 0.10 x10*3/uL   Comprehensive metabolic panel   Result Value Ref Range    Glucose 148 (H) 74 - 99 mg/dL    Sodium 135 (L) 136 - 145 mmol/L    Potassium 3.0 (L) 3.5 - 5.3 mmol/L    Chloride 96 (L) 98 - 107 mmol/L    Bicarbonate 24 21 - 32 mmol/L    Anion Gap 18 10 - 20 mmol/L    Urea Nitrogen 15 6 - 23 mg/dL    Creatinine 0.78 0.50 - 1.05 mg/dL    eGFR 83 >60 mL/min/1.73m*2    Calcium 10.0 8.6 - 10.3 mg/dL    Albumin 2.9 (L) 3.4 - 5.0 g/dL    Alkaline Phosphatase 105 33 - 136 U/L    Total Protein 5.8 (L) 6.4 - 8.2 g/dL    AST 13 9 - 39 U/L    Bilirubin, Total 1.0 0.0 - 1.2 mg/dL    ALT 9 7 - 45 U/L   Magnesium   Result Value Ref Range    Magnesium 1.91 1.60 - 2.40 mg/dL   BLOOD GAS VENOUS FULL PANEL   Result Value Ref Range    POCT pH, Venous 7.45 (H) 7.33 - 7.43 pH    POCT pCO2, Venous 39 (L) 41 - 51 mm Hg    POCT pO2, Venous 58 (H) 35 - 45 mm Hg    POCT SO2, Venous 90 (H) 45 - 75 %    POCT Oxy Hemoglobin, Venous 80.2 (H) 45.0 - 75.0 %    POCT Hematocrit Calculated, Venous 26.0 (L) 36.0 - 46.0 %    POCT Sodium, Venous 133 (L) 136 - 145 mmol/L    POCT Potassium, Venous 3.9 3.5 - 5.3 mmol/L    POCT Chloride, Venous 97 (L) 98 - 107 mmol/L    POCT Ionized Calicum, Venous 1.26 1.10 - 1.33 mmol/L    POCT Glucose, Venous 148 (H) 74 - 99 mg/dL    POCT Lactate, Venous 6.4 (HH) 0.4 - 2.0 mmol/L    POCT Base Excess, Venous 2.9 -2.0 - 3.0 mmol/L    POCT HCO3 Calculated, Venous 27.1 (H) 22.0 - 26.0 mmol/L    POCT Hemoglobin, Venous 8.8 (L) 12.0 - 16.0 g/dL    POCT Anion Gap, Venous 13.0 10.0 - 25.0 mmol/L    Patient Temperature      FiO2 32 %   Lactate   Result Value Ref Range    Lactate 5.3 (HH) 0.4 - 2.0 mmol/L   Light Blue Top   Result Value Ref Range    Extra Tube Hold for add-ons.    Lavender Top   Result Value Ref Range    Extra Tube Hold for add-ons.    Lactate   Result Value Ref Range    Lactate 1.4 0.4  - 2.0 mmol/L   Urinalysis with Reflex Microscopic   Result Value Ref Range    Color, Urine Yellow Straw, Yellow    Appearance, Urine Clear Clear    Specific Gravity, Urine <1.005 (A) 1.005 - 1.035    pH, Urine 6.5 5.0, 5.5, 6.0, 6.5, 7.0, 7.5, 8.0    Protein, Urine 10 (TRACE) NEGATIVE, 10 (TRACE) mg/dL    Glucose, Urine NEGATIVE NEGATIVE mg/dL    Blood, Urine NEGATIVE NEGATIVE    Ketones, Urine NEGATIVE NEGATIVE mg/dL    Bilirubin, Urine NEGATIVE NEGATIVE    Urobilinogen, Urine NORM NORM mg/dL    Nitrite, Urine NEGATIVE NEGATIVE    Leukocyte Esterase, Urine NEGATIVE NEGATIVE   Sars-CoV-2 and Influenza A/B PCR   Result Value Ref Range    Flu A Result Not Detected Not Detected    Flu B Result Not Detected Not Detected    Coronavirus 2019, PCR Not Detected Not Detected   Troponin I, High Sensitivity   Result Value Ref Range    Troponin I, High Sensitivity 86 (HH) 0 - 13 ng/L   Troponin I, High Sensitivity   Result Value Ref Range    Troponin I, High Sensitivity 98 (HH) 0 - 13 ng/L   Troponin I, High Sensitivity   Result Value Ref Range    Troponin I, High Sensitivity 92 (HH) 0 - 13 ng/L   Microscopic Only, Urine   Result Value Ref Range    WBC, Urine 1-5 1-5, NONE /HPF    RBC, Urine 1-2 NONE, 1-2, 3-5 /HPF    Mucus, Urine FEW Reference range not established. /LPF   Comprehensive metabolic panel   Result Value Ref Range    Glucose 80 74 - 99 mg/dL    Sodium 138 136 - 145 mmol/L    Potassium 3.3 (L) 3.5 - 5.3 mmol/L    Chloride 102 98 - 107 mmol/L    Bicarbonate 29 21 - 32 mmol/L    Anion Gap 10 10 - 20 mmol/L    Urea Nitrogen 12 6 - 23 mg/dL    Creatinine 0.58 0.50 - 1.05 mg/dL    eGFR >90 >60 mL/min/1.73m*2    Calcium 8.9 8.6 - 10.3 mg/dL    Albumin 2.4 (L) 3.4 - 5.0 g/dL    Alkaline Phosphatase 78 33 - 136 U/L    Total Protein 4.5 (L) 6.4 - 8.2 g/dL    AST 6 (L) 9 - 39 U/L    Bilirubin, Total 0.7 0.0 - 1.2 mg/dL    ALT 7 7 - 45 U/L   Troponin I, High Sensitivity   Result Value Ref Range    Troponin I, High Sensitivity  80 (HH) 0 - 13 ng/L   B-type natriuretic peptide   Result Value Ref Range     (H) 0 - 99 pg/mL   CBC   Result Value Ref Range    WBC 4.6 4.4 - 11.3 x10*3/uL    nRBC 0.4 (H) 0.0 - 0.0 /100 WBCs    RBC 1.82 (L) 4.00 - 5.20 x10*6/uL    Hemoglobin 5.9 (LL) 12.0 - 16.0 g/dL    Hematocrit 18.8 (L) 36.0 - 46.0 %     (H) 80 - 100 fL    MCH 32.4 26.0 - 34.0 pg    MCHC 31.4 (L) 32.0 - 36.0 g/dL    RDW 18.6 (H) 11.5 - 14.5 %    Platelets 73 (L) 150 - 450 x10*3/uL   Basic metabolic panel   Result Value Ref Range    Glucose 88 74 - 99 mg/dL    Sodium 138 136 - 145 mmol/L    Potassium 2.8 (LL) 3.5 - 5.3 mmol/L    Chloride 103 98 - 107 mmol/L    Bicarbonate 29 21 - 32 mmol/L    Anion Gap 9 (L) 10 - 20 mmol/L    Urea Nitrogen 10 6 - 23 mg/dL    Creatinine 0.56 0.50 - 1.05 mg/dL    eGFR >90 >60 mL/min/1.73m*2    Calcium 9.2 8.6 - 10.3 mg/dL   Lactate   Result Value Ref Range    Lactate 1.1 0.4 - 2.0 mmol/L   Troponin I, High Sensitivity   Result Value Ref Range    Troponin I, High Sensitivity 154 (HH) 0 - 13 ng/L   Type and Screen   Result Value Ref Range    ABO TYPE A     Rh TYPE POS     ANTIBODY SCREEN NEG    VERIFY ABO/Rh Group Test   Result Value Ref Range    ABO TYPE A     Rh TYPE POS    CBC   Result Value Ref Range    WBC 6.3 4.4 - 11.3 x10*3/uL    nRBC 0.0 0.0 - 0.0 /100 WBCs    RBC 2.78 (L) 4.00 - 5.20 x10*6/uL    Hemoglobin 8.5 (L) 12.0 - 16.0 g/dL    Hematocrit 27.0 (L) 36.0 - 46.0 %    MCV 97 80 - 100 fL    MCH 30.6 26.0 - 34.0 pg    MCHC 31.5 (L) 32.0 - 36.0 g/dL    RDW 23.0 (H) 11.5 - 14.5 %    Platelets 79 (L) 150 - 450 x10*3/uL   CBC and Auto Differential   Result Value Ref Range    WBC 6.7 4.4 - 11.3 x10*3/uL    nRBC 0.3 (H) 0.0 - 0.0 /100 WBCs    RBC 2.90 (L) 4.00 - 5.20 x10*6/uL    Hemoglobin 8.9 (L) 12.0 - 16.0 g/dL    Hematocrit 26.4 (L) 36.0 - 46.0 %    MCV 91 80 - 100 fL    MCH 30.7 26.0 - 34.0 pg    MCHC 33.7 32.0 - 36.0 g/dL    RDW 23.2 (H) 11.5 - 14.5 %    Platelets 71 (L) 150 - 450 x10*3/uL     Neutrophils % 69.6 40.0 - 80.0 %    Immature Granulocytes %, Automated 4.3 (H) 0.0 - 0.9 %    Lymphocytes % 10.0 13.0 - 44.0 %    Monocytes % 12.4 2.0 - 10.0 %    Eosinophils % 3.3 0.0 - 6.0 %    Basophils % 0.4 0.0 - 2.0 %    Neutrophils Absolute 4.64 1.20 - 7.70 x10*3/uL    Immature Granulocytes Absolute, Automated 0.29 0.00 - 0.70 x10*3/uL    Lymphocytes Absolute 0.67 (L) 1.20 - 4.80 x10*3/uL    Monocytes Absolute 0.83 0.10 - 1.00 x10*3/uL    Eosinophils Absolute 0.22 0.00 - 0.70 x10*3/uL    Basophils Absolute 0.03 0.00 - 0.10 x10*3/uL   Basic metabolic panel   Result Value Ref Range    Glucose 96 74 - 99 mg/dL    Sodium 138 136 - 145 mmol/L    Potassium 3.1 (L) 3.5 - 5.3 mmol/L    Chloride 102 98 - 107 mmol/L    Bicarbonate 26 21 - 32 mmol/L    Anion Gap 13 10 - 20 mmol/L    Urea Nitrogen 9 6 - 23 mg/dL    Creatinine 0.63 0.50 - 1.05 mg/dL    eGFR >90 >60 mL/min/1.73m*2    Calcium 9.4 8.6 - 10.3 mg/dL   Prepare RBC: 1 Units   Result Value Ref Range    PRODUCT CODE T0326P15     Unit Number [id number]     Unit ABO A     Unit RH POS     XM INTEP COMP     Dispense Status TR     Blood Expiration Date May 08, 2024 23:59 EDT     PRODUCT BLOOD TYPE 6200     UNIT VOLUME 500    Prepare RBC   Result Value Ref Range    PRODUCT CODE Y7499X49     Unit Number [id number]     Unit ABO A     Unit RH NEG     XM INTEP COMP     Dispense Status RE     Blood Expiration Date May 05, 2024 23:59 EDT     PRODUCT BLOOD TYPE 0600     UNIT VOLUME 500    Morphology   Result Value Ref Range    RBC Morphology See Below     Polychromasia Mild     Ovalocytes Few     Giant Platelets Few    Morphology   Result Value Ref Range    RBC Morphology See Below     Polychromasia Mild     Ovalocytes Few     Clumped Platelets Present           Diagnoses as of 04/16/24 0440   Syncope, near   Primary malignant neoplasm of lung metastatic to other site, unspecified laterality (Multi)   Hypoxemia       Medical Decision Making  Patient is resting  comfortably at this time.  Will continue to monitor patient closely.  Patient be signed out to the oncoming physician Dr. SAMEERA Coy on shift change at 0700 unless bed becomes available and patient gets transferred.  Please see previous  provider notes.        Final diagnoses:   [R55] Syncope, near   [C34.90] Primary malignant neoplasm of lung metastatic to other site, unspecified laterality (Multi)   [R09.02] Hypoxemia           Procedure  Procedures    Quang Ledesma, DO

## 2024-04-16 NOTE — PROGRESS NOTES
Emergency Medicine Transition of Care Note.    I received Malathi Rubio in signout from Dr. QUIROS.  Please see the previous ED provider note for all HPI, PE and MDM up to the time of signout at 0700. This is in addition to the primary record.    In brief Malathi Rubio is an 68 y.o. female presenting for   Chief Complaint   Patient presents with    Syncope     At the time of signout we were awaiting: a bed at [facility]    Diagnoses as of 04/16/24 1217   Syncope, near   Primary malignant neoplasm of lung metastatic to other site, unspecified laterality (Multi)   Hypoxemia   Pneumonia due to infectious organism, unspecified laterality, unspecified part of lung   Anemia, unspecified type       Medical Decision Making  68-year-old female slated to be transferred to [place] for treatment of her lung cancer and anemia.  Patient also appears to have pneumonia.  Unfortunately could not clinic still does not have a bed patient been here for multiple days.  At this point the plan is to admit her to the intensive care unit here at [place] awaiting for a bed at [facility].  Include clinic in [place] asked that they will not have a bed for this patient today.  Patient will continue getting care but I do believe the patient will have maximized care and patient again still waiting for a bed at [facility].        Final diagnoses:   [R55] Syncope, near   [C34.90] Primary malignant neoplasm of lung metastatic to other site, unspecified laterality (Multi)   [R09.02] Hypoxemia   [J18.9] Pneumonia due to infectious organism, unspecified laterality, unspecified part of lung   [D64.9] Anemia, unspecified type           Procedure  Procedures    Lawrence Coy, DO

## 2024-04-16 NOTE — NURSING NOTE
Patient admitted to icu bed 4 via bed from er. Placed on monitor  4-16-94 [id number]. Patient converted to nsr.Ryan NP informed

## 2024-04-16 NOTE — PROGRESS NOTES
04/16/24 1431   Discharge Planning   Living Arrangements Spouse/significant other   Support Systems Spouse/significant other   Assistance Needed Assisted with ADLs/IADLS- [facility] - [facility], PT  (DME used:  wheelchair, rolator, walker, BSC, lift chair (sleeping))   Type of Residence Private residence  (Mobile Home)   Number of Stairs to Enter Residence 4   Number of Stairs Within Residence 0   Do you have animals or pets at home? Yes   Type of Animals or Pets cat   Home or Post Acute Services In home services   Type of Home Care Services Home PT;Home nursing visits   Patient expects to be discharged to: [facility]   Does the patient need discharge transport arranged? No  (spouse)     TCC spoke with patient and her  Andrew regarding discharge planning and home going needs. Patient lives  with her  in a mobile home. She sleeps in her lift chair and has a BSC nearby.    assists with ADLs/IADLS. Her 3 children live out of state.  Patient has become weaker lately but she has been independent up to about 3 weeks ago.  She does not use home oxygen.  Confirmed with patient PCP is  Opal Marques in [facility]. Patient is active with [facility], PT.    Discharge Plan is for patient to return home with [facility] at this time.  TCC will continue to follow for changes in discharge planning needs.

## 2024-04-16 NOTE — DISCHARGE INSTR - OTHER ORDERS
Thank you for choosing [facility] for your Health Care needs.  Also, thank you for allowing us to take you and your families preferences into account when determining your discharge plan.  Stay well!  [employer] is a free community service that provides information about social health and government resources 24 hours a day.  It provides Human services agencies, food and shelter providers,  resources, special services for senior and volunteer opportunities.  www.211[place].net       For questions about medications listed on your discharge instructions, please call the Nurses Station at [phone].

## 2024-04-17 LAB
ANION GAP SERPL CALC-SCNC: 11 MMOL/L (ref 10–20)
APPEARANCE UR: CLEAR
BILIRUB UR STRIP.AUTO-MCNC: NEGATIVE MG/DL
BUN SERPL-MCNC: 8 MG/DL (ref 6–23)
CALCIUM SERPL-MCNC: 8.9 MG/DL (ref 8.6–10.3)
CHLORIDE SERPL-SCNC: 104 MMOL/L (ref 98–107)
CO2 SERPL-SCNC: 28 MMOL/L (ref 21–32)
COLOR UR: ABNORMAL
CREAT SERPL-MCNC: 0.72 MG/DL (ref 0.5–1.05)
EGFRCR SERPLBLD CKD-EPI 2021: >90 ML/MIN/1.73M*2
ERYTHROCYTE [DISTWIDTH] IN BLOOD BY AUTOMATED COUNT: 19.9 % (ref 11.5–14.5)
ERYTHROCYTE [DISTWIDTH] IN BLOOD BY AUTOMATED COUNT: 22.8 % (ref 11.5–14.5)
FERRITIN SERPL-MCNC: 1541 NG/ML (ref 8–150)
FLUAV RNA RESP QL NAA+PROBE: NOT DETECTED
FLUBV RNA RESP QL NAA+PROBE: NOT DETECTED
FOLATE SERPL-MCNC: 12.9 NG/ML
GLUCOSE SERPL-MCNC: 99 MG/DL (ref 74–99)
GLUCOSE UR STRIP.AUTO-MCNC: NORMAL MG/DL
HCT VFR BLD AUTO: 22.9 % (ref 36–46)
HCT VFR BLD AUTO: 33.2 % (ref 36–46)
HGB BLD-MCNC: 10.8 G/DL (ref 12–16)
HGB BLD-MCNC: 7.3 G/DL (ref 12–16)
HOLD SPECIMEN: NORMAL
IRON SATN MFR SERPL: ABNORMAL %
IRON SERPL-MCNC: 22 UG/DL (ref 35–150)
KETONES UR STRIP.AUTO-MCNC: NEGATIVE MG/DL
LEUKOCYTE ESTERASE UR QL STRIP.AUTO: NEGATIVE
MAGNESIUM SERPL-MCNC: 1.81 MG/DL (ref 1.6–2.4)
MCH RBC QN AUTO: 30.7 PG (ref 26–34)
MCH RBC QN AUTO: 31.4 PG (ref 26–34)
MCHC RBC AUTO-ENTMCNC: 31.9 G/DL (ref 32–36)
MCHC RBC AUTO-ENTMCNC: 32.5 G/DL (ref 32–36)
MCV RBC AUTO: 96 FL (ref 80–100)
MCV RBC AUTO: 97 FL (ref 80–100)
MUCOUS THREADS #/AREA URNS AUTO: NORMAL /LPF
NITRITE UR QL STRIP.AUTO: NEGATIVE
NRBC BLD-RTO: 0 /100 WBCS (ref 0–0)
NRBC BLD-RTO: 0 /100 WBCS (ref 0–0)
PH UR STRIP.AUTO: 6 [PH]
PLATELET # BLD AUTO: 68 X10*3/UL (ref 150–450)
PLATELET # BLD AUTO: 72 X10*3/UL (ref 150–450)
POTASSIUM SERPL-SCNC: 2.8 MMOL/L (ref 3.5–5.3)
PROT UR STRIP.AUTO-MCNC: ABNORMAL MG/DL
RBC # BLD AUTO: 2.38 X10*6/UL (ref 4–5.2)
RBC # BLD AUTO: 3.44 X10*6/UL (ref 4–5.2)
RBC # UR STRIP.AUTO: ABNORMAL /UL
RBC #/AREA URNS AUTO: NORMAL /HPF
RSV RNA RESP QL NAA+PROBE: NOT DETECTED
SARS-COV-2 RNA RESP QL NAA+PROBE: NOT DETECTED
SODIUM SERPL-SCNC: 140 MMOL/L (ref 136–145)
SP GR UR STRIP.AUTO: 1.01
TIBC SERPL-MCNC: ABNORMAL UG/DL
UIBC SERPL-MCNC: <55 UG/DL (ref 110–370)
UROBILINOGEN UR STRIP.AUTO-MCNC: NORMAL MG/DL
VIT B12 SERPL-MCNC: 1232 PG/ML (ref 211–911)
WBC # BLD AUTO: 4.6 X10*3/UL (ref 4.4–11.3)
WBC # BLD AUTO: 5.6 X10*3/UL (ref 4.4–11.3)
WBC #/AREA URNS AUTO: NORMAL /HPF

## 2024-04-17 PROCEDURE — 2500000004 HC RX 250 GENERAL PHARMACY W/ HCPCS (ALT 636 FOR OP/ED): Mod: IPSPLIT

## 2024-04-17 PROCEDURE — 36415 COLL VENOUS BLD VENIPUNCTURE: CPT | Mod: IPSPLIT | Performed by: NURSE PRACTITIONER

## 2024-04-17 PROCEDURE — 9420000001 HC RT PATIENT EDUCATION 5 MIN: Mod: IPSPLIT

## 2024-04-17 PROCEDURE — 2500000001 HC RX 250 WO HCPCS SELF ADMINISTERED DRUGS (ALT 637 FOR MEDICARE OP): Mod: IPSPLIT | Performed by: NURSE PRACTITIONER

## 2024-04-17 PROCEDURE — 80048 BASIC METABOLIC PNL TOTAL CA: CPT | Mod: IPSPLIT | Performed by: NURSE PRACTITIONER

## 2024-04-17 PROCEDURE — 36415 COLL VENOUS BLD VENIPUNCTURE: CPT | Mod: IPSPLIT | Performed by: INTERNAL MEDICINE

## 2024-04-17 PROCEDURE — 85027 COMPLETE CBC AUTOMATED: CPT | Mod: IPSPLIT | Performed by: NURSE PRACTITIONER

## 2024-04-17 PROCEDURE — 36430 TRANSFUSION BLD/BLD COMPNT: CPT | Mod: IPSPLIT

## 2024-04-17 PROCEDURE — 85027 COMPLETE CBC AUTOMATED: CPT | Mod: IPSPLIT | Performed by: INTERNAL MEDICINE

## 2024-04-17 PROCEDURE — 94668 MNPJ CHEST WALL SBSQ: CPT | Mod: IPSPLIT

## 2024-04-17 PROCEDURE — P9040 RBC LEUKOREDUCED IRRADIATED: HCPCS | Mod: IPSPLIT

## 2024-04-17 PROCEDURE — 83540 ASSAY OF IRON: CPT | Mod: IPSPLIT

## 2024-04-17 PROCEDURE — 1200000002 HC GENERAL ROOM WITH TELEMETRY DAILY: Mod: IPSPLIT

## 2024-04-17 PROCEDURE — 99223 1ST HOSP IP/OBS HIGH 75: CPT | Performed by: INTERNAL MEDICINE

## 2024-04-17 PROCEDURE — 94667 MNPJ CHEST WALL 1ST: CPT | Mod: IPSPLIT

## 2024-04-17 PROCEDURE — 2500000006 HC RX 250 W HCPCS SELF ADMINISTERED DRUGS (ALT 637 FOR ALL PAYERS): Mod: IPSPLIT,MUE | Performed by: NURSE PRACTITIONER

## 2024-04-17 PROCEDURE — 84132 ASSAY OF SERUM POTASSIUM: CPT | Mod: IPSPLIT | Performed by: INTERNAL MEDICINE

## 2024-04-17 PROCEDURE — 81003 URINALYSIS AUTO W/O SCOPE: CPT | Mod: IPSPLIT

## 2024-04-17 PROCEDURE — 87637 SARSCOV2&INF A&B&RSV AMP PRB: CPT | Mod: IPSPLIT

## 2024-04-17 PROCEDURE — 84484 ASSAY OF TROPONIN QUANT: CPT | Mod: IPSPLIT

## 2024-04-17 PROCEDURE — C9113 INJ PANTOPRAZOLE SODIUM, VIA: HCPCS | Mod: IPSPLIT | Performed by: NURSE PRACTITIONER

## 2024-04-17 PROCEDURE — 84145 PROCALCITONIN (PCT): CPT | Mod: GENLAB

## 2024-04-17 PROCEDURE — 2500000004 HC RX 250 GENERAL PHARMACY W/ HCPCS (ALT 636 FOR OP/ED): Mod: IPSPLIT | Performed by: NURSE PRACTITIONER

## 2024-04-17 PROCEDURE — 2500000005 HC RX 250 GENERAL PHARMACY W/O HCPCS: Mod: IPSPLIT | Performed by: NURSE PRACTITIONER

## 2024-04-17 PROCEDURE — 82728 ASSAY OF FERRITIN: CPT | Mod: IPSPLIT

## 2024-04-17 PROCEDURE — 83735 ASSAY OF MAGNESIUM: CPT | Mod: IPSPLIT

## 2024-04-17 PROCEDURE — 99233 SBSQ HOSP IP/OBS HIGH 50: CPT

## 2024-04-17 RX ORDER — AMIODARONE HYDROCHLORIDE 200 MG/1
400 TABLET ORAL DAILY
Status: DISCONTINUED | OUTPATIENT
Start: 2024-04-17 | End: 2024-04-25 | Stop reason: HOSPADM

## 2024-04-17 RX ORDER — IPRATROPIUM BROMIDE AND ALBUTEROL SULFATE 2.5; .5 MG/3ML; MG/3ML
3 SOLUTION RESPIRATORY (INHALATION)
Status: DISCONTINUED | OUTPATIENT
Start: 2024-04-17 | End: 2024-04-17

## 2024-04-17 RX ORDER — AMIODARONE HYDROCHLORIDE 200 MG/1
200 TABLET ORAL DAILY
Status: DISCONTINUED | OUTPATIENT
Start: 2024-05-01 | End: 2024-04-25 | Stop reason: HOSPADM

## 2024-04-17 RX ORDER — POTASSIUM CHLORIDE 14.9 MG/ML
INJECTION INTRAVENOUS
Status: COMPLETED
Start: 2024-04-17 | End: 2024-04-17

## 2024-04-17 RX ORDER — POTASSIUM CHLORIDE 14.9 MG/ML
20 INJECTION INTRAVENOUS
Status: COMPLETED | OUTPATIENT
Start: 2024-04-17 | End: 2024-04-17

## 2024-04-17 RX ORDER — IPRATROPIUM BROMIDE AND ALBUTEROL SULFATE 2.5; .5 MG/3ML; MG/3ML
3 SOLUTION RESPIRATORY (INHALATION) EVERY 4 HOURS PRN
Status: DISCONTINUED | OUTPATIENT
Start: 2024-04-17 | End: 2024-04-25 | Stop reason: HOSPADM

## 2024-04-17 RX ADMIN — PIPERACILLIN SODIUM AND TAZOBACTAM SODIUM 3.38 G: 3; .375 INJECTION, SOLUTION INTRAVENOUS at 21:06

## 2024-04-17 RX ADMIN — GUAIFENESIN 600 MG: 600 TABLET, EXTENDED RELEASE ORAL at 09:26

## 2024-04-17 RX ADMIN — PIPERACILLIN SODIUM AND TAZOBACTAM SODIUM 3.38 G: 3; .375 INJECTION, SOLUTION INTRAVENOUS at 14:10

## 2024-04-17 RX ADMIN — POTASSIUM CHLORIDE 20 MEQ: 14.9 INJECTION, SOLUTION INTRAVENOUS at 09:34

## 2024-04-17 RX ADMIN — SENNOSIDES AND DOCUSATE SODIUM 2 TABLET: 8.6; 5 TABLET ORAL at 20:56

## 2024-04-17 RX ADMIN — PANTOPRAZOLE SODIUM 40 MG: 40 INJECTION, POWDER, LYOPHILIZED, FOR SOLUTION INTRAVENOUS at 06:39

## 2024-04-17 RX ADMIN — PANTOPRAZOLE SODIUM 40 MG: 40 TABLET, DELAYED RELEASE ORAL at 09:31

## 2024-04-17 RX ADMIN — HYDROMORPHONE HYDROCHLORIDE 2 MG: 2 TABLET ORAL at 21:57

## 2024-04-17 RX ADMIN — GABAPENTIN 300 MG: 300 CAPSULE ORAL at 14:10

## 2024-04-17 RX ADMIN — ACETAMINOPHEN 650 MG: 325 TABLET ORAL at 14:10

## 2024-04-17 RX ADMIN — Medication 2 L/MIN: at 12:13

## 2024-04-17 RX ADMIN — POTASSIUM CHLORIDE 40 MEQ: 1500 TABLET, EXTENDED RELEASE ORAL at 09:25

## 2024-04-17 RX ADMIN — POTASSIUM CHLORIDE 20 MEQ: 14.9 INJECTION, SOLUTION INTRAVENOUS at 06:39

## 2024-04-17 RX ADMIN — METHYLPREDNISOLONE SODIUM SUCCINATE 40 MG: 40 INJECTION, POWDER, FOR SOLUTION INTRAMUSCULAR; INTRAVENOUS at 20:56

## 2024-04-17 RX ADMIN — POLYETHYLENE GLYCOL 3350 17 G: 17 POWDER, FOR SOLUTION ORAL at 09:24

## 2024-04-17 RX ADMIN — SODIUM CHLORIDE 125 ML/HR: 9 INJECTION, SOLUTION INTRAVENOUS at 06:48

## 2024-04-17 RX ADMIN — AMIODARONE HYDROCHLORIDE 400 MG: 200 TABLET ORAL at 14:09

## 2024-04-17 RX ADMIN — HYDROMORPHONE HYDROCHLORIDE 2 MG: 2 TABLET ORAL at 11:05

## 2024-04-17 RX ADMIN — PIPERACILLIN SODIUM AND TAZOBACTAM SODIUM 3.38 G: 3; .375 INJECTION, SOLUTION INTRAVENOUS at 09:24

## 2024-04-17 RX ADMIN — HYDROMORPHONE HYDROCHLORIDE 16 MG: 16 TABLET, EXTENDED RELEASE ORAL at 09:28

## 2024-04-17 RX ADMIN — PIPERACILLIN SODIUM AND TAZOBACTAM SODIUM 3.38 G: 3; .375 INJECTION, SOLUTION INTRAVENOUS at 03:45

## 2024-04-17 RX ADMIN — METHYLPREDNISOLONE SODIUM SUCCINATE 40 MG: 40 INJECTION, POWDER, FOR SOLUTION INTRAMUSCULAR; INTRAVENOUS at 09:50

## 2024-04-17 RX ADMIN — GABAPENTIN 300 MG: 300 CAPSULE ORAL at 20:56

## 2024-04-17 RX ADMIN — SENNOSIDES AND DOCUSATE SODIUM 2 TABLET: 8.6; 5 TABLET ORAL at 09:25

## 2024-04-17 ASSESSMENT — ENCOUNTER SYMPTOMS
FATIGUE: 1
ACTIVITY CHANGE: 1
APPETITE CHANGE: 1
FEVER: 0
DIZZINESS: 1
WHEEZING: 0
COUGH: 0
PALPITATIONS: 0
COUGH: 1
WEAKNESS: 0
SHORTNESS OF BREATH: 1
SHORTNESS OF BREATH: 0
CHILLS: 0
ABDOMINAL PAIN: 0
STRIDOR: 0
CHOKING: 0

## 2024-04-17 ASSESSMENT — PAIN DESCRIPTION - LOCATION: LOCATION: CHEST

## 2024-04-17 ASSESSMENT — PAIN DESCRIPTION - ORIENTATION: ORIENTATION: RIGHT

## 2024-04-17 ASSESSMENT — PAIN SCALES - GENERAL
PAINLEVEL_OUTOF10: 5 - MODERATE PAIN
PAINLEVEL_OUTOF10: 4
PAINLEVEL_OUTOF10: 7
PAINLEVEL_OUTOF10: 7
PAINLEVEL_OUTOF10: 2
PAINLEVEL_OUTOF10: 7
PAINLEVEL_OUTOF10: 2
PAINLEVEL_OUTOF10: 0 - NO PAIN

## 2024-04-17 ASSESSMENT — PAIN - FUNCTIONAL ASSESSMENT
PAIN_FUNCTIONAL_ASSESSMENT: 0-10
PAIN_FUNCTIONAL_ASSESSMENT: CPOT (CRITICAL CARE PAIN OBSERVATION TOOL)
PAIN_FUNCTIONAL_ASSESSMENT: 0-10
PAIN_FUNCTIONAL_ASSESSMENT: CPOT (CRITICAL CARE PAIN OBSERVATION TOOL)
PAIN_FUNCTIONAL_ASSESSMENT: 0-10

## 2024-04-17 NOTE — CONSULTS
Inpatient consult to Cardiology  Consult performed by: SRUTHI Canchola  Consult ordered by: SRUTHI Krause  Reason for consult: syncope          History Of Present Illness  Malathi Rubio is a 68 y.o. female presenting with a syncopal episode. She states she was sitting on the toilet trying to have a bowel movement when she passed out.  She does report slight dizziness prior to loss of consciousness.  She denies any shortness of breath or chest pain. She has been to the ER multiple times for the same thing.     Lab work in the ER showed glucose 88, sodium 138, potassium 2.8, BUN/creatinine 10/0.56, troponin 154, WBCs 4.6, H&H 5.9/18.8.    She was transfused 1 units of PRBCs and admitted to the ICU.  She was then noted to go into A-fib with RVR.  She was given IV Lopressor and magnesium and potassium supplements.  She converted to sinus rhythm shortly after.      Past Medical History  Past Medical History:   Diagnosis Date    Anemia     Cancer (Multi)     small cell lung cancer    COPD (chronic obstructive pulmonary disease) (Multi)     Emphysema of lung (Multi)     Hypertension        Surgical History  Past Surgical History:   Procedure Laterality Date    APPENDECTOMY  01/29/2016    Appendectomy    LUNG LOBECTOMY  01/29/2016    Lung Lobectomy    OTHER SURGICAL HISTORY  11/15/2013    Thoracoscopy (Therapeutic)        Social History  She reports that she has been smoking cigarettes. She has never used smokeless tobacco. She reports that she does not drink alcohol and does not use drugs.    Family History  No family history on file.     Allergies  Patient has no known allergies.    Review of Systems  Review of Systems   Constitutional:  Negative for chills and fever.   Respiratory:  Negative for cough and shortness of breath.    Cardiovascular:  Negative for chest pain, palpitations and leg swelling.   Gastrointestinal:  Negative for abdominal pain.   Neurological:  Positive for dizziness and  "syncope. Negative for weakness.   All other systems reviewed and are negative.         Physical Exam  Constitutional: Well developed, awake/alert/oriented x3, no distress, alert and cooperative  Eyes: PERRL, EOMI, clear sclera  ENMT: mucous membranes moist, no apparent injury, no lesions seen  Head/Neck: Neck supple, no apparent injury, thyroid without mass or tenderness, No JVD, trachea midline, no bruits  Respiratory/Thorax: Patent airways, diminished throughout with good chest expansion, thorax symmetric  Cardiovascular: Regular, rate and rhythm, no murmurs, 2+ equal pulses of the extremities, normal S 1and S 2  Gastrointestinal: Nondistended, soft, non-tender, no rebound tenderness or guarding, no masses palpable, no organomegaly, +BS, no bruits  Musculoskeletal: ROM intact, no joint swelling, normal strength  Extremities: normal extremities, no cyanosis edema, contusions or wounds, no clubbing  Neurological: alert and oriented x3, intact senses, motor, response and reflexes, normal strength  Lymphatic: No significant lymphadenopathy  Psychological: Appropriate mood and behavior  Skin: Warm and dry, no lesions, no rashes       Last Recorded Vitals  Blood pressure 137/67, pulse 86, temperature 36 °C (96.8 °F), temperature source Temporal, resp. rate 14, height 1.651 m (5' 5\"), weight 57.4 kg (126 lb 8.7 oz), SpO2 98%.    Relevant Results    Scheduled medications  amiodarone, 400 mg, oral, Daily   Followed by  [START ON 5/1/2024] amiodarone, 200 mg, oral, Daily  [Held by provider] enoxaparin, 40 mg, subcutaneous, q24h  gabapentin, 300 mg, oral, Nightly  HYDROmorphone, 16 mg, oral, q24h CHEIKH  methylPREDNISolone sodium succinate (PF), 40 mg, intravenous, q8h  oxygen, , inhalation, Continuous - Inhalation  pantoprazole, 40 mg, oral, Daily before breakfast  piperacillin-tazobactam, 3.375 g, intravenous, q6h  polyethylene glycol, 17 g, oral, Daily  potassium chloride CR, 40 mEq, oral, Daily  sennosides-docusate sodium, " 2 tablet, oral, BID      Continuous medications  sodium chloride 0.9%, 10 mL/hr  sodium chloride 0.9%, 100 mL/hr, Last Rate: 100 mL/hr (04/17/24 1049)      PRN medications  PRN medications: acetaminophen, acetaminophen, benzocaine-menthol, dextromethorphan-guaifenesin, gabapentin, guaiFENesin, HYDROmorphone, ipratropium-albuteroL, metoprolol, ondansetron, ondansetron **OR** [DISCONTINUED] ondansetron, sodium chloride 0.9%    Results for orders placed or performed during the hospital encounter of 04/13/24 (from the past 24 hour(s))   CBC   Result Value Ref Range    WBC 5.6 4.4 - 11.3 x10*3/uL    nRBC 0.0 0.0 - 0.0 /100 WBCs    RBC 2.38 (L) 4.00 - 5.20 x10*6/uL    Hemoglobin 7.3 (L) 12.0 - 16.0 g/dL    Hematocrit 22.9 (L) 36.0 - 46.0 %    MCV 96 80 - 100 fL    MCH 30.7 26.0 - 34.0 pg    MCHC 31.9 (L) 32.0 - 36.0 g/dL    RDW 22.8 (H) 11.5 - 14.5 %    Platelets 68 (L) 150 - 450 x10*3/uL   Basic metabolic panel   Result Value Ref Range    Glucose 99 74 - 99 mg/dL    Sodium 140 136 - 145 mmol/L    Potassium 2.8 (LL) 3.5 - 5.3 mmol/L    Chloride 104 98 - 107 mmol/L    Bicarbonate 28 21 - 32 mmol/L    Anion Gap 11 10 - 20 mmol/L    Urea Nitrogen 8 6 - 23 mg/dL    Creatinine 0.72 0.50 - 1.05 mg/dL    eGFR >90 >60 mL/min/1.73m*2    Calcium 8.9 8.6 - 10.3 mg/dL   Magnesium   Result Value Ref Range    Magnesium 1.81 1.60 - 2.40 mg/dL   Iron and TIBC   Result Value Ref Range    Iron 22 (L) 35 - 150 ug/dL    UIBC <55 (L) 110 - 370 ug/dL    TIBC      % Saturation     Ferritin   Result Value Ref Range    Ferritin 1,541 (H) 8 - 150 ng/mL   Prepare RBC: 1 Units   Result Value Ref Range    PRODUCT CODE I2286J52     Unit Number [id number]     Unit ABO A     Unit RH POS     XM INTEP COMP     Dispense Status XM     Blood Expiration Date May 10, 2024 23:59 EDT     PRODUCT BLOOD TYPE 6200     UNIT VOLUME 350    Urinalysis with Reflex Culture and Microscopic   Result Value Ref Range    Color, Urine Light-Yellow Light-Yellow, Yellow,  Dark-Yellow    Appearance, Urine Clear Clear    Specific Gravity, Urine 1.010 1.005 - 1.035    pH, Urine 6.0 5.0, 5.5, 6.0, 6.5, 7.0, 7.5, 8.0    Protein, Urine 20 (TRACE) NEGATIVE, 10 (TRACE), 20 (TRACE) mg/dL    Glucose, Urine Normal Normal mg/dL    Blood, Urine 0.03 (TRACE) (A) NEGATIVE    Ketones, Urine NEGATIVE NEGATIVE mg/dL    Bilirubin, Urine NEGATIVE NEGATIVE    Urobilinogen, Urine Normal Normal mg/dL    Nitrite, Urine NEGATIVE NEGATIVE    Leukocyte Esterase, Urine NEGATIVE NEGATIVE   Urinalysis Microscopic   Result Value Ref Range    WBC, Urine 1-5 1-5, NONE /HPF    RBC, Urine NONE NONE, 1-2, 3-5 /HPF    Mucus, Urine FEW Reference range not established. /LPF   Sars-CoV-2 and Influenza A/B PCR   Result Value Ref Range    Flu A Result Not Detected Not Detected    Flu B Result Not Detected Not Detected    Coronavirus 2019, PCR Not Detected Not Detected   RSV PCR   Result Value Ref Range    RSV PCR Not Detected Not Detected       CT chest abdomen pelvis w IV contrast   Final Result   There is a large necrotic cavitary mass in the right posterior thorax   measuring 5.1 x 6.6 x 8.3 cm. There is osseous destruction and   pathologic fracture of the posterior 5th and 6th ribs. A 1.8 x 2.7 cm   spiculated nodule is noted in the left lung apex. Findings likely   relate to patient's provided history of carcinoma. Correlate with   prior workup.        Advanced centrilobular and paraseptal emphysema noted. Moderate right   pleural effusion with areas of loculation. Adjacent airspace opacity   favored to relate to compressive atelectasis. Superimposed infection   not excluded. Correlate clinically.        There is a large heterogeneous hypoattenuating subcarinal mass   measuring 4.3 x 4.2 cm with significant mass-effect on the esophagus   with loss of fat planes with the esophagus. This is concerning for   necrotic lymph node with local invasion not excluded.        There is a 2 cm heterogeneous hypodense nodule in the  left adrenal   gland concerning for metastases.        There is a 5.5 x 6.9 x 5.2 cm soft tissue mass in the left scapula   with osseous destruction of the scapular body, incompletely imaged.   There is diffuse scattered sclerotic lesions throughout the axial and   appendicular skeletal system consistent with osseous metastases.        Additional findings as described above.             MACRO:   None        Signed by: Clau Mcdonald 4/13/2024 11:10 PM   Dictation workstation:   [id number]      CT head wo IV contrast   Final Result   No acute intracranial abnormality.        Chronic changes as noted above.        MACRO:   None        Signed by: Clau Mcdonald 4/13/2024 10:46 PM   Dictation workstation:   [id number]          No echocardiogram results found for the past 12 months       Assessment/Plan   Syncope  -Most likely vasovagal and acute on chronic anemia  -Hemoglobin 5.9 on admit  -S/p PRBC transfusion  -Platelets low, 68 today  -Also with A-fib with RVR, now sinus rhythm    2.  Paroxysmal atrial fibrillation with RVR  -Converted to sinus rhythm after IV Lopressor, magnesium, potassium  -Not a candidate for anticoagulation due to thrombocytopenia and severe anemia  -Start amiodarone 400 mg daily x 2 weeks then 200 mg daily  -Monitor on telemetry    3.  Elevated troponins-non-MI elevation  -Denies ACS symptoms  -EKG without acute ischemic changes  -Not a candidate for invasive cardiac procedures    4.  COPD exacerbation  -CT reviewed  -Steroids  -Bronchodilators  -Pulmonary consulted  -Bronchial hygiene    5.  Hypokalemia, hypomagnesemia  -Supplement  -Monitor on telemetry    6.  Acute on chronic anemia, thrombocytopenia  -Transfused with PRBCs  -Check iron studies  -Monitor CBC       LUIS Canchola-CNP

## 2024-04-17 NOTE — CARE PLAN
The patient's goals for the shift include adjusting patient's code status to correspond with her wishes.    The clinical goals for the shift include transfuse one unit of PRBCs with S/S of blood transfusion reaction.

## 2024-04-17 NOTE — CARE PLAN
The patient's goals for the shift include pain relief    The clinical goals for the shift include transfuse one unit of PRBCs with S/S of blood transfusion reaction.    Patient did have pain relief after dilaudid and tolerated PRBCs well.  at bedside during the day. ANGELES Burr discussed code status with patient and .

## 2024-04-17 NOTE — CARE PLAN
The patient's goals for the shift include pain relief    The clinical goals for the shift include heart rate will be < 120 this shift    Over the shift, the patient did make progress toward the following goals. Heart rate remained SR with rate 60-90s. Pain was controlled with PRN dilaudid oral. Potassium replaced per order

## 2024-04-17 NOTE — PROGRESS NOTES
"Malathi Rubio is a 68 y.o. female on day 1 of admission presenting with Syncope.    Subjective     No overnight events reported.     Patient resting comfortably this morning on 1L O2, she does not wear home O2. She denies fevers or chills but does have a dry cough. No chest pain. Patient is currently on Zosyn, will add Solumedrol for wheezing on exam. Code status discussion completed. Patient does NOT wish to be resuscitated in the event of cardiopulmonary arrest, code status changed to DNRCCA. Discussed treatment plan with patient including IV antibiotics, fluid, and blood transfusion; patient is in agreement. Pulmonology is consulted to see the patient while she is admitted.        Objective     Physical Exam  Constitutional:       General: She is not in acute distress.     Appearance: She is ill-appearing.      Comments: Thin-appearing   HENT:      Head: Normocephalic and atraumatic.      Mouth/Throat:      Mouth: Mucous membranes are moist.   Eyes:      Conjunctiva/sclera: Conjunctivae normal.   Cardiovascular:      Rate and Rhythm: Normal rate and regular rhythm.      Heart sounds: No murmur heard.  Pulmonary:      Effort: No respiratory distress.      Breath sounds: Wheezing present. No rales.      Comments: Diminished breath sounds  Abdominal:      General: There is no distension.      Palpations: Abdomen is soft.      Tenderness: There is no abdominal tenderness.   Musculoskeletal:         General: No swelling.   Skin:     General: Skin is warm and dry.      Coloration: Skin is pale.   Neurological:      Mental Status: She is alert and oriented to person, place, and time.      Motor: Weakness (generalized) present.   Psychiatric:         Mood and Affect: Mood normal.         Behavior: Behavior normal.         Last Recorded Vitals  Blood pressure 124/60, pulse 91, temperature 36.6 °C (97.9 °F), resp. rate 11, height 1.651 m (5' 5\"), weight 57.4 kg (126 lb 8.7 oz), SpO2 96%.  Intake/Output last 3 " Shifts:  I/O last 3 completed shifts:  In: 2061 (35.9 mL/kg) [P.O.:120; I.V.:1941 (33.8 mL/kg)]  Out: 2825 (49.2 mL/kg) [Urine:2825 (1.4 mL/kg/hr)]  Weight: 57.4 kg     Relevant Results     This patient currently has cardiac telemetry ordered; if you would like to modify or discontinue the telemetry order, click here to go to the orders activity to modify/discontinue the order.    Scheduled medications  [Held by provider] enoxaparin, 40 mg, subcutaneous, q24h  gabapentin, 300 mg, oral, Nightly  HYDROmorphone, 16 mg, oral, q24h CHEIKH  methylPREDNISolone sodium succinate (PF), 40 mg, intravenous, q8h  oxygen, , inhalation, Continuous - Inhalation  pantoprazole, 40 mg, oral, Daily before breakfast  piperacillin-tazobactam, 3.375 g, intravenous, q6h  polyethylene glycol, 17 g, oral, Daily  potassium chloride, 20 mEq, intravenous, q2h  potassium chloride CR, 40 mEq, oral, Daily  sennosides-docusate sodium, 2 tablet, oral, BID      Continuous medications  sodium chloride 0.9%, 10 mL/hr  sodium chloride 0.9%, 100 mL/hr, Last Rate: 125 mL/hr (04/17/24 0648)      PRN medications  PRN medications: acetaminophen, acetaminophen, benzocaine-menthol, dextromethorphan-guaifenesin, gabapentin, guaiFENesin, HYDROmorphone, metoprolol, ondansetron, ondansetron **OR** [DISCONTINUED] ondansetron, sodium chloride 0.9%    Results for orders placed or performed during the hospital encounter of 04/13/24 (from the past 24 hour(s))   Troponin I, High Sensitivity   Result Value Ref Range    Troponin I, High Sensitivity 75 (HH) 0 - 13 ng/L   Magnesium   Result Value Ref Range    Magnesium 1.51 (L) 1.60 - 2.40 mg/dL   Electrocardiogram, 12-lead PRN ACS symptoms   Result Value Ref Range    Ventricular Rate 133 BPM    Atrial Rate 234 BPM    QRS Duration 80 ms    QT Interval 318 ms    QTC Calculation(Bazett) 473 ms    R Axis 46 degrees    T Axis 38 degrees    QRS Count 22 beats    Q Onset 221 ms    T Offset 380 ms    QTC Fredericia 414 ms   ECG 12  lead   Result Value Ref Range    Ventricular Rate 137 BPM    Atrial Rate 137 BPM    UT Interval 146 ms    QRS Duration 66 ms    QT Interval 282 ms    QTC Calculation(Bazett) 425 ms    P Axis 102 degrees    R Axis 33 degrees    T Axis 89 degrees    QRS Count 23 beats    Q Onset 226 ms    P Onset 153 ms    P Offset 195 ms    T Offset 367 ms    QTC Fredericia 371 ms   CBC   Result Value Ref Range    WBC 5.6 4.4 - 11.3 x10*3/uL    nRBC 0.0 0.0 - 0.0 /100 WBCs    RBC 2.38 (L) 4.00 - 5.20 x10*6/uL    Hemoglobin 7.3 (L) 12.0 - 16.0 g/dL    Hematocrit 22.9 (L) 36.0 - 46.0 %    MCV 96 80 - 100 fL    MCH 30.7 26.0 - 34.0 pg    MCHC 31.9 (L) 32.0 - 36.0 g/dL    RDW 22.8 (H) 11.5 - 14.5 %    Platelets 68 (L) 150 - 450 x10*3/uL   Basic metabolic panel   Result Value Ref Range    Glucose 99 74 - 99 mg/dL    Sodium 140 136 - 145 mmol/L    Potassium 2.8 (LL) 3.5 - 5.3 mmol/L    Chloride 104 98 - 107 mmol/L    Bicarbonate 28 21 - 32 mmol/L    Anion Gap 11 10 - 20 mmol/L    Urea Nitrogen 8 6 - 23 mg/dL    Creatinine 0.72 0.50 - 1.05 mg/dL    eGFR >90 >60 mL/min/1.73m*2    Calcium 8.9 8.6 - 10.3 mg/dL   Magnesium   Result Value Ref Range    Magnesium 1.81 1.60 - 2.40 mg/dL       Electrocardiogram, 12-lead PRN ACS symptoms    Result Date: 4/17/2024  Atrial fibrillation with rapid ventricular response Nonspecific ST abnormality Abnormal ECG When compared with ECG of 13-APR-2024 21:00, Atrial fibrillation has replaced Sinus rhythm Nonspecific T wave abnormality no longer evident in Inferior leads    ECG 12 lead    Result Date: 4/16/2024  Sinus tachycardia with occasional Premature ventricular complexes Nonspecific ST and T wave abnormality Abnormal ECG When compared with ECG of 10-AUG-2015 23:27, Premature ventricular complexes are now Present Vent. rate has increased BY  65 BPM ST now depressed in Anterior leads Nonspecific T wave abnormality now evident in Anterolateral leads See ED provider note for full interpretation and clinical  correlation Confirmed by Denisa Looney ([id number]) on 4/16/2024 2:34:25 PM    ECG 12 lead    Result Date: 4/16/2024  Sinus tachycardia Otherwise normal ECG When compared with ECG of 13-APR-2024 19:59, (unconfirmed) Premature ventricular complexes are no longer Present Nonspecific T wave abnormality now evident in Inferior leads Nonspecific T wave abnormality no longer evident in Lateral leads See ED provider note for full interpretation and clinical correlation Confirmed by Denisa Looney ([id number]) on 4/16/2024 2:26:29 PM    CT chest abdomen pelvis w IV contrast    Result Date: 4/13/2024  Interpreted By:  Clau Mcdonald, STUDY: CT CHEST ABDOMEN PELVIS W IV CONTRAST;  4/13/2024 10:05 pm   INDICATION: Signs/Symptoms:syncope with known small cell.   COMPARISON: CT scan of the abdomen and pelvis 08/11/2015.   ACCESSION NUMBER(S): [id number]   ORDERING CLINICIAN: YAIR FREEMAN   TECHNIQUE: Axial CT images of the chest, abdomen and pelvis with coronal and sagittal reconstructed images obtained after intravenous administration of 75 mL of Omnipaque 350.   FINDINGS: CHEST:   VESSELS: Aorta and pulmonary artery are normal caliber. Atherosclerotic changes in the aorta and arch vessels. HEART: Normal size. No pericardial effusion. Aortic root and mild coronary artery calcifications noted. MEDIASTINUM AND DARNELL: There is a large heterogeneous hypoattenuating subcarinal mass measuring 4.3 x 4.2 cm with significant mass-effect on the esophagus with loss of fat planes with the esophagus. This is concerning for necrotic lymph node with local invasion not excluded. No axillary or hilar adenopathy. Calcified mediastinal and right hilar lymph nodes noted. LUNG, PLEURA, LARGE AIRWAYS: Moderate right pleural effusion. Advanced centrilobular and paraseptal emphysema noted. Biapical pleuroparenchymal scarring. There is a large necrotic cavitary mass in the right mid thorax with osseous destruction of the 5th and 6th ribs with  pathologic fracture. This measures approximately 5.1 x 6.6 x 8.3 cm. There is adjacent airspace opacity favored to relate to compressive atelectasis. A 1.8 x 2.7 cm spiculated nodule is noted in the left lung apex. Scattered calcified granulomas noted bilaterally. Several small tracheal diverticulum noted incidentally. CHEST WALL AND LOWER NECK: Cavitary mass in the right mid posterior thorax as described above. BONES: There is a 5.5 x 6.9 x 5.2  cm soft tissue mass in the left scapula with osseous destruction of the scapular body, incompletely imaged. There is diffuse scattered sclerotic lesions throughout the axial and appendicular skeletal system consistent with metastases. Multilevel degenerative changes of the spine.   ABDOMEN:   LIVER: Within normal limits. BILE DUCTS: Normal caliber. GALLBLADDER: No calcified gallstones. No wall thickening. PANCREAS: Within normal limits. SPLEEN: Within normal limits. ADRENALS: There is a 2 cm heterogeneous hypodense nodule in the left adrenal gland concerning for metastases. Right adrenal glands within normal limits. KIDNEYS: Symmetric renal enhancement. No hydronephrosis or perinephric fluid collection. URETERS: No hydroureter.   VESSELS: Calcified and noncalcified atheromatous plaque is noted in the aortoiliac and mesenteric vessels. There is ectasia of the infrarenal aorta measuring 2.9 x 2.9 cm. RETROPERITONEUM: Within normal limits.   PELVIS:   REPRODUCTIVE ORGANS: Uterus is present. No adnexal mass. BLADDER: Under distended with apparent wall thickening.   BOWEL: Normal caliber. Appendix is not identified with certainty. No pericecal inflammatory changes seen. Moderate stool burden. No pneumatosis or portal venous gas. PERITONEUM: No ascites or free air, no fluid collection. There is mild presacral edema.   ABDOMINAL WALL: Mild body wall edema. BONES: Multilevel degenerative changes of the spine. Diffuse sclerotic lesions in the axial and appendicular skeletal system  consistent with osseous metastases.       There is a large necrotic cavitary mass in the right posterior thorax measuring 5.1 x 6.6 x 8.3 cm. There is osseous destruction and pathologic fracture of the posterior 5th and 6th ribs. A 1.8 x 2.7 cm spiculated nodule is noted in the left lung apex. Findings likely relate to patient's provided history of carcinoma. Correlate with prior workup.   Advanced centrilobular and paraseptal emphysema noted. Moderate right pleural effusion with areas of loculation. Adjacent airspace opacity favored to relate to compressive atelectasis. Superimposed infection not excluded. Correlate clinically.   There is a large heterogeneous hypoattenuating subcarinal mass measuring 4.3 x 4.2 cm with significant mass-effect on the esophagus with loss of fat planes with the esophagus. This is concerning for necrotic lymph node with local invasion not excluded.   There is a 2 cm heterogeneous hypodense nodule in the left adrenal gland concerning for metastases.   There is a 5.5 x 6.9 x 5.2 cm soft tissue mass in the left scapula with osseous destruction of the scapular body, incompletely imaged. There is diffuse scattered sclerotic lesions throughout the axial and appendicular skeletal system consistent with osseous metastases.   Additional findings as described above.     MACRO: None   Signed by: Clau Mcdonald 4/13/2024 11:10 PM Dictation workstation:   [id number]    CT head wo IV contrast    Result Date: 4/13/2024  Interpreted By:  Clau Mcdonald, STUDY: CT HEAD WO IV CONTRAST;  4/13/2024 9:55 pm   INDICATION: Signs/Symptoms:syncope.   COMPARISON: None.   ACCESSION NUMBER(S): [id number]   ORDERING CLINICIAN: YAIR FREEMAN   TECHNIQUE: Axial noncontrast CT images of the head.   FINDINGS: BRAIN PARENCHYMA: Gray-white matter interfaces are preserved. No mass, mass effect or midline shift. Moderate deep and periventricular white matter hypodensities are nonspecific, but favored to represent  chronic small vessel ischemic changes.   HEMORRHAGE: No acute intracranial hemorrhage. VENTRICLES and EXTRA-AXIAL SPACES: Moderate volume loss with prominence of the ventricles and sulci. EXTRACRANIAL SOFT TISSUES: Within normal limits. PARANASAL SINUSES/MASTOIDS: Partial opacification of the maxillary sinuses. Remainder of the visualized paranasal sinuses and mastoid air cells are aerated. CALVARIUM: No depressed skull fracture. No destructive osseous lesion.   OTHER FINDINGS: None.       No acute intracranial abnormality.   Chronic changes as noted above.   MACRO: None   Signed by: Clau Mcdonald 4/13/2024 10:46 PM Dictation workstation:   [id number]    XR chest 1 view    Result Date: 3/24/2024  * * *Final Report* * * DATE OF EXAM: Mar 24 2024  1:08PM   [facility]   [id number]  -  XR CHEST 1V FRONTAL PORT  / ACCESSION #  [id number] PROCEDURE REASON: Fever      * * * * Physician Interpretation * * * *  EXAMINATION:  CHEST RADIOGRAPH (PORTABLE SINGLE VIEW AP) Exam Date/Time:  3/24/2024 1:08 PM Clinical History: Fever MQ:  [id number] Comparison:  Portable AP CXR 02/17/2024 RESULT: Lines, tubes, and devices:  None. Lungs and pleura:  An irregularly marginated nodule suspicious for neoplasm persists in the medial aspect of the left upper lobe. A more masslike opacity, also suspicious for neoplasm, overlies the right mid to upper lung. I suspect upper lobe predominant pulmonary emphysematous changes. Multiple calcified granulomata are scattered in both lungs. There has been development of a right basilar airspace opacity most commonly secondary to atelectasis or pneumonia/aspiration. There is stable blunting of the right costophrenic angle secondary to pleural thickening or small effusion. No pneumothorax is identified. Cardiomediastinal silhouette:  Stable cardiomediastinal silhouette. The heart size is within normal limits. There are atherosclerotic calcifications in the aortic arch. The known subcarinal lymph node mass is not well  visualized radiographically. Other:  There has been lytic osseous resolution of the posterior aspects of the right fifth and sixth ribs, suspicious for neoplastic involvement.    IMPRESSION: See result. : PSCB   Transcribe Date/Time: Mar 24 2024  1:54P Dictated by : NIYAH ENRIQUEZ MD This examination was interpreted and the report reviewed and electronically signed by: NIYAH ENRIQUEZ MD on Mar 24 2024  1:57PM  EST    Vascular US upper extremity venous duplex left    Result Date: 3/23/2024  * * *Final Report* * * DATE OF EXAM: Mar 22 2024  9:51PM   [facility] [place]   [place]3  -  US DVT UPPER LT  / ACCESSION #  [id number] PROCEDURE REASON: Arm deep vein thrombosis (DVT), new symptoms      * * * * Physician Interpretation * * * *  EXAMINATION:  LEFT UPPER EXTREMITY DEEP VENOUS ULTRASOUND WITH DOPPLER IMAGING CLINICAL HISTORY: Arm deep vein thrombosis (DVT), new symptoms TECHNIQUE:  Grayscale with compression maneuvers where accessible, color and spectral Doppler of the left internal jugular, subclavian, and axillary veins was performed.  Grayscale with compression maneuvers of the left brachial, basilic and cephalic veins was also performed. The contralateral internal jugular and distal subclavian veins were imaged for comparison. Images were obtained and stored in a permanent archive. MQ:   [id number] COMPARISON: CT chest 03/16/2024 RESULT: LEFT UPPER EXTREMITY DEEP VEINS Internal Jugular vein: Normal compression, normal spontaneous flow. Subclavian vein:  Normal, spontaneous flow. Axillary vein:  Normal compression, normal spontaneous flow. Brachial vein:  Normal compression SUPERFICIAL VEINS Basilic vein: Normal compression. Cephalic vein:  Normal compression. RIGHT UPPER EXTREMITY (FOR COMPARISON) DEEP VEINS Internal Jugular and Distal Subclavian veins: Normal compression, normal spontaneous flow. Other: Known right supraclavicular lymphadenopathy is better assessed on CT chest 03/16/2024    IMPRESSION: Negative study  for DVT in the left upper extremity. Negative study for superficial thrombophlebitis in the imaged segments of the left upper extremity. : PSCB   Transcribe Date/Time: Mar 22 2024 10:14P Dictated by : COBY MURRAY MD This examination was interpreted and the report reviewed and electronically signed by: CARA TIAN MD on Mar 23 2024  7:31AM  EST    IR VASCULAR ACCESS TEAM MIDLINE INSERTION RADIO    Result Date: 3/21/2024  Liam Castro LPN     3/21/2024  3:41 PM MIDLINE INSERTION PROCEDURE NOTE - PICC TEAM NURSES DATE OF PROCEDURE: 3/21/2024 TIME OF PROCEDURE: 1537 ORDERING PHYSICIAN: Nathaniel Chew MD Indications for line placement: Chemotherapy therapy Condition of line placement: Sterile Primary Proceduralist: Letty Lu RN Assistant: Liam Castro LPN Pre-procedure Review: ALLERGIES No Known Allergies Known history of Upper Venous Thrombosis: No Known history of Permanent Pacemaker or Automated Implanted Cardiac Device: No Previous breast surgery of lymph node dissection: No Estimated Glomerular Filtration Rate Date Value Ref Range Status 03/21/2024 69 >=60 mL/min/1.73m² Final   Comment:   Estimated Glomerular Filtration Rate (eGFR) is calculated using the 2021 CKD-EPI creatinine equation. This equation utilizes serum creatinine, sex, and age as parameters. The creatinine assay has traceable calibration to isotope dilution-mass spectrometry. Refer to KDIGO guidelines for clinical interpretation. In patients with unstable renal function, e.g. those with acute kidney injury, the eGFR may not accurately reflect actual GFR. eGFR- Date Value Ref Range Status 07/05/2023 >60 >60 mL/min/1.73 2 Final   Comment:   MDRD calculation used for eGFR results. History of Renal Disease: No Ultrasound assessment complete: Yes Procedure Narrative Safe Practice: Hand hygiene per hospital policy: Yes Skin preparation used: Chloraprep (CHG + alcohol), allowed to dry Barriers used by  Proceduralist and all assisting personnel: Yes UNIVERSAL PROTOCOL / SAFETY CHECKLIST Procedure to be Performed: Midline Sign In: A Moment of CARE was completed. Personnel directly involved with the procedure wore the appropriate PPE (Personal Protective Equipment). Patient/Surrogate Stated/Verified: PATIENT VERIFIED(optional for EMERGENT procedures): Patient name, Date of Birth, Relevant allergies, and The intended procedure Time Out Communication: Intended patient and procedure match the source documents. Consent documented and matches the intended procedure. Sign Out: SIGN OUT (optional for EMERGENT procedures): No specimen collected. Liam Castro LPN Midline Catheter Placement: Brand:  [employer]                                        Lot:   [id number] Number of lumens: 1 Type of Midline: Power Injectable Midline Lumen size: 4 [place] Placement Technique: Lidocaine: Yes,  Lidocaine 1%  Volume 3 mL Subcutaneous Modified Seldinger Technique use to place line via the: Right Brachial Ultrasound Guidance: Yes Number of attempts at insertion: 1 Ensured control of guidewire during all aspects of the procedure: Yes Accounted for entire guidewire upon removal: Yes Internal length: 8 cm     External length: 0 cm     Trim length:8 cm Mid-Arm circumference: 24 centimeters Post insertion pain level related to procedure: 0 Action taken to address pain: None needed Verified placement: Positive blood return Line was flushed with 20 mL normal saline Line secured with: Securement device Sterile dressing applied and dated: Yes Sterile caps on all ports prior to leaving procedure area: Yes Specimens: None Complications: None Patient education materials: Given to patient Questions or problems: Page [phone] SIGNATURE: Liam Castro LPN PATIENT NAME: Malathi Rubio DATE: March 21, 2024 MRN: [id number] TIME: 2:54 PM PAGER: [phone]                 Assessment/Plan   Principal Problem:    Syncope  Active Problems:    Acute pain    Small cell  lung cancer (Multi)    Malignant neoplasm metastatic to bone (Multi)    COPD (chronic obstructive pulmonary disease) (Multi)    Acute on chronic anemia    Community acquired bacterial pneumonia    Malnutrition (Multi)    PUD (peptic ulcer disease)    Thrombocytopenia (CMS-HCC)    Mixed hyperlipidemia    #Lung cancer with metastases to bone  #COPD, in acute exacerbation   #Acute hypoxic respiratory failure (improving)   - Patient follows with hem/onc at [place]  - No leukocytosis   - COVID, flu, RSV pending   - Procal pending   - BCx negative x 1 day  - Sputum culture if able   - CT chest with large necrotic cavitary mass in the right posterior thorax with osseous destruction; advanced emphysema, airspace opacity favored to represent compressive atelectasis    - Continue Zosyn (day 4)   - Start Solumedrol 40 mg IVP q8h   - Home hydromorphone brought in for pain 2/2 metastases; continue hydromorphone scheduled and PRN   - DuoNebs TID  - Mucinex BID  - Tylenol PRN for fever   - RT eval and treat protocol  - Bronchial hygiene  - Monitor SpO2 continuously   - Baseline O2 None   - Wean O2 as tolerated; patient currently on RA   - Pulmonology consulted, appreciate recs   - Code status discussion completed with patient 4/17; now DNRCCA     #Hypokalemia  #Hypomagnesemia (improving)  - K 3.0 > 2.8, repleted per protocol  - Mag 1.51 > 1.81  - Telemetry monitoring   - Encourage PO intake as tolerated  - Daily BMP and mag level    #AF RVR (resolved)  #Non-MI elevated troponins   - Received Lopressor 5 mg IVP x 1 dose with conversion to NSR  - Currently NSR rate 90s  - Trop  98 > 92 > 80 > 154 > 75, patient denies chest pain   - Telemetry monitoring  - Not on anticoagulation due to anemia  - Cardiology consulted, appreciate recs    #Syncope (resolved)  #Acute on chronic anemia (improving)   #Thrombocytopenia   - Patient had syncopal episode at home while on the toilet; has had poor oral intake at home   - CT head with no acute  intracranial abnormality  - H/H 7.3/22.9, plts 68 today  - Anticoagulation/DVT ppx held  - Patient received 1 unit pRBCs in the ED; will repeat today- consent obtained  - Check iron studies, B12, folate   - Continue NS @ 100 ml/hr   - Encourage PO intake as tolerated  - Telemetry monitoring  - Daily CBC     #Malnutrition 2/2 active malignancy  - Albumin 2.4   - Regular diet ordered  - Nutrition consulted, appreciate recs       DVT PPx: SCDs   GI PPx: Protonix  Diet: Regular  Code Status: DNRCCA/DNI- discussed with patient and spouse 4/17     Disposition: Patient requires inpatient management at this time.     Total accumulated time spent face to face and not face to face preparing to see the patient, obtaining and reviewing separately obtained history; performing a medically appropriate examination and/or evaluation; counseling and educating the patient, family; ordering medications, tests, or procedures; referring and communicating with other health care professionals; documenting clinical information in the patient's medical record; independently interpreting results and communicating the results to the patient, family; and care coordination was 65 minutes.     Virgen Burr PA-C

## 2024-04-17 NOTE — NURSING NOTE
[facility] called and said they have a bed for the patient at [place]. Patient and spouse discussed transfer and decided that they want the patient to stay at [facility] and be treated here now. [facility] staff made aware of patient's decision to stay where she is.

## 2024-04-17 NOTE — CONSULTS
"Nutrition Assessment Note  Nutrition Assessment      Reason for Assessment  Reason for Assessment: Provider consult order (Nutrition consulted for assessment / recommendations.  Noted MST = 3 eating poorly, weight loss.)    Per H&P / Progress Notes:  Pt presented to ED from home with spouse due to syncope, not eating or drinking much recently, feeling weak.  Per , pt has not eaten more than 400 kcal per day.  K+ 3 (L); Alb 2.9 (L); Hgb 5.9 (L) after IV fluids.  CT chest showed \"large necrotic cavitary mass in the right posterior thorax with osseous destruction; advanced emphysema, airspace opacity favored to represent compressive atelectasis.\" She received 1 unit PRBCs in ED, IV fluids, IV antibiotics, K+ was replaced and she was admitted to [facility] on 4/13 with COPD in acute exacerbation, Acute hypoxic respiratory failure.  Pt on 1 L O2 today.  Solu-Medrol added for wheezing.  Pulmonology consulted.  Cardiology consulted for non-MI elevated troponin.  Current diet order is Regular.  NS @ 100 mL/hour.    Past Medical History:   Diagnosis Date    Anemia      Cancer (Multi)       small cell lung cancer    COPD (chronic obstructive pulmonary disease) (Multi)      Emphysema of lung (Multi)      Hypertension      Past Surgical History:   Procedure Laterality Date    APPENDECTOMY   01/29/2016     Appendectomy    LUNG LOBECTOMY   01/29/2016     Lung Lobectomy    OTHER SURGICAL HISTORY   11/15/2013     Thoracoscopy (Therapeutic)     Medications and allergies reviewed.    Pertinent Labs:  4/17/24:  H/H 7.3/22.9 (L)  MCHC 31.9 (L)  K+ 2.8 (L)    Vital Signs:  /61 (BP Location: Left arm, Patient Position: Lying)   Pulse 64   Temp 36 °C (96.8 °F) (Temporal)   Resp 11   Ht 1.651 m (5' 5\")   Wt 57.4 kg (126 lb 8.7 oz)   LMP  (LMP Unknown)   SpO2 99%   BMI 21.06 kg/m²        History:  Food and Nutrient History  Energy Intake: Poor < 50 %  Food and Nutrient History: Visited Malathi in room, she is " "laying in bed and appears to be sleeping.  Her  is also present.  Pt wakes up intermittently although appears lethargic.  Nutrition assessment kept brief.  Per nursing, pt just received pain medication.  Pt amenable to Ensure when offered by this RDN.  RDN also assisted pt with lunch order- she ordered pot roast and mashed potato with gravy, mirian food cake with berries and whip cream, lemon ice and coffee.  Pt's lunch tray viewed later in day on tray cart- she ate 1/3 mashed potato, 50% mirian food cake, 75% lemon ice, bites of pot roast.  Per RN, pt did not eat breakfast today, felt this might have been due to positional discomfort, pain under R breast.  Vitamin/Herbal Supplement Use: not assessed.         Food and Nutrient Administration History  Additional Food and Nutrient Administration History: PO Intake per flowsheet:  4/13 to 4/16- no PO intake documented; 4/17- 0% B                   Anthropometrics:  Height: 165.1 cm (5' 5\")  Weight: 57.4 kg (126 lb 8.7 oz)  BMI (Calculated): 21.06    Weight Change: 0    Weight Change  Weight History / % Weight Change: Weight history per chart:  4/17/24- 57.4 kg.  Weight history per EPIC chart review from OSH:  3/18/24- 59.8 kg (4% loss x 1 month); 1/13/24- 63 kg (8.9% loss x 3 months); 9/28/23- 68.9 kg (16.7% loss x 6 months)- significant; 1/5/23- 73.5 kg (21.9% loss x 15 months)- significant  Significant Weight Loss: Yes  Interpretation of Weight Loss: >10% in 6 months         IBW/kg (Dietitian Calculated): 63.2 kg  Percent of IBW: 90.8 %     Wt Readings from Last 10 Encounters:   04/17/24 57.4 kg (126 lb 8.7 oz)                               Energy Needs:  Calculated Energy Needs Using Equations  Height: 165.1 cm (5' 5\")  Weight Used for Equation Calculations: 57.4 kg (126 lb 8.7 oz)  [place] Equation (Overweight or Obese Patients): 1110  Temp: 36 °C (96.8 °F)    Estimated Energy Needs  Total Energy Estimated Needs (kCal): 2010 kCal  Total Estimated " Energy Need per Day (kCal/kg): 35 kCal/kg  Method for Estimating Needs: 1725 to 2010 kcal/day (30 to 35 kcal/kg actual weight)    Estimated Protein Needs  Total Protein Estimated Needs (g): 86 g  Total Protein Estimated Needs (g/kg): 1.5 g/kg  Method for Estimating Needs: 1.5 g/kg actual weight    Estimated Fluid Needs  Total Fluid Estimated Needs (mL): 2010 mL  Method for Estimating Needs: 1 mL/kcal or fluid per medical team.         Nutrition Focused Physical Findings:  Subcutaneous Fat Loss  Orbital Fat Pads: Defer (Pt lethargic at time of visit; appears uncomfortable; will defer NFPE at this time.)    Muscle Wasting  Temporalis: Defer (Pt lethargic at time of visit; appears uncomfortable; will defer NFPE at this time.)    Edema  Edema: +1 trace  Edema Location: b/l LE per nursing flowsheet.         Physical Findings (Nutrition Deficiency/Toxicity)  Skin: Negative (No skin breakdown documented on nursing flowsheet.)       Nutrition Diagnosis   Malnutrition Diagnosis  Patient has Malnutrition Diagnosis: Yes  Diagnosis Status: New  Malnutrition Diagnosis: Severe malnutrition related to chronic disease or condition  As Evidenced by: significant unintentional weight loss of 8.9% x 3 months and 16.7% x 6 months; poor nutritional intake meeting less than 75% of estimated needs for greater than or equal to one month.                                                             Nutrition Interventions/Recommendations   Nutrition Prescription  Individualized Nutrition Prescription Provided for : diet, fluids, oral supplements    Food and/or Nutrient Delivery Interventions  Meals and Snacks: Energy-modified diet, Protein-modified diet  Goal: Continue Regular liberalized diet; Encourage intake of protein foods at each meal.                                    Medical Food Supplement: Commercial beverage  Goal: Ensure Plus High Protein TID (1050 kcal/60 g protein)                             Additional Interventions: 1.   Monitor lytes and replace as needed; 2.  Check weight 2 to 3 times per week.         Coordination of Nutrition Care by a Nutrition Professional  Collaboration and Referral of Nutrition Care: Collaboration by nutrition professional with other providers  Goal: Pt reviewed at IDT Rounds; Opal JOSEPH RN    Education Documentation  No documentation found.    -not indicated.       Nutrition Monitoring and Evaluation   Food and Nutrient Related History  Energy Intake: Estimated energy intake  Criteria: Nutritional intake meeting > 75% of estimated needs.    Fluid Intake: Estimated fluid intake  Criteria: Fluid intake meeting estimated needs.    Amount of Food: Medical food intake  Criteria: Pt will consume Ensure Plus High Protein TID with good tolerance.                             Anthropometrics: Body Composition/Growth/Weight History  Weight: Measured weight  Criteria: Gradual non-fluid weight gain to acceptable BMI 23 to < 25 kg/m2.                   Biochemical Data, Medical Tests and Procedures                 Nutritional Anemia Profile: Hematocrit, Hemoglobin  Criteria: H/H wnl         Nutrition Focused Physical Findings                                     Follow Up  Time Spent (min): 65 minutes  Last Date of Nutrition Visit: 04/17/24  Nutrition Follow-Up Needed?: 3-5 days, Dietitian to reassess per policy  Follow up Comment: % meals; ONS

## 2024-04-17 NOTE — CONSULTS
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 Diagnosed in June 2023 @ [facility]. Treated with carbo/etoposide + atezolizumab x 4 (Jul-Sep 2023) => Maintenance atezo (Oct 2023 - Jan 2024). Atezolizumab was discontinued due to grade 3-4 IO related diarrhea that required hospitalization.   Also received palliative RT to mediastinum in Dec 2023 and gamma knife to solitary brain met in Feb 2024.   Was found to have progressive disease in March and was started on Carbo-paclitaxel on 3/21/24.   Now, admitted with severe anemia. Also has thrombocytopenia.   I reviewed her last CT images. I compared the last CT report to the ones done @ [facility] in March 2024 before she was started on Carbo-taxol. Both subcarinal LAP and R thoracic mass seems to have increased in size after one cycle of carbo-paclitaxel. She is unlikely to respond to that.   Her performance status is poor but seems to be willing to continue therapy. She is not sure if she wants to continue therapy @ [facility] Here in [place]. If she decides to continue therapy here, we would offer her lurbinectedin. She will think about this. I will see her tomorrow.     Meanwhile, she needs to continue antibiotics and needs blood transfusions to keep Hb above 8 g/dL. Her anemia is likely at least partly due to bone marrow involvement which is a poor sign. I will ask for a retic count tomorrow AM.           Reason For Consult  Small cell lung cancer, metastatic    History Of Present Illness  Malathi Rubio is a 68 y.o. female presenting with severe anemia and fatigue.     SCLC mets to brain, liver, bones, adrenal. Dx in June 2023.   1) Carboplatin and etoposide C1 7/6/23 followed by Carboplatin, etoposide, and atezolizumab C2 8/3/23, C4 9/28/23  2) Palliative  "RT to right lung/mediastinum 11/30/23   3) Atezolizumab maintenance 10/26/23 - 12/21/23  4) GKRS to solitary brain met 2/26/24  5) Carbo/taxol x1 in Mar 2024     Past Medical History  She has a past medical history of Anemia, Cancer (Multi), COPD (chronic obstructive pulmonary disease) (Multi), Emphysema of lung (Multi), and Hypertension.    Surgical History  She has a past surgical history that includes Other surgical history (11/15/2013); Lung lobectomy (01/29/2016); and Appendectomy (01/29/2016).     Social History  She reports that she has been smoking cigarettes. She has never used smokeless tobacco. She reports that she does not drink alcohol and does not use drugs.    Family History  No family history on file.     Allergies  Patient has no known allergies.    Review of Systems   Constitutional:  Positive for activity change, appetite change and fatigue. Negative for fever.   Respiratory:  Positive for cough and shortness of breath. Negative for choking, wheezing and stridor.    Cardiovascular:  Negative for chest pain, palpitations and leg swelling.   Genitourinary: Negative.         Physical Exam  HENT:      Head: Normocephalic.   Eyes:      Pupils: Pupils are equal, round, and reactive to light.   Cardiovascular:      Rate and Rhythm: Regular rhythm. Tachycardia present.   Pulmonary:      Effort: Pulmonary effort is normal.   Abdominal:      General: Abdomen is flat.   Neurological:      General: No focal deficit present.      Mental Status: She is alert.          Last Recorded Vitals  Blood pressure 124/69, pulse 71, temperature 36.1 °C (97 °F), temperature source Temporal, resp. rate 12, height 1.651 m (5' 5\"), weight 57.4 kg (126 lb 8.7 oz), SpO2 100%.    Relevant Results  See chart for lab results. See above for assessment.     Assessment/Plan     See above     I spent 60 minutes in the professional and overall care of this patient.          "

## 2024-04-17 NOTE — NURSING NOTE
Patient afebrile. Patient and spouse made aware of possible S/S of a blood transfusion reaction. Unit of irradiated PRBCs started, per order. Call light within reach.

## 2024-04-17 NOTE — NURSING NOTE
Patient c/o feeling hot; personal fan provided. Patient afebrile. Patient attempted to have a BM on bedpan, passed flatus, but unable to have a BM. Spouse at bedside.

## 2024-04-18 ENCOUNTER — APPOINTMENT (OUTPATIENT)
Dept: CARDIOLOGY | Facility: HOSPITAL | Age: 69
DRG: 180 | End: 2024-04-18
Payer: MEDICARE

## 2024-04-18 ENCOUNTER — TELEPHONE (OUTPATIENT)
Dept: HEMATOLOGY/ONCOLOGY | Facility: HOSPITAL | Age: 69
End: 2024-04-18
Payer: MEDICARE

## 2024-04-18 DIAGNOSIS — C34.90 SMALL CELL LUNG CANCER (MULTI): Primary | ICD-10-CM

## 2024-04-18 LAB
ANION GAP SERPL CALC-SCNC: 17 MMOL/L (ref 10–20)
BUN SERPL-MCNC: 17 MG/DL (ref 6–23)
CALCIUM SERPL-MCNC: 9.2 MG/DL (ref 8.6–10.3)
CARDIAC TROPONIN I PNL SERPL HS: 68 NG/L (ref 0–13)
CHLORIDE SERPL-SCNC: 104 MMOL/L (ref 98–107)
CO2 SERPL-SCNC: 20 MMOL/L (ref 21–32)
CREAT SERPL-MCNC: 0.77 MG/DL (ref 0.5–1.05)
EGFRCR SERPLBLD CKD-EPI 2021: 85 ML/MIN/1.73M*2
ERYTHROCYTE [DISTWIDTH] IN BLOOD BY AUTOMATED COUNT: 20 % (ref 11.5–14.5)
FERRITIN SERPL-MCNC: 2540 NG/ML (ref 8–150)
FOLATE SERPL-MCNC: 6.5 NG/ML
GLUCOSE SERPL-MCNC: 163 MG/DL (ref 74–99)
HCT VFR BLD AUTO: 36.3 % (ref 36–46)
HGB BLD-MCNC: 11.9 G/DL (ref 12–16)
HGB RETIC QN: 35 PG (ref 28–38)
HOLD SPECIMEN: NORMAL
HOLD SPECIMEN: NORMAL
IMMATURE RETIC FRACTION: 10.1 %
IRON SATN MFR SERPL: ABNORMAL %
IRON SERPL-MCNC: 80 UG/DL (ref 35–150)
LDH SERPL L TO P-CCNC: 122 U/L (ref 84–246)
MAGNESIUM SERPL-MCNC: 1.78 MG/DL (ref 1.6–2.4)
MCH RBC QN AUTO: 31.2 PG (ref 26–34)
MCHC RBC AUTO-ENTMCNC: 32.8 G/DL (ref 32–36)
MCV RBC AUTO: 95 FL (ref 80–100)
NRBC BLD-RTO: 0 /100 WBCS (ref 0–0)
PLATELET # BLD AUTO: 82 X10*3/UL (ref 150–450)
POTASSIUM SERPL-SCNC: 3.4 MMOL/L (ref 3.5–5.3)
POTASSIUM SERPL-SCNC: 3.9 MMOL/L (ref 3.5–5.3)
PROCALCITONIN SERPL-MCNC: 0.43 NG/ML
RBC # BLD AUTO: 3.82 X10*6/UL (ref 4–5.2)
RETICS #: 0.06 X10*6/UL (ref 0.02–0.11)
RETICS/RBC NFR AUTO: 1.7 % (ref 0.5–2)
SODIUM SERPL-SCNC: 138 MMOL/L (ref 136–145)
TIBC SERPL-MCNC: ABNORMAL UG/DL
UIBC SERPL-MCNC: <55 UG/DL (ref 110–370)
VIT B12 SERPL-MCNC: 1237 PG/ML (ref 211–911)
WBC # BLD AUTO: 5.5 X10*3/UL (ref 4.4–11.3)

## 2024-04-18 PROCEDURE — 1200000002 HC GENERAL ROOM WITH TELEMETRY DAILY: Mod: IPSPLIT

## 2024-04-18 PROCEDURE — 94668 MNPJ CHEST WALL SBSQ: CPT | Mod: IPSPLIT

## 2024-04-18 PROCEDURE — 2500000005 HC RX 250 GENERAL PHARMACY W/O HCPCS: Mod: IPSPLIT | Performed by: INTERNAL MEDICINE

## 2024-04-18 PROCEDURE — 82728 ASSAY OF FERRITIN: CPT | Mod: IPSPLIT | Performed by: INTERNAL MEDICINE

## 2024-04-18 PROCEDURE — 94760 N-INVAS EAR/PLS OXIMETRY 1: CPT | Mod: IPSPLIT

## 2024-04-18 PROCEDURE — 85045 AUTOMATED RETICULOCYTE COUNT: CPT | Mod: IPSPLIT | Performed by: INTERNAL MEDICINE

## 2024-04-18 PROCEDURE — 93005 ELECTROCARDIOGRAM TRACING: CPT | Mod: IPSPLIT

## 2024-04-18 PROCEDURE — 2500000001 HC RX 250 WO HCPCS SELF ADMINISTERED DRUGS (ALT 637 FOR MEDICARE OP): Mod: IPSPLIT | Performed by: NURSE PRACTITIONER

## 2024-04-18 PROCEDURE — 82746 ASSAY OF FOLIC ACID SERUM: CPT | Mod: GENLAB | Performed by: INTERNAL MEDICINE

## 2024-04-18 PROCEDURE — 2500000001 HC RX 250 WO HCPCS SELF ADMINISTERED DRUGS (ALT 637 FOR MEDICARE OP): Mod: IPSPLIT

## 2024-04-18 PROCEDURE — 80048 BASIC METABOLIC PNL TOTAL CA: CPT | Mod: IPSPLIT

## 2024-04-18 PROCEDURE — 2500000005 HC RX 250 GENERAL PHARMACY W/O HCPCS: Mod: IPSPLIT | Performed by: NURSE PRACTITIONER

## 2024-04-18 PROCEDURE — 2500000004 HC RX 250 GENERAL PHARMACY W/ HCPCS (ALT 636 FOR OP/ED): Mod: IPSPLIT | Performed by: NURSE PRACTITIONER

## 2024-04-18 PROCEDURE — 36415 COLL VENOUS BLD VENIPUNCTURE: CPT | Mod: IPSPLIT

## 2024-04-18 PROCEDURE — 85027 COMPLETE CBC AUTOMATED: CPT | Mod: IPSPLIT

## 2024-04-18 PROCEDURE — 82607 VITAMIN B-12: CPT | Mod: GENLAB | Performed by: INTERNAL MEDICINE

## 2024-04-18 PROCEDURE — 83735 ASSAY OF MAGNESIUM: CPT | Mod: IPSPLIT

## 2024-04-18 PROCEDURE — 2500000004 HC RX 250 GENERAL PHARMACY W/ HCPCS (ALT 636 FOR OP/ED): Mod: IPSPLIT

## 2024-04-18 PROCEDURE — 83615 LACTATE (LD) (LDH) ENZYME: CPT | Mod: IPSPLIT | Performed by: INTERNAL MEDICINE

## 2024-04-18 PROCEDURE — 2500000006 HC RX 250 W HCPCS SELF ADMINISTERED DRUGS (ALT 637 FOR ALL PAYERS): Mod: IPSPLIT | Performed by: NURSE PRACTITIONER

## 2024-04-18 PROCEDURE — 83010 ASSAY OF HAPTOGLOBIN QUANT: CPT | Performed by: INTERNAL MEDICINE

## 2024-04-18 PROCEDURE — 99233 SBSQ HOSP IP/OBS HIGH 50: CPT

## 2024-04-18 PROCEDURE — 85060 BLOOD SMEAR INTERPRETATION: CPT | Performed by: PATHOLOGY

## 2024-04-18 PROCEDURE — 83550 IRON BINDING TEST: CPT | Mod: IPSPLIT,91 | Performed by: INTERNAL MEDICINE

## 2024-04-18 PROCEDURE — 99233 SBSQ HOSP IP/OBS HIGH 50: CPT | Performed by: INTERNAL MEDICINE

## 2024-04-18 RX ORDER — DIPHENHYDRAMINE HYDROCHLORIDE 50 MG/ML
50 INJECTION INTRAMUSCULAR; INTRAVENOUS AS NEEDED
OUTPATIENT
Start: 2024-04-24

## 2024-04-18 RX ORDER — GABAPENTIN 300 MG/1
300 CAPSULE ORAL 2 TIMES DAILY
Status: DISCONTINUED | OUTPATIENT
Start: 2024-04-18 | End: 2024-04-22

## 2024-04-18 RX ORDER — ALUMINUM HYDROXIDE, MAGNESIUM HYDROXIDE, AND SIMETHICONE 1200; 120; 1200 MG/30ML; MG/30ML; MG/30ML
30 SUSPENSION ORAL 4 TIMES DAILY PRN
Status: DISCONTINUED | OUTPATIENT
Start: 2024-04-18 | End: 2024-04-25 | Stop reason: HOSPADM

## 2024-04-18 RX ORDER — PROCHLORPERAZINE MALEATE 5 MG
10 TABLET ORAL EVERY 6 HOURS PRN
OUTPATIENT
Start: 2024-04-24

## 2024-04-18 RX ORDER — FAMOTIDINE 10 MG/ML
20 INJECTION INTRAVENOUS ONCE AS NEEDED
OUTPATIENT
Start: 2024-04-24

## 2024-04-18 RX ORDER — PALONOSETRON 0.05 MG/ML
0.25 INJECTION, SOLUTION INTRAVENOUS ONCE
OUTPATIENT
Start: 2024-04-24

## 2024-04-18 RX ORDER — PROCHLORPERAZINE EDISYLATE 5 MG/ML
10 INJECTION INTRAMUSCULAR; INTRAVENOUS EVERY 6 HOURS PRN
OUTPATIENT
Start: 2024-04-24

## 2024-04-18 RX ORDER — ONDANSETRON HYDROCHLORIDE 8 MG/1
8 TABLET, FILM COATED ORAL EVERY 8 HOURS PRN
Qty: 30 TABLET | Refills: 5 | Status: SHIPPED | OUTPATIENT
Start: 2024-04-18

## 2024-04-18 RX ORDER — PROCHLORPERAZINE MALEATE 10 MG
10 TABLET ORAL EVERY 6 HOURS PRN
Qty: 30 TABLET | Refills: 5 | Status: SHIPPED | OUTPATIENT
Start: 2024-04-18

## 2024-04-18 RX ORDER — MAGNESIUM SULFATE HEPTAHYDRATE 40 MG/ML
2 INJECTION, SOLUTION INTRAVENOUS DAILY
Status: DISCONTINUED | OUTPATIENT
Start: 2024-04-18 | End: 2024-04-25 | Stop reason: HOSPADM

## 2024-04-18 RX ORDER — POTASSIUM CHLORIDE 14.9 MG/ML
20 INJECTION INTRAVENOUS
Status: COMPLETED | OUTPATIENT
Start: 2024-04-18 | End: 2024-04-18

## 2024-04-18 RX ORDER — EPINEPHRINE 0.3 MG/.3ML
0.3 INJECTION SUBCUTANEOUS EVERY 5 MIN PRN
OUTPATIENT
Start: 2024-04-24

## 2024-04-18 RX ORDER — ALBUTEROL SULFATE 0.83 MG/ML
3 SOLUTION RESPIRATORY (INHALATION) AS NEEDED
OUTPATIENT
Start: 2024-04-24

## 2024-04-18 RX ORDER — METOPROLOL TARTRATE 1 MG/ML
5 INJECTION, SOLUTION INTRAVENOUS ONCE
Status: COMPLETED | OUTPATIENT
Start: 2024-04-18 | End: 2024-04-18

## 2024-04-18 RX ADMIN — METHYLPREDNISOLONE SODIUM SUCCINATE 40 MG: 40 INJECTION, POWDER, FOR SOLUTION INTRAMUSCULAR; INTRAVENOUS at 18:16

## 2024-04-18 RX ADMIN — SODIUM CHLORIDE 100 ML/HR: 9 INJECTION, SOLUTION INTRAVENOUS at 06:56

## 2024-04-18 RX ADMIN — MAGNESIUM SULFATE IN WATER 2 G: 40 INJECTION, SOLUTION INTRAVENOUS at 15:10

## 2024-04-18 RX ADMIN — SODIUM CHLORIDE 100 ML/HR: 9 INJECTION, SOLUTION INTRAVENOUS at 18:15

## 2024-04-18 RX ADMIN — PIPERACILLIN SODIUM AND TAZOBACTAM SODIUM 3.38 G: 3; .375 INJECTION, SOLUTION INTRAVENOUS at 20:24

## 2024-04-18 RX ADMIN — SENNOSIDES AND DOCUSATE SODIUM 2 TABLET: 8.6; 5 TABLET ORAL at 20:56

## 2024-04-18 RX ADMIN — POLYETHYLENE GLYCOL 3350 17 G: 17 POWDER, FOR SOLUTION ORAL at 08:18

## 2024-04-18 RX ADMIN — HYDROMORPHONE HYDROCHLORIDE 2 MG: 2 TABLET ORAL at 12:38

## 2024-04-18 RX ADMIN — Medication 2 L/MIN: at 20:00

## 2024-04-18 RX ADMIN — GABAPENTIN 300 MG: 300 CAPSULE ORAL at 13:40

## 2024-04-18 RX ADMIN — POTASSIUM CHLORIDE 20 MEQ: 14.9 INJECTION, SOLUTION INTRAVENOUS at 09:34

## 2024-04-18 RX ADMIN — POTASSIUM CHLORIDE 40 MEQ: 1500 TABLET, EXTENDED RELEASE ORAL at 09:30

## 2024-04-18 RX ADMIN — GABAPENTIN 300 MG: 300 CAPSULE ORAL at 20:24

## 2024-04-18 RX ADMIN — PIPERACILLIN SODIUM AND TAZOBACTAM SODIUM 3.38 G: 3; .375 INJECTION, SOLUTION INTRAVENOUS at 14:32

## 2024-04-18 RX ADMIN — HYDROMORPHONE HYDROCHLORIDE 2 MG: 2 TABLET ORAL at 02:02

## 2024-04-18 RX ADMIN — HYDROMORPHONE HYDROCHLORIDE 16 MG: 16 TABLET, EXTENDED RELEASE ORAL at 09:00

## 2024-04-18 RX ADMIN — ALUMINUM HYDROXIDE, MAGNESIUM HYDROXIDE, AND SIMETHICONE 30 ML: 200; 200; 20 SUSPENSION ORAL at 00:45

## 2024-04-18 RX ADMIN — AMIODARONE HYDROCHLORIDE 400 MG: 200 TABLET ORAL at 08:17

## 2024-04-18 RX ADMIN — PANTOPRAZOLE SODIUM 40 MG: 40 TABLET, DELAYED RELEASE ORAL at 06:00

## 2024-04-18 RX ADMIN — PIPERACILLIN SODIUM AND TAZOBACTAM SODIUM 3.38 G: 3; .375 INJECTION, SOLUTION INTRAVENOUS at 08:18

## 2024-04-18 RX ADMIN — PIPERACILLIN SODIUM AND TAZOBACTAM SODIUM 3.38 G: 3; .375 INJECTION, SOLUTION INTRAVENOUS at 03:00

## 2024-04-18 RX ADMIN — METHYLPREDNISOLONE SODIUM SUCCINATE 40 MG: 40 INJECTION, POWDER, FOR SOLUTION INTRAMUSCULAR; INTRAVENOUS at 10:02

## 2024-04-18 RX ADMIN — SENNOSIDES AND DOCUSATE SODIUM 2 TABLET: 8.6; 5 TABLET ORAL at 08:17

## 2024-04-18 RX ADMIN — HYDROMORPHONE HYDROCHLORIDE 2 MG: 2 TABLET ORAL at 17:56

## 2024-04-18 RX ADMIN — METHYLPREDNISOLONE SODIUM SUCCINATE 40 MG: 40 INJECTION, POWDER, FOR SOLUTION INTRAMUSCULAR; INTRAVENOUS at 01:40

## 2024-04-18 RX ADMIN — METOPROLOL TARTRATE 5 MG: 5 INJECTION INTRAVENOUS at 10:02

## 2024-04-18 ASSESSMENT — COGNITIVE AND FUNCTIONAL STATUS - GENERAL
DAILY ACTIVITIY SCORE: 12
WALKING IN HOSPITAL ROOM: A LOT
EATING MEALS: A LITTLE
CLIMB 3 TO 5 STEPS WITH RAILING: A LOT
HELP NEEDED FOR BATHING: A LOT
TOILETING: A LOT
STANDING UP FROM CHAIR USING ARMS: A LOT
TURNING FROM BACK TO SIDE WHILE IN FLAT BAD: A LOT
PERSONAL GROOMING: A LOT
MOVING FROM LYING ON BACK TO SITTING ON SIDE OF FLAT BED WITH BEDRAILS: A LITTLE
DRESSING REGULAR UPPER BODY CLOTHING: TOTAL
MOBILITY SCORE: 13
MOVING TO AND FROM BED TO CHAIR: A LOT
DRESSING REGULAR LOWER BODY CLOTHING: A LOT

## 2024-04-18 ASSESSMENT — PAIN - FUNCTIONAL ASSESSMENT
PAIN_FUNCTIONAL_ASSESSMENT: 0-10
PAIN_FUNCTIONAL_ASSESSMENT: CPOT (CRITICAL CARE PAIN OBSERVATION TOOL)
PAIN_FUNCTIONAL_ASSESSMENT: 0-10
PAIN_FUNCTIONAL_ASSESSMENT: WONG-BAKER FACES

## 2024-04-18 ASSESSMENT — PAIN SCALES - GENERAL
PAINLEVEL_OUTOF10: 8
PAINLEVEL_OUTOF10: 2
PAINLEVEL_OUTOF10: 0 - NO PAIN
PAINLEVEL_OUTOF10: 2
PAINLEVEL_OUTOF10: 5 - MODERATE PAIN
PAINLEVEL_OUTOF10: 10 - WORST POSSIBLE PAIN
PAINLEVEL_OUTOF10: 0 - NO PAIN
PAINLEVEL_OUTOF10: 3
PAINLEVEL_OUTOF10: 4
PAINLEVEL_OUTOF10: 0 - NO PAIN
PAINLEVEL_OUTOF10: 3

## 2024-04-18 ASSESSMENT — PAIN SCALES - PAIN ASSESSMENT IN ADVANCED DEMENTIA (PAINAD): NEGVOCALIZATION: OCCASIONAL MOAN/GROAN, LOW SPEECH, NEGATIVE/DISAPPROVING QUALITY

## 2024-04-18 ASSESSMENT — PAIN SCALES - WONG BAKER
WONGBAKER_NUMERICALRESPONSE: NO HURT
WONGBAKER_NUMERICALRESPONSE: NO HURT

## 2024-04-18 ASSESSMENT — PAIN DESCRIPTION - LOCATION
LOCATION: GENERALIZED
LOCATION: OTHER (COMMENT)

## 2024-04-18 NOTE — CARE PLAN
The patient's goals for the shift include pain relief    The clinical goals for the shift include maintain vitals    Over the shift, the patient did make progress toward the following goals.  Bp WNL. HR remained SR. Pt maintained sats on RA; however placed on 2L when she became confused this morning. She reorients easily.

## 2024-04-18 NOTE — PROGRESS NOTES
Malathi Rubio is a 68 y.o. female on day 2 of admission presenting with Syncope.    Subjective     Patient had epigastric pain overnight after eating a turkey sandwich; Maalox resolved the pain.     Patient resting comfortably with  at the bedside. They declined transfer to [place] when a bed was available last night, stating that they prefer to continue the patient's care at [facility]. Oncology is following the patient here.  She was noted to be in AF with rate 120s this morning, Lopressor 5 mg IVP x 1 dose given per cardiology with improvement in HR. Patient denies any dizziness or palpitations. She still has epigastric pain and had difficulty swallowing her potassium pill this morning. She reports occasional difficulty with large pills but has had not dysphagia with food or liquids. Will continue to monitor; no need for speech evaluation at this time. PT/OT evaluations were added today. Plan of care discussed with patient and spouse, all questions answered.         Objective     Physical Exam  Constitutional:       General: She is not in acute distress.     Appearance: She is ill-appearing.   HENT:      Head: Normocephalic and atraumatic.      Mouth/Throat:      Mouth: Mucous membranes are moist.   Eyes:      Conjunctiva/sclera: Conjunctivae normal.   Cardiovascular:      Rate and Rhythm: Tachycardia present. Rhythm irregular.      Heart sounds: No murmur heard.  Pulmonary:      Effort: No respiratory distress.      Breath sounds: Rhonchi and rales present. No wheezing.   Abdominal:      General: There is no distension.      Palpations: Abdomen is soft.      Tenderness: There is no abdominal tenderness.   Musculoskeletal:         General: No swelling.   Skin:     General: Skin is warm and dry.      Coloration: Skin is pale.   Neurological:      Mental Status: She is alert and oriented to person, place, and time.   Psychiatric:         Mood and Affect: Mood normal.         Behavior: Behavior normal.  "        Last Recorded Vitals  Blood pressure 137/79, pulse 105, temperature 36 °C (96.8 °F), temperature source Temporal, resp. rate 10, height 1.651 m (5' 5\"), weight 60.4 kg (133 lb 2.5 oz), SpO2 98%.  Intake/Output last 3 Shifts:  I/O last 3 completed shifts:  In: 5439.7 (90.1 mL/kg) [P.O.:1498; I.V.:3194.4 (52.9 mL/kg); Blood:350; IV Piggyback:397.2]  Out: 2045 (33.9 mL/kg) [Urine:2045 (0.9 mL/kg/hr)]  Weight: 60.4 kg     Relevant Results     This patient currently has cardiac telemetry ordered; if you would like to modify or discontinue the telemetry order, click here to go to the orders activity to modify/discontinue the order.    Scheduled medications  amiodarone, 400 mg, oral, Daily   Followed by  [START ON 5/1/2024] amiodarone, 200 mg, oral, Daily  [Held by provider] enoxaparin, 40 mg, subcutaneous, q24h  gabapentin, 300 mg, oral, Nightly  HYDROmorphone, 16 mg, oral, q24h CHEIKH  methylPREDNISolone sodium succinate (PF), 40 mg, intravenous, q8h  oxygen, , inhalation, Continuous - Inhalation  pantoprazole, 40 mg, oral, Daily before breakfast  piperacillin-tazobactam, 3.375 g, intravenous, q6h  polyethylene glycol, 17 g, oral, Daily  potassium chloride CR, 40 mEq, oral, Daily  sennosides-docusate sodium, 2 tablet, oral, BID      Continuous medications  sodium chloride 0.9%, 10 mL/hr  sodium chloride 0.9%, 100 mL/hr, Last Rate: 100 mL/hr (04/18/24 0656)      PRN medications  PRN medications: acetaminophen, acetaminophen, alum-mag hydroxide-simeth, benzocaine-menthol, dextromethorphan-guaifenesin, gabapentin, guaiFENesin, HYDROmorphone, ipratropium-albuteroL, metoprolol, ondansetron, ondansetron **OR** [DISCONTINUED] ondansetron, sodium chloride 0.9%    Results for orders placed or performed during the hospital encounter of 04/13/24 (from the past 24 hour(s))   Procalcitonin   Result Value Ref Range    Procalcitonin 0.43 (H) <=0.07 ng/mL   CBC   Result Value Ref Range    WBC 4.6 4.4 - 11.3 x10*3/uL    nRBC 0.0 0.0 " - 0.0 /100 WBCs    RBC 3.44 (L) 4.00 - 5.20 x10*6/uL    Hemoglobin 10.8 (L) 12.0 - 16.0 g/dL    Hematocrit 33.2 (L) 36.0 - 46.0 %    MCV 97 80 - 100 fL    MCH 31.4 26.0 - 34.0 pg    MCHC 32.5 32.0 - 36.0 g/dL    RDW 19.9 (H) 11.5 - 14.5 %    Platelets 72 (L) 150 - 450 x10*3/uL   Potassium   Result Value Ref Range    Potassium 3.9 3.5 - 5.3 mmol/L   Troponin I, High Sensitivity   Result Value Ref Range    Troponin I, High Sensitivity 68 (HH) 0 - 13 ng/L   CBC   Result Value Ref Range    WBC 5.5 4.4 - 11.3 x10*3/uL    nRBC 0.0 0.0 - 0.0 /100 WBCs    RBC 3.82 (L) 4.00 - 5.20 x10*6/uL    Hemoglobin 11.9 (L) 12.0 - 16.0 g/dL    Hematocrit 36.3 36.0 - 46.0 %    MCV 95 80 - 100 fL    MCH 31.2 26.0 - 34.0 pg    MCHC 32.8 32.0 - 36.0 g/dL    RDW 20.0 (H) 11.5 - 14.5 %    Platelets 82 (L) 150 - 450 x10*3/uL   Basic Metabolic Panel   Result Value Ref Range    Glucose 163 (H) 74 - 99 mg/dL    Sodium 138 136 - 145 mmol/L    Potassium 3.4 (L) 3.5 - 5.3 mmol/L    Chloride 104 98 - 107 mmol/L    Bicarbonate 20 (L) 21 - 32 mmol/L    Anion Gap 17 10 - 20 mmol/L    Urea Nitrogen 17 6 - 23 mg/dL    Creatinine 0.77 0.50 - 1.05 mg/dL    eGFR 85 >60 mL/min/1.73m*2    Calcium 9.2 8.6 - 10.3 mg/dL   Magnesium   Result Value Ref Range    Magnesium 1.78 1.60 - 2.40 mg/dL   Reticulocytes   Result Value Ref Range    Retic % 1.7 0.5 - 2.0 %    Retic Absolute 0.064 0.017 - 0.110 x10*6/uL    Reticulocyte Hemoglobin 35 28 - 38 pg    Immature Retic fraction 10.1 <=16.0 %   Iron and TIBC   Result Value Ref Range    Iron 80 35 - 150 ug/dL    UIBC <55 (L) 110 - 370 ug/dL    TIBC      % Saturation     Ferritin   Result Value Ref Range    Ferritin 2,540 (H) 8 - 150 ng/mL   Lactate dehydrogenase   Result Value Ref Range     84 - 246 U/L   SST TOP   Result Value Ref Range    Extra Tube Hold for add-ons.    SST TOP   Result Value Ref Range    Extra Tube Hold for add-ons.        Electrocardiogram, 12-lead PRN ACS symptoms    Result Date:  4/17/2024  Atrial fibrillation with rapid ventricular response Nonspecific ST abnormality Abnormal ECG When compared with ECG of 13-APR-2024 21:00, Atrial fibrillation has replaced Sinus rhythm Nonspecific T wave abnormality no longer evident in Inferior leads    ECG 12 lead    Result Date: 4/16/2024  Sinus tachycardia with occasional Premature ventricular complexes Nonspecific ST and T wave abnormality Abnormal ECG When compared with ECG of 10-AUG-2015 23:27, Premature ventricular complexes are now Present Vent. rate has increased BY  65 BPM ST now depressed in Anterior leads Nonspecific T wave abnormality now evident in Anterolateral leads See ED provider note for full interpretation and clinical correlation Confirmed by Denisa Looney ([id number]) on 4/16/2024 2:34:25 PM    ECG 12 lead    Result Date: 4/16/2024  Sinus tachycardia Otherwise normal ECG When compared with ECG of 13-APR-2024 19:59, (unconfirmed) Premature ventricular complexes are no longer Present Nonspecific T wave abnormality now evident in Inferior leads Nonspecific T wave abnormality no longer evident in Lateral leads See ED provider note for full interpretation and clinical correlation Confirmed by Denisa Looney ([id number]) on 4/16/2024 2:26:29 PM    CT chest abdomen pelvis w IV contrast    Result Date: 4/13/2024  Interpreted By:  Clau Mcdonald, STUDY: CT CHEST ABDOMEN PELVIS W IV CONTRAST;  4/13/2024 10:05 pm   INDICATION: Signs/Symptoms:syncope with known small cell.   COMPARISON: CT scan of the abdomen and pelvis 08/11/2015.   ACCESSION NUMBER(S): [id number]   ORDERING CLINICIAN: YAIR FREEMAN   TECHNIQUE: Axial CT images of the chest, abdomen and pelvis with coronal and sagittal reconstructed images obtained after intravenous administration of 75 mL of Omnipaque 350.   FINDINGS: CHEST:   VESSELS: Aorta and pulmonary artery are normal caliber. Atherosclerotic changes in the aorta and arch vessels. HEART: Normal size. No pericardial  effusion. Aortic root and mild coronary artery calcifications noted. MEDIASTINUM AND DARNELL: There is a large heterogeneous hypoattenuating subcarinal mass measuring 4.3 x 4.2 cm with significant mass-effect on the esophagus with loss of fat planes with the esophagus. This is concerning for necrotic lymph node with local invasion not excluded. No axillary or hilar adenopathy. Calcified mediastinal and right hilar lymph nodes noted. LUNG, PLEURA, LARGE AIRWAYS: Moderate right pleural effusion. Advanced centrilobular and paraseptal emphysema noted. Biapical pleuroparenchymal scarring. There is a large necrotic cavitary mass in the right mid thorax with osseous destruction of the 5th and 6th ribs with pathologic fracture. This measures approximately 5.1 x 6.6 x 8.3 cm. There is adjacent airspace opacity favored to relate to compressive atelectasis. A 1.8 x 2.7 cm spiculated nodule is noted in the left lung apex. Scattered calcified granulomas noted bilaterally. Several small tracheal diverticulum noted incidentally. CHEST WALL AND LOWER NECK: Cavitary mass in the right mid posterior thorax as described above. BONES: There is a 5.5 x 6.9 x 5.2  cm soft tissue mass in the left scapula with osseous destruction of the scapular body, incompletely imaged. There is diffuse scattered sclerotic lesions throughout the axial and appendicular skeletal system consistent with metastases. Multilevel degenerative changes of the spine.   ABDOMEN:   LIVER: Within normal limits. BILE DUCTS: Normal caliber. GALLBLADDER: No calcified gallstones. No wall thickening. PANCREAS: Within normal limits. SPLEEN: Within normal limits. ADRENALS: There is a 2 cm heterogeneous hypodense nodule in the left adrenal gland concerning for metastases. Right adrenal glands within normal limits. KIDNEYS: Symmetric renal enhancement. No hydronephrosis or perinephric fluid collection. URETERS: No hydroureter.   VESSELS: Calcified and noncalcified atheromatous  plaque is noted in the aortoiliac and mesenteric vessels. There is ectasia of the infrarenal aorta measuring 2.9 x 2.9 cm. RETROPERITONEUM: Within normal limits.   PELVIS:   REPRODUCTIVE ORGANS: Uterus is present. No adnexal mass. BLADDER: Under distended with apparent wall thickening.   BOWEL: Normal caliber. Appendix is not identified with certainty. No pericecal inflammatory changes seen. Moderate stool burden. No pneumatosis or portal venous gas. PERITONEUM: No ascites or free air, no fluid collection. There is mild presacral edema.   ABDOMINAL WALL: Mild body wall edema. BONES: Multilevel degenerative changes of the spine. Diffuse sclerotic lesions in the axial and appendicular skeletal system consistent with osseous metastases.       There is a large necrotic cavitary mass in the right posterior thorax measuring 5.1 x 6.6 x 8.3 cm. There is osseous destruction and pathologic fracture of the posterior 5th and 6th ribs. A 1.8 x 2.7 cm spiculated nodule is noted in the left lung apex. Findings likely relate to patient's provided history of carcinoma. Correlate with prior workup.   Advanced centrilobular and paraseptal emphysema noted. Moderate right pleural effusion with areas of loculation. Adjacent airspace opacity favored to relate to compressive atelectasis. Superimposed infection not excluded. Correlate clinically.   There is a large heterogeneous hypoattenuating subcarinal mass measuring 4.3 x 4.2 cm with significant mass-effect on the esophagus with loss of fat planes with the esophagus. This is concerning for necrotic lymph node with local invasion not excluded.   There is a 2 cm heterogeneous hypodense nodule in the left adrenal gland concerning for metastases.   There is a 5.5 x 6.9 x 5.2 cm soft tissue mass in the left scapula with osseous destruction of the scapular body, incompletely imaged. There is diffuse scattered sclerotic lesions throughout the axial and appendicular skeletal system consistent  with osseous metastases.   Additional findings as described above.     MACRO: None   Signed by: Clau Mcdonald 4/13/2024 11:10 PM Dictation workstation:   [id number]    CT head wo IV contrast    Result Date: 4/13/2024  Interpreted By:  Clau Mcdonald, STUDY: CT HEAD WO IV CONTRAST;  4/13/2024 9:55 pm   INDICATION: Signs/Symptoms:syncope.   COMPARISON: None.   ACCESSION NUMBER(S): [id number]   ORDERING CLINICIAN: YAIR FREEMAN   TECHNIQUE: Axial noncontrast CT images of the head.   FINDINGS: BRAIN PARENCHYMA: Gray-white matter interfaces are preserved. No mass, mass effect or midline shift. Moderate deep and periventricular white matter hypodensities are nonspecific, but favored to represent chronic small vessel ischemic changes.   HEMORRHAGE: No acute intracranial hemorrhage. VENTRICLES and EXTRA-AXIAL SPACES: Moderate volume loss with prominence of the ventricles and sulci. EXTRACRANIAL SOFT TISSUES: Within normal limits. PARANASAL SINUSES/MASTOIDS: Partial opacification of the maxillary sinuses. Remainder of the visualized paranasal sinuses and mastoid air cells are aerated. CALVARIUM: No depressed skull fracture. No destructive osseous lesion.   OTHER FINDINGS: None.       No acute intracranial abnormality.   Chronic changes as noted above.   MACRO: None   Signed by: Clau Mcdonald 4/13/2024 10:46 PM Dictation workstation:   [id number]    XR chest 1 view    Result Date: 3/24/2024  * * *Final Report* * * DATE OF EXAM: Mar 24 2024  1:08PM   CLAUDE   [id number]  -  XR CHEST 1V FRONTAL PORT  / ACCESSION #  [id number] PROCEDURE REASON: Fever      * * * * Physician Interpretation * * * *  EXAMINATION:  CHEST RADIOGRAPH (PORTABLE SINGLE VIEW AP) Exam Date/Time:  3/24/2024 1:08 PM Clinical History: Fever MQ:  [id number] Comparison:  Portable AP CXR 02/17/2024 RESULT: Lines, tubes, and devices:  None. Lungs and pleura:  An irregularly marginated nodule suspicious for neoplasm persists in the medial aspect of the left upper lobe.  A more masslike opacity, also suspicious for neoplasm, overlies the right mid to upper lung. I suspect upper lobe predominant pulmonary emphysematous changes. Multiple calcified granulomata are scattered in both lungs. There has been development of a right basilar airspace opacity most commonly secondary to atelectasis or pneumonia/aspiration. There is stable blunting of the right costophrenic angle secondary to pleural thickening or small effusion. No pneumothorax is identified. Cardiomediastinal silhouette:  Stable cardiomediastinal silhouette. The heart size is within normal limits. There are atherosclerotic calcifications in the aortic arch. The known subcarinal lymph node mass is not well visualized radiographically. Other:  There has been lytic osseous resolution of the posterior aspects of the right fifth and sixth ribs, suspicious for neoplastic involvement.    IMPRESSION: See result. : COLTON   Transcribe Date/Time: Mar 24 2024  1:54P Dictated by : NIYAH ENRIQUEZ MD This examination was interpreted and the report reviewed and electronically signed by: NIYAH ENRIQUEZ MD on Mar 24 2024  1:57PM  EST    Vascular US upper extremity venous duplex left    Result Date: 3/23/2024  * * *Final Report* * * DATE OF EXAM: Mar 22 2024  9:51PM   U   1003  -  US DVT UPPER LT  / ACCESSION #  [id number] PROCEDURE REASON: Arm deep vein thrombosis (DVT), new symptoms      * * * * Physician Interpretation * * * *  EXAMINATION:  LEFT UPPER EXTREMITY DEEP VENOUS ULTRASOUND WITH DOPPLER IMAGING CLINICAL HISTORY: Arm deep vein thrombosis (DVT), new symptoms TECHNIQUE:  Grayscale with compression maneuvers where accessible, color and spectral Doppler of the left internal jugular, subclavian, and axillary veins was performed.  Grayscale with compression maneuvers of the left brachial, basilic and cephalic veins was also performed. The contralateral internal jugular and distal subclavian veins were imaged for comparison.  Images were obtained and stored in a permanent archive. MQ:   USUEL_1 COMPARISON: CT chest 03/16/2024 RESULT: LEFT UPPER EXTREMITY DEEP VEINS Internal Jugular vein: Normal compression, normal spontaneous flow. Subclavian vein:  Normal, spontaneous flow. Axillary vein:  Normal compression, normal spontaneous flow. Brachial vein:  Normal compression SUPERFICIAL VEINS Basilic vein: Normal compression. Cephalic vein:  Normal compression. RIGHT UPPER EXTREMITY (FOR COMPARISON) DEEP VEINS Internal Jugular and Distal Subclavian veins: Normal compression, normal spontaneous flow. Other: Known right supraclavicular lymphadenopathy is better assessed on CT chest 03/16/2024    IMPRESSION: Negative study for DVT in the left upper extremity. Negative study for superficial thrombophlebitis in the imaged segments of the left upper extremity. : PSCB   Transcribe Date/Time: Mar 22 2024 10:14P Dictated by : COBY MURRAY MD This examination was interpreted and the report reviewed and electronically signed by: CARA TIAN MD on Mar 23 2024  7:31AM  EST    IR VASCULAR ACCESS TEAM MIDLINE INSERTION RADIO    Result Date: 3/21/2024  Liam Castro LPN     3/21/2024  3:41 PM MIDLINE INSERTION PROCEDURE NOTE - PICC TEAM NURSES DATE OF PROCEDURE: 3/21/2024 TIME OF PROCEDURE: 1537 ORDERING PHYSICIAN: Nathaniel Chew MD Indications for line placement: Chemotherapy therapy Condition of line placement: Sterile Primary Proceduralist: Letty Lu RN Assistant: Liam Castro LPN Pre-procedure Review: ALLERGIES No Known Allergies Known history of Upper Venous Thrombosis: No Known history of Permanent Pacemaker or Automated Implanted Cardiac Device: No Previous breast surgery of lymph node dissection: No Estimated Glomerular Filtration Rate Date Value Ref Range Status 03/21/2024 69 >=60 mL/min/1.73m² Final   Comment:   Estimated Glomerular Filtration Rate (eGFR) is calculated using the 2021 CKD-EPI creatinine equation.  This equation utilizes serum creatinine, sex, and age as parameters. The creatinine assay has traceable calibration to isotope dilution-mass spectrometry. Refer to KDIGO guidelines for clinical interpretation. In patients with unstable renal function, e.g. those with acute kidney injury, the eGFR may not accurately reflect actual GFR. eGFR- Date Value Ref Range Status 07/05/2023 >60 >60 mL/min/1.73 2 Final   Comment:   MDRD calculation used for eGFR results. History of Renal Disease: No Ultrasound assessment complete: Yes Procedure Narrative Safe Practice: Hand hygiene per hospital policy: Yes Skin preparation used: Chloraprep (CHG + alcohol), allowed to dry Barriers used by Proceduralist and all assisting personnel: Yes UNIVERSAL PROTOCOL / SAFETY CHECKLIST Procedure to be Performed: Midline Sign In: A Moment of CARE was completed. Personnel directly involved with the procedure wore the appropriate PPE (Personal Protective Equipment). Patient/Surrogate Stated/Verified: PATIENT VERIFIED(optional for EMERGENT procedures): Patient name, Date of Birth, Relevant allergies, and The intended procedure Time Out Communication: Intended patient and procedure match the source documents. Consent documented and matches the intended procedure. Sign Out: SIGN OUT (optional for EMERGENT procedures): No specimen collected. Liam Castro LPN Midline Catheter Placement: Brand:  BARD                                        Lot:   [id number] Number of lumens: 1 Type of Midline: Power Injectable Midline Lumen size: 4 [place]orean Placement Technique: Lidocaine: Yes,  Lidocaine 1%  Volume 3 mL Subcutaneous Modified Seldinger Technique use to place line via the: Right Brachial Ultrasound Guidance: Yes Number of attempts at insertion: 1 Ensured control of guidewire during all aspects of the procedure: Yes Accounted for entire guidewire upon removal: Yes Internal length: 8 cm     External length: 0 cm     Trim length:8 cm  Mid-Arm circumference: 24 centimeters Post insertion pain level related to procedure: 0 Action taken to address pain: None needed Verified placement: Positive blood return Line was flushed with 20 mL normal saline Line secured with: Securement device Sterile dressing applied and dated: Yes Sterile caps on all ports prior to leaving procedure area: Yes Specimens: None Complications: None Patient education materials: Given to patient Questions or problems: Page [id number] SIGNATURE: Liam Castro LPN PATIENT NAME: Malathi Rubio DATE: March 21, 2024 MRN: [id number] TIME: 2:54 PM PAGER: [phone]              Malnutrition Diagnosis Status: New  Malnutrition Diagnosis: Severe malnutrition related to chronic disease or condition  As Evidenced by: significant unintentional weight loss of 8.9% x 3 months and 16.7% x 6 months; poor nutritional intake meeting less than 75% of estimated needs for greater than or equal to one month.  I agree with the dietitian's malnutrition diagnosis.      Assessment/Plan   Principal Problem:    Syncope  Active Problems:    Acute pain    Small cell lung cancer (Multi)    Malignant neoplasm metastatic to bone (Multi)    COPD (chronic obstructive pulmonary disease) (Multi)    Acute on chronic anemia    Community acquired bacterial pneumonia    Malnutrition (Multi)    PUD (peptic ulcer disease)    Thrombocytopenia (CMS-HCC)    Mixed hyperlipidemia    #Lung cancer with metastases to bone  #COPD, in acute exacerbation 2/2 pneumonia, bacterial   #Acute hypoxic respiratory failure (improving)   - Patient follows with hem/onc at [place]  - No leukocytosis   - COVID, flu, RSV negative    - Procal 0.43   - BCx negative x 2 days   - Sputum culture if able   - CT chest with large necrotic cavitary mass in the right posterior thorax with osseous destruction; advanced emphysema, airspace opacity favored to represent compressive atelectasis    - Continue Zosyn (day 5)   - Continue Solumedrol 40 mg IVP q8h   -  Home hydromorphone brought in for pain 2/2 metastases; continue hydromorphone scheduled and PRN   - DuoNebs q4h PRN   - Mucinex BID PRN   - Tylenol PRN for fever   - RT eval and treat protocol  - Bronchial hygiene  - Monitor SpO2 continuously   - Baseline O2 None   - Wean O2 as tolerated; patient currently on 2L O2 via NC    - Pulmonology and hematology/oncology consulted, appreciate recs   - Code status discussion completed with patient and spouse 4/17; now DNRCCA      #Hypokalemia (improving)   #Hypomagnesemia (resolved)  - K 3.0 > 2.8 > 3.4 today, repleted per protocol   - Mag 1.78 today   - Telemetry monitoring   - Encourage PO intake as tolerated  - Daily BMP and mag level     #AF RVR (improving)   #Non-MI elevated troponins   - Received Lopressor 5 mg IVP x 1 dose with improvement in HR   - Currently AF rate 100s   - Trop  98 > 92 > 80 > 154 > 75 > 68, patient denies chest pain   - Telemetry monitoring  - Not on anticoagulation due to anemia  - Cardiology consulted, start amiodarone 400 mg PO every day x 2 weeks then 200 mg PO every day      #Syncope (resolved)  #Acute on chronic anemia (improving)   #Thrombocytopenia (improving)   - Patient had syncopal episode at home while on the toilet; has had poor oral intake at home   - CT head with no acute intracranial abnormality  - H/H 11.9/36.3, plts 82 today  - Anticoagulation/DVT ppx held  - Patient received 1 unit pRBCs in the ED and again 4/17   - B12 1232, folate 12.9, iron 22, ferritin 2540, % saturation not calculated  - Continue NS @ 100 ml/hr for suspected dehydration   - Encourage PO intake as tolerated  - Telemetry monitoring  - Daily CBC      #Malnutrition 2/2 active malignancy  - Albumin 2.4   - Regular diet ordered  - Nutrition consulted, appreciate recs     DVT PPx: SCDs   GI PPx: Protonix  Diet: Regular  Code Status: DNRCCA/DNI- discussed with patient and spouse 4/17      Disposition: Patient requires inpatient management at this time.      Total  accumulated time spent face to face and not face to face preparing to see the patient, obtaining and reviewing separately obtained history; performing a medically appropriate examination and/or evaluation; counseling and educating the patient, family; ordering medications, tests, or procedures; referring and communicating with other health care professionals; documenting clinical information in the patient's medical record; independently interpreting results and communicating the results to the patient, family; and care coordination was 45 minutes.      Virgen Burr PA-C

## 2024-04-18 NOTE — PROGRESS NOTES
Subjective:   She feels same as yesterday. Got transfusion and Hb is above 10. Has pain in back. Has had hallucinations yesterday night.     Objective:  Hb has improved.     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 May have bone marrow involvement. Her last chemo was carbo/taxol on 3/21/24. I reviewd CT reports and thoracic mass + mediastinal mass are slightly larger now than prior to chemo. Also, her counts dropped significantly. I dont think it is worth continuing the same chemo. She decided to continue her treatments at our hospital. She understands her prognosis is poor.   We talked about lurbienctedin chemo and she agreed to it. We will have MR brain done and give her IV magnesium daily while in hospital. We will plan to start her on IV chemo next week as outpatient, if her performance status improves. I will see her in the clinic next Wednesday. Patient and her  agreed after discussing risks/benefits/and alternatives.

## 2024-04-18 NOTE — TELEPHONE ENCOUNTER
I saw the patient in the hospital and she agreed to start lurbinectedin for her small cell lung cancer. This encounter is created just to place the chemo orders in the system

## 2024-04-18 NOTE — PROGRESS NOTES
Subjective Data:  No chest pain or dyspnea, thirsty  Episode of transient bradycardia and rapid AF today  Overnight Events:    None     Objective Data:  Last Recorded Vitals:  Vitals:    04/18/24 1100 04/18/24 1200 04/18/24 1311 04/18/24 1600   BP: 111/68 137/79  110/63   BP Location:  Right arm  Right arm   Patient Position:  Lying  Lying   Pulse: 88 105 79 63   Resp: (!) 9 10  20   Temp:  36 °C (96.8 °F)  35.8 °C (96.4 °F)   TempSrc:  Temporal  Temporal   SpO2: 97% 98%  99%   Weight:       Height:           Last Labs:  CBC - 4/18/2024:  5:35 AM  5.5 11.9 82    36.3      CMP - 4/18/2024:  5:35 AM  9.2 4.5 6 --- 0.7   _ 2.4 7 78      PTT - No results in last year.  _   _ _     TROPHS   Date/Time Value Ref Range Status   04/17/2024 10:13 PM 68 0 - 13 ng/L Final   04/16/2024 11:18 AM 75 0 - 13 ng/L Final   04/15/2024 02:01  0 - 13 ng/L Final     BNP   Date/Time Value Ref Range Status   04/13/2024 08:35  0 - 99 pg/mL Final     HGBA1C   Date/Time Value Ref Range Status   03/11/2024 12:03 PM 5.0 4.3 - 5.6 % Final     Comment:     American Diabetes Association guidelines indicate that patients with HgbA1c in the range 5.7-6.4% are at increased risk for development of diabetes, and intervention by lifestyle modification may be beneficial. HgbA1c greater or equal to 6.5% is considered diagnostic of diabetes.   07/06/2023 02:58 PM 6.7 4.3 - 5.6 % Final     Comment:     American Diabetes Association guidelines indicate that patients with HgbA1c in the range 5.7-6.4% are at increased risk for development of diabetes, and intervention by lifestyle modification may be beneficial. HgbA1c greater or equal to 6.5% is considered diagnostic of diabetes.      Last I/O:  I/O last 3 completed shifts:  In: 5439.7 (90.1 mL/kg) [P.O.:1498; I.V.:3194.4 (52.9 mL/kg); Blood:350; IV Piggyback:397.2]  Out: 2045 (33.9 mL/kg) [Urine:2045 (0.9 mL/kg/hr)]  Weight: 60.4 kg     Past Cardiology Tests (Last 3  "Years):  EKG:  Electrocardiogram, 12-lead PRN ACS symptoms 04/18/2024 (Preliminary)      ECG 12 lead 04/16/2024      Electrocardiogram, 12-lead PRN ACS symptoms 04/16/2024 (Preliminary)      ECG 12 lead 04/13/2024    Echo:  No results found for this or any previous visit from the past 1095 days.    Ejection Fractions:  No results found for: \"EF\"  Cath:  No results found for this or any previous visit from the past 1095 days.    Stress Test:  No results found for this or any previous visit from the past 1095 days.    Cardiac Imaging:  No results found for this or any previous visit from the past 1095 days.      Inpatient Medications:  Scheduled medications   Medication Dose Route Frequency    amiodarone  400 mg oral Daily    Followed by    [START ON 5/1/2024] amiodarone  200 mg oral Daily    [Held by provider] enoxaparin  40 mg subcutaneous q24h    gabapentin  300 mg oral BID    HYDROmorphone  16 mg oral q24h CHEIKH    magnesium sulfate  2 g intravenous Daily    methylPREDNISolone sodium succinate (PF)  40 mg intravenous q8h    oxygen   inhalation Continuous - Inhalation    pantoprazole  40 mg oral Daily before breakfast    piperacillin-tazobactam  3.375 g intravenous q6h    polyethylene glycol  17 g oral Daily    potassium chloride CR  40 mEq oral Daily    sennosides-docusate sodium  2 tablet oral BID     PRN medications   Medication    acetaminophen    acetaminophen    alum-mag hydroxide-simeth    benzocaine-menthol    dextromethorphan-guaifenesin    gabapentin    guaiFENesin    HYDROmorphone    ipratropium-albuteroL    metoprolol    ondansetron    ondansetron    sodium chloride 0.9%     Continuous Medications   Medication Dose Last Rate    sodium chloride 0.9%  10 mL/hr      sodium chloride 0.9%  100 mL/hr 100 mL/hr (04/18/24 1815)       Physical Exam:  Constitutional: Well developed, awake/alert/oriented x3, no distress, alert and cooperative  Eyes: PERRL, EOMI, clear sclera  ENMT: mucous membranes moist, no " apparent injury, no lesions seen  Head/Neck: Neck supple, no apparent injury, thyroid without mass or tenderness, No JVD, trachea midline, no bruits  Respiratory/Thorax: Patent airways, CTAB, normal breath sounds with good chest expansion, thorax symmetric  Cardiovascular: Regular, rate and rhythm, no murmurs, 2+ equal pulses of the extremities, normal S 1and S 2  Gastrointestinal: Nondistended, soft, non-tender, no rebound tenderness or guarding, no masses palpable, no organomegaly, +BS, no bruits  Musculoskeletal: ROM intact, no joint swelling, normal strength  Extremities: normal extremities, no cyanosis edema, contusions or wounds, no clubbing  Neurological: alert and oriented x3, intact senses, motor, response and reflexes, normal strength  Lymphatic: No significant lymphadenopathy  Psychological: Appropriate mood and behavior  Skin: Warm and dry, no lesions, no rashes      Assessment/Plan   Syncope  -Most likely vasovagal and acute on chronic anemia  -Hemoglobin 5.9 on admit  -S/p PRBC transfusion  -Platelets low, 68 today  -Also with A-fib with RVR, now sinus rhythm     2.  Paroxysmal atrial fibrillation with RVR  -Converted to sinus rhythm after IV Lopressor, magnesium, potassium  -Not a candidate for anticoagulation due to thrombocytopenia and severe anemia  -Start amiodarone 400 mg daily x 2 weeks then 200 mg daily  -Monitor on telemetry     3.  Elevated troponins-non-MI elevation  -Denies ACS symptoms  -EKG without acute ischemic changes  -Not a candidate for invasive cardiac procedures     4.  COPD exacerbation  -CT reviewed  -Steroids  -Bronchodilators  -Pulmonary consulted  -Bronchial hygiene     5.  Hypokalemia, hypomagnesemia  -Supplement  -Monitor on telemetry     6.  Acute on chronic anemia, thrombocytopenia  -Transfused with PRBCs  -Check iron studies  -Monitor CBC   Peripheral IV 04/16/24 22 G Dorsal;Left Hand (Active)   Site Assessment Clean 04/18/24 0822   Line Status Blood return noted;Flushed  04/18/24 0822   Dressing Status Clean 04/18/24 0822   Number of days: 2       Urethral Catheter Double-lumen 16 Fr. (Active)   Output (mL) 300 mL 04/18/24 1810   Number of days: 4       Code Status:  DNR and No Intubation    I spent 15 minutes in the professional and overall care of this patient.        Juan Quintero MD

## 2024-04-18 NOTE — NURSING NOTE
"0740: Pt yelling out \"help\"while in bed. Went into the room and pt said \"why are you here: Explained she was calling for help. She said \"oh was I?\". Answers questions of orientation correctly, conversation is often confused or needing prompting to stay on track.     0937: Pt was swallowing her potassium (half of a tab in applesauce) when she said it felt \"stuck\" going down. Pt then coughed & lina down to 36 bpm briefly. Heart rate then increased back to 70's.     0943: Pt now in afib. ONOFRE Burr PAC in room to round, aware of change in heart rhythm.     0957: Dr Quintero notified of afib, orders 1x dose of metoprolol.     1000: c/o burning in IV from potassium, rate decreased to 25 ml/hr after verifying IV is functional with blood return & flush.     1311: Converted back to NSR.     1400: Dr Walls in to see pt, discusses increase in tumor size from previous CT scans with pt & spouse.     1530: Rt FA skin tear redressed with mepitel, exufiber & mepilex. Skin tear red, without top surface of skin. Not currently draining.   "

## 2024-04-18 NOTE — CARE PLAN
The patient's goals for the shift include pain relief    The clinical goals for the shift include No increase in O2 need    Over the shift, the patient did not make progress toward the following goals. Barriers to progression include chronic pain & poor appetite related to underlying  cancer. Recommendations to address these barriers include continuing pain medication, encourage supplement intake, food from home if she will eat it.    Problem: Pain  Goal: Takes deep breaths with improved pain control throughout the shift  Outcome: Not Progressing  Goal: Turns in bed with improved pain control throughout the shift  Outcome: Not Progressing  Goal: Walks with improved pain control throughout the shift  Outcome: Not Progressing  Goal: Performs ADL's with improved pain control throughout shift  Outcome: Not Progressing  Goal: Participates in PT with improved pain control throughout the shift  Outcome: Not Progressing     Problem: Nutrition  Goal: Oral intake greater 75%  Outcome: Not Progressing  Goal: Consume prescribed supplement  Outcome: Not Progressing  Goal: Adequate PO fluid intake  Outcome: Not Progressing  Goal: Lab values WNL  Outcome: Not Progressing  Goal: Electrolytes WNL  Outcome: Not Progressing  Goal: Gradual weight gain  Outcome: Not Progressing

## 2024-04-19 ENCOUNTER — APPOINTMENT (OUTPATIENT)
Dept: RADIOLOGY | Facility: HOSPITAL | Age: 69
DRG: 180 | End: 2024-04-19
Payer: MEDICARE

## 2024-04-19 LAB
ANION GAP SERPL CALC-SCNC: 12 MMOL/L (ref 10–20)
BACTERIA BLD CULT: NORMAL
BACTERIA BLD CULT: NORMAL
BASOPHILS # BLD AUTO: 0.02 X10*3/UL (ref 0–0.1)
BASOPHILS NFR BLD AUTO: 0.4 %
BLOOD EXPIRATION DATE: NORMAL
BUN SERPL-MCNC: 22 MG/DL (ref 6–23)
CALCIUM SERPL-MCNC: 9 MG/DL (ref 8.6–10.3)
CHLORIDE SERPL-SCNC: 107 MMOL/L (ref 98–107)
CO2 SERPL-SCNC: 22 MMOL/L (ref 21–32)
CREAT SERPL-MCNC: 0.73 MG/DL (ref 0.5–1.05)
DISPENSE STATUS: NORMAL
EGFRCR SERPLBLD CKD-EPI 2021: 90 ML/MIN/1.73M*2
EOSINOPHIL # BLD AUTO: 0 X10*3/UL (ref 0–0.7)
EOSINOPHIL NFR BLD AUTO: 0 %
ERYTHROCYTE [DISTWIDTH] IN BLOOD BY AUTOMATED COUNT: 19.9 % (ref 11.5–14.5)
GLUCOSE SERPL-MCNC: 141 MG/DL (ref 74–99)
HCT VFR BLD AUTO: 36.3 % (ref 36–46)
HGB BLD-MCNC: 11.6 G/DL (ref 12–16)
IMM GRANULOCYTES # BLD AUTO: 0.14 X10*3/UL (ref 0–0.7)
IMM GRANULOCYTES NFR BLD AUTO: 3.1 % (ref 0–0.9)
LYMPHOCYTES # BLD AUTO: 0.5 X10*3/UL (ref 1.2–4.8)
LYMPHOCYTES NFR BLD AUTO: 11.1 %
MAGNESIUM SERPL-MCNC: 2.2 MG/DL (ref 1.6–2.4)
MCH RBC QN AUTO: 31.2 PG (ref 26–34)
MCHC RBC AUTO-ENTMCNC: 32 G/DL (ref 32–36)
MCV RBC AUTO: 98 FL (ref 80–100)
MONOCYTES # BLD AUTO: 0.29 X10*3/UL (ref 0.1–1)
MONOCYTES NFR BLD AUTO: 6.4 %
NEUTROPHILS # BLD AUTO: 3.55 X10*3/UL (ref 1.2–7.7)
NEUTROPHILS NFR BLD AUTO: 79 %
NRBC BLD-RTO: 0 /100 WBCS (ref 0–0)
PATH REVIEW-CBC DIFFERENTIAL: NORMAL
PLATELET # BLD AUTO: 80 X10*3/UL (ref 150–450)
POTASSIUM SERPL-SCNC: 3.9 MMOL/L (ref 3.5–5.3)
PRODUCT BLOOD TYPE: 6200
PRODUCT CODE: NORMAL
RBC # BLD AUTO: 3.72 X10*6/UL (ref 4–5.2)
SODIUM SERPL-SCNC: 137 MMOL/L (ref 136–145)
UNIT ABO: NORMAL
UNIT NUMBER: NORMAL
UNIT RH: NORMAL
UNIT VOLUME: 350
WBC # BLD AUTO: 4.5 X10*3/UL (ref 4.4–11.3)
XM INTEP: NORMAL

## 2024-04-19 PROCEDURE — 80048 BASIC METABOLIC PNL TOTAL CA: CPT | Mod: IPSPLIT

## 2024-04-19 PROCEDURE — 36415 COLL VENOUS BLD VENIPUNCTURE: CPT | Mod: IPSPLIT

## 2024-04-19 PROCEDURE — 2500000004 HC RX 250 GENERAL PHARMACY W/ HCPCS (ALT 636 FOR OP/ED): Mod: IPSPLIT | Performed by: NURSE PRACTITIONER

## 2024-04-19 PROCEDURE — 99233 SBSQ HOSP IP/OBS HIGH 50: CPT | Performed by: NURSE PRACTITIONER

## 2024-04-19 PROCEDURE — 2500000004 HC RX 250 GENERAL PHARMACY W/ HCPCS (ALT 636 FOR OP/ED): Mod: IPSPLIT | Performed by: INTERNAL MEDICINE

## 2024-04-19 PROCEDURE — 70553 MRI BRAIN STEM W/O & W/DYE: CPT | Performed by: RADIOLOGY

## 2024-04-19 PROCEDURE — 83735 ASSAY OF MAGNESIUM: CPT | Mod: IPSPLIT

## 2024-04-19 PROCEDURE — 1200000002 HC GENERAL ROOM WITH TELEMETRY DAILY: Mod: IPSPLIT

## 2024-04-19 PROCEDURE — 97162 PT EVAL MOD COMPLEX 30 MIN: CPT | Mod: GP,IPSPLIT | Performed by: PHYSICAL THERAPIST

## 2024-04-19 PROCEDURE — A9575 INJ GADOTERATE MEGLUMI 0.1ML: HCPCS | Mod: IPSPLIT | Performed by: INTERNAL MEDICINE

## 2024-04-19 PROCEDURE — 2500000006 HC RX 250 W HCPCS SELF ADMINISTERED DRUGS (ALT 637 FOR ALL PAYERS): Mod: IPSPLIT | Performed by: NURSE PRACTITIONER

## 2024-04-19 PROCEDURE — 70553 MRI BRAIN STEM W/O & W/DYE: CPT | Mod: IPSPLIT

## 2024-04-19 PROCEDURE — 2500000001 HC RX 250 WO HCPCS SELF ADMINISTERED DRUGS (ALT 637 FOR MEDICARE OP): Mod: IPSPLIT | Performed by: NURSE PRACTITIONER

## 2024-04-19 PROCEDURE — 85025 COMPLETE CBC W/AUTO DIFF WBC: CPT | Mod: IPSPLIT

## 2024-04-19 PROCEDURE — 2500000005 HC RX 250 GENERAL PHARMACY W/O HCPCS: Mod: IPSPLIT | Performed by: NURSE PRACTITIONER

## 2024-04-19 PROCEDURE — 2500000004 HC RX 250 GENERAL PHARMACY W/ HCPCS (ALT 636 FOR OP/ED): Mod: IPSPLIT

## 2024-04-19 PROCEDURE — 94668 MNPJ CHEST WALL SBSQ: CPT | Mod: IPSPLIT

## 2024-04-19 PROCEDURE — 2500000001 HC RX 250 WO HCPCS SELF ADMINISTERED DRUGS (ALT 637 FOR MEDICARE OP): Mod: IPSPLIT

## 2024-04-19 PROCEDURE — 2550000001 HC RX 255 CONTRASTS: Mod: IPSPLIT | Performed by: INTERNAL MEDICINE

## 2024-04-19 PROCEDURE — 2500000005 HC RX 250 GENERAL PHARMACY W/O HCPCS: Mod: IPSPLIT | Performed by: INTERNAL MEDICINE

## 2024-04-19 PROCEDURE — 97165 OT EVAL LOW COMPLEX 30 MIN: CPT | Mod: GO,IPSPLIT | Performed by: OCCUPATIONAL THERAPIST

## 2024-04-19 RX ORDER — GADOTERATE MEGLUMINE 376.9 MG/ML
12 INJECTION INTRAVENOUS
Status: COMPLETED | OUTPATIENT
Start: 2024-04-19 | End: 2024-04-19

## 2024-04-19 RX ORDER — DIGOXIN 0.25 MG/ML
500 INJECTION INTRAMUSCULAR; INTRAVENOUS ONCE
Status: COMPLETED | OUTPATIENT
Start: 2024-04-19 | End: 2024-04-19

## 2024-04-19 RX ORDER — AMOXICILLIN AND CLAVULANATE POTASSIUM 875; 125 MG/1; MG/1
1 TABLET, FILM COATED ORAL EVERY 12 HOURS SCHEDULED
Status: DISPENSED | OUTPATIENT
Start: 2024-04-19 | End: 2024-04-22

## 2024-04-19 RX ORDER — METOPROLOL TARTRATE 1 MG/ML
5 INJECTION, SOLUTION INTRAVENOUS 4 TIMES DAILY
Status: DISCONTINUED | OUTPATIENT
Start: 2024-04-19 | End: 2024-04-20

## 2024-04-19 RX ADMIN — GABAPENTIN 300 MG: 300 CAPSULE ORAL at 08:29

## 2024-04-19 RX ADMIN — GABAPENTIN 300 MG: 300 CAPSULE ORAL at 21:48

## 2024-04-19 RX ADMIN — PIPERACILLIN SODIUM AND TAZOBACTAM SODIUM 3.38 G: 3; .375 INJECTION, SOLUTION INTRAVENOUS at 08:29

## 2024-04-19 RX ADMIN — METHYLPREDNISOLONE SODIUM SUCCINATE 40 MG: 40 INJECTION, POWDER, FOR SOLUTION INTRAMUSCULAR; INTRAVENOUS at 10:00

## 2024-04-19 RX ADMIN — METOPROLOL TARTRATE 5 MG: 5 INJECTION INTRAVENOUS at 08:29

## 2024-04-19 RX ADMIN — PIPERACILLIN SODIUM AND TAZOBACTAM SODIUM 3.38 G: 3; .375 INJECTION, SOLUTION INTRAVENOUS at 02:45

## 2024-04-19 RX ADMIN — METHYLPREDNISOLONE SODIUM SUCCINATE 40 MG: 40 INJECTION, POWDER, FOR SOLUTION INTRAMUSCULAR; INTRAVENOUS at 20:19

## 2024-04-19 RX ADMIN — Medication: at 20:00

## 2024-04-19 RX ADMIN — HYDROMORPHONE HYDROCHLORIDE 2 MG: 2 TABLET ORAL at 21:09

## 2024-04-19 RX ADMIN — DIGOXIN 500 MCG: 0.25 INJECTION INTRAMUSCULAR; INTRAVENOUS at 08:29

## 2024-04-19 RX ADMIN — AMIODARONE HYDROCHLORIDE 400 MG: 200 TABLET ORAL at 08:28

## 2024-04-19 RX ADMIN — GADOTERATE MEGLUMINE 12 ML: 376.9 INJECTION INTRAVENOUS at 13:26

## 2024-04-19 RX ADMIN — HYDROMORPHONE HYDROCHLORIDE 2 MG: 2 TABLET ORAL at 17:22

## 2024-04-19 RX ADMIN — METHYLPREDNISOLONE SODIUM SUCCINATE 40 MG: 40 INJECTION, POWDER, FOR SOLUTION INTRAMUSCULAR; INTRAVENOUS at 02:45

## 2024-04-19 RX ADMIN — SENNOSIDES AND DOCUSATE SODIUM 2 TABLET: 8.6; 5 TABLET ORAL at 08:29

## 2024-04-19 RX ADMIN — HYDROMORPHONE HYDROCHLORIDE 2 MG: 2 TABLET ORAL at 01:04

## 2024-04-19 RX ADMIN — HYDROMORPHONE HYDROCHLORIDE 16 MG: 16 TABLET, EXTENDED RELEASE ORAL at 06:20

## 2024-04-19 RX ADMIN — ACETAMINOPHEN 650 MG: 325 TABLET ORAL at 08:28

## 2024-04-19 RX ADMIN — HYDROMORPHONE HYDROCHLORIDE 2 MG: 2 TABLET ORAL at 12:14

## 2024-04-19 RX ADMIN — AMOXICILLIN AND CLAVULANATE POTASSIUM 1 TABLET: 875; 125 TABLET, FILM COATED ORAL at 20:19

## 2024-04-19 ASSESSMENT — PAIN SCALES - PAIN ASSESSMENT IN ADVANCED DEMENTIA (PAINAD)
TOTALSCORE: 7
BODYLANGUAGE: TENSE, DISTRESSED PACING, FIDGETING
CONSOLABILITY: UNABLE TO CONSOLE, DISTRACT OR REASSURE
BREATHING: NORMAL
NEGVOCALIZATION: REPEATED TROUBLED CALLING OUT, LOUD MOANING/GROANING, CRYING
FACIALEXPRESSION: FACIAL GRIMACING

## 2024-04-19 ASSESSMENT — COGNITIVE AND FUNCTIONAL STATUS - GENERAL
MOVING FROM LYING ON BACK TO SITTING ON SIDE OF FLAT BED WITH BEDRAILS: A LITTLE
TURNING FROM BACK TO SIDE WHILE IN FLAT BAD: A LOT
DRESSING REGULAR UPPER BODY CLOTHING: A LOT
DAILY ACTIVITIY SCORE: 13
DRESSING REGULAR LOWER BODY CLOTHING: A LOT
MOVING TO AND FROM BED TO CHAIR: A LOT
CLIMB 3 TO 5 STEPS WITH RAILING: TOTAL
MOVING FROM LYING ON BACK TO SITTING ON SIDE OF FLAT BED WITH BEDRAILS: A LITTLE
TOILETING: TOTAL
DAILY ACTIVITIY SCORE: 13
WALKING IN HOSPITAL ROOM: TOTAL
HELP NEEDED FOR BATHING: A LOT
STANDING UP FROM CHAIR USING ARMS: A LOT
STANDING UP FROM CHAIR USING ARMS: A LOT
MOBILITY SCORE: 11
DRESSING REGULAR UPPER BODY CLOTHING: A LOT
PERSONAL GROOMING: A LITTLE
MOVING TO AND FROM BED TO CHAIR: A LOT
EATING MEALS: A LITTLE
TURNING FROM BACK TO SIDE WHILE IN FLAT BAD: A LOT
DRESSING REGULAR LOWER BODY CLOTHING: A LOT
TOILETING: TOTAL
CLIMB 3 TO 5 STEPS WITH RAILING: TOTAL
EATING MEALS: A LITTLE
WALKING IN HOSPITAL ROOM: TOTAL
MOBILITY SCORE: 11
HELP NEEDED FOR BATHING: A LOT
PERSONAL GROOMING: A LITTLE

## 2024-04-19 ASSESSMENT — PAIN - FUNCTIONAL ASSESSMENT
PAIN_FUNCTIONAL_ASSESSMENT: FLACC (FACE, LEGS, ACTIVITY, CRY, CONSOLABILITY)
PAIN_FUNCTIONAL_ASSESSMENT: 0-10
PAIN_FUNCTIONAL_ASSESSMENT: FLACC (FACE, LEGS, ACTIVITY, CRY, CONSOLABILITY)
PAIN_FUNCTIONAL_ASSESSMENT: 0-10
PAIN_FUNCTIONAL_ASSESSMENT: FLACC (FACE, LEGS, ACTIVITY, CRY, CONSOLABILITY)
PAIN_FUNCTIONAL_ASSESSMENT: 0-10

## 2024-04-19 ASSESSMENT — PAIN SCALES - GENERAL
PAINLEVEL_OUTOF10: 7
PAINLEVEL_OUTOF10: 3
PAINLEVEL_OUTOF10: 0 - NO PAIN
PAINLEVEL_OUTOF10: 7
PAINLEVEL_OUTOF10: 3
PAINLEVEL_OUTOF10: 0 - NO PAIN
PAINLEVEL_OUTOF10: 0 - NO PAIN

## 2024-04-19 ASSESSMENT — PAIN DESCRIPTION - DESCRIPTORS
DESCRIPTORS: ACHING
DESCRIPTORS: PATIENT UNABLE TO DESCRIBE

## 2024-04-19 ASSESSMENT — PAIN SCALES - WONG BAKER
WONGBAKER_NUMERICALRESPONSE: HURTS WHOLE LOT
WONGBAKER_NUMERICALRESPONSE: NO HURT

## 2024-04-19 ASSESSMENT — PAIN DESCRIPTION - LOCATION: LOCATION: GENERALIZED

## 2024-04-19 ASSESSMENT — ACTIVITIES OF DAILY LIVING (ADL)
GROOMING_ASSISTANCE: MINIMAL
ADL_ASSISTANCE: NEEDS ASSISTANCE
TOILETING_ASSISTANCE: MODERATE
BATHING_ASSISTANCE: MODERATE
BATHING_ASSISTANCE: MODERATE
ADL_ASSISTANCE: NEEDS ASSISTANCE

## 2024-04-19 NOTE — NURSING NOTE
Patient requesting to get back in bed, patient educated that staying up in the chair will make her stronger. Patient  agrees and encourages patient to stay in chair.

## 2024-04-19 NOTE — NURSING NOTE
Attempted to medicate patient with her home medication, medication paper form shows night shift nurse removed 0900 medication for pain. Mar did not note medication given at this time. Pharmacy called and Tia WASSERMAN aware. Patient not medicated with home dilaudid 16mg at this time. Per nursing report Liya WASSERMAN stated she gave dilaudid but medication not signed off.

## 2024-04-19 NOTE — NURSING NOTE
Patient returned to unit from mri, patient on the monitor,  at the bedside and call bell in reach.

## 2024-04-19 NOTE — PROGRESS NOTES
Physical Therapy    Physical Therapy Evaluation    Patient Name: Malathi Rubio  MRN: [id number]  Today's Date: 4/19/2024   Time Calculation  Start Time: 0856  Stop Time: 0920  Time Calculation (min): 24 min    Assessment/Plan   PT Assessment  PT Assessment Results: Decreased strength, Decreased endurance, Impaired balance, Impaired judgement, Pain  Rehab Prognosis: Fair  Evaluation/Treatment Tolerance: Patient limited by fatigue  Strengths: Support of Caregivers  Barriers to Participation: Housing layout (discussed getting a ramp with spouse and TCC)  End of Session Communication: Bedside nurse  Assessment Comment: Pleasant 68 y.o presents with weakness. Pt. lives with spouse and has been declining over the past couple months (now requiring more assist). Pt. currently requires Anny x2 for transfers and would benefit from additional PT to address above noted limitations and prevent further decline.  End of Session Patient Position: Up in chair, Alarm on  IP OR SWING BED PT PLAN  Inpatient or Swing Bed: Inpatient  PT Plan  Treatment/Interventions: Bed mobility, Transfer training, Gait training, Stair training, Balance training, Strengthening, Endurance training, Therapeutic exercise, Therapeutic activity, Home exercise program  PT Plan: Skilled PT  PT Frequency: 3 times per week  PT Discharge Recommendations: Moderate intensity level of continued care, 24 hr supervision due to cognition  Equipment Recommended upon Discharge:  (ramp)  PT Recommended Transfer Status: Assist x1  PT - OK to Discharge: Yes Based on completed evaluation and care plan recommendations, no barriers to discharge to next site of care        Subjective   General Visit Information:  General  Reason for Referral: impaired mobility  Referred By: MARGARETTE Quintero  Past Medical History Relevant to Rehab: metastatic CA, HTN, malnutrition, COPD, upper GI bleed, PVD, anemia, gastrtis, TCP, HLD, emphysema  Family/Caregiver Present: Yes  (spouse)  Co-Treatment: OT  Co-Treatment Reason: pts. complex hx  Prior to Session Communication: Bedside nurse  Patient Position Received: Bed, 2 rail up, Alarm off, not on at start of session  Home Living:  Home Living  Type of Home: Mobile home  Lives With: Spouse  Home Adaptive Equipment: Wheelchair-manual, Walker rolling or standard, Other (Comment) (rollator, bedside commode, lift chair)  Home Layout: One level  Home Access: Stairs to enter with rails  Entrance Stairs-Rails: Both  Entrance Stairs-Number of Steps:  (5 total; 3 deep 2 small per report;  assists heavily with stairs)  Bathroom Shower/Tub: Tub/shower unit  Bathroom Toilet: Handicapped height  Bathroom Equipment: Bedside commode, Grab bars around toilet  Prior Level of Function:  Prior Function Per Pt/Caregiver Report  Level of [place]: Needs assistance with ADLs, Needs assistance with homemaking, Needs assistance with functional transfers  Receives Help From:  (spouse)  ADL Assistance: Needs assistance  Bath:  (pt. sponge bathes)  Transfers: Independent (Anny 2-3 mos ago; recently requiring MOD A)  Gait:  (Ravi with WW approx. 2 months ago)  Stairs:  (SBA approx. 2 months)  Prior Function Comments: spouse states she was able to do more for herself approx. 1-2 months ago  Precautions:  Precautions  Medical Precautions: Fall precautions (metastatic CA)  Vital Signs:  Vital Signs  Heart Rate: 99  SpO2: 96 %    Objective   Pain:  Pain Assessment  Pain Assessment: 0-10  Pain Score:  (c/o back pain. Did not rate)  Pain Interventions: Repositioned  Cognition:  Cognition  Overall Cognitive Status: Within Functional Limits  Orientation Level: Oriented X4    General Assessments:     Activity Tolerance  Endurance: Decreased tolerance for upright activites      Strength  Strength Comments: NT formally 2/2 metastatic CA  Coordination  Movements are Fluid and Coordinated: Yes    Dynamic Sitting Balance  Dynamic Sitting-Comments: F+; with B UE  support    Static Standing Balance  Static Standing-Comment/Number of Minutes: F+ with Anny and WW  Dynamic Standing Balance  Dynamic Standing-Comments: F+ with minAx2 and WW  Functional Assessments:  Bed Mobility 1  Bed Mobility 1: Supine to sitting  Level of Assistance 1: Moderate assistance    Transfers  Transfer: Yes  Transfer 1  Technique 1: Sit to stand, Stand to sit  Transfer Device 1: Walker  Transfer Level of Assistance 1:  (Anny x 2)    Ambulation/Gait Training  Ambulation/Gait Training Performed: Yes  Ambulation/Gait Training 1  Device 1: Rolling walker  Assistance 1:  (Anny x 2)  Quality of Gait 1: Decreased step length, Inconsistent stride length  Comments/Distance (ft) 1: 3 steps to chair  Extremity/Trunk Assessments:  Defer to OT for UE detail   RLE   RLE : Within Functional Limits  LLE   LLE : Within Functional Limits  Outcome Measures:  [facility] Basic Mobility  Turning from your back to your side while in a flat bed without using bedrails: A little  Moving from lying on your back to sitting on the side of a flat bed without using bedrails: A lot  Moving to and from bed to chair (including a wheelchair): A lot  Standing up from a chair using your arms (e.g. wheelchair or bedside chair): A lot  To walk in hospital room: Total  Climbing 3-5 steps with railing: Total  Basic Mobility - Total Score: 11         Encounter Problems       Encounter Problems (Active)       Balance       STG - Maintains static standing balance with upper extremity support >=3 min with CGA        Start:  04/19/24    Expected End:  05/03/24            STG - Maintains dynamic sitting balance without upper extremity support with normal balance IND       Start:  04/19/24    Expected End:  05/03/24               Mobility       LTG - Patient will ambulate household distance with CGA and WW       Start:  04/19/24    Expected End:  05/03/24            STG - Patient will ascend and descend four to six stairs with CGA and rail        Start:   04/19/24    Expected End:  05/03/24               PT Transfers       STG - Patient will perform bed mobility Ravi        Start:  04/19/24    Expected End:  05/03/24            STG - Patient will transfer sit to and from stand with CGA and WW       Start:  04/19/24    Expected End:  05/03/24               Pain - Adult              Education Documentation  Mobility Training, taught by Sherin Hassan, PT at 4/19/2024 12:22 PM.  Learner: Family, Patient  Readiness: Acceptance  Method: Explanation  Response: Verbalizes Understanding  Comment: Educated pt. on PT POC    Education Comments  No comments found.

## 2024-04-19 NOTE — CARE PLAN
Problem: Pain  Goal: My pain/discomfort is manageable  Outcome: Progressing     Problem: Safety  Goal: Patient will be injury free during hospitalization  Outcome: Progressing  Goal: I will remain free of falls  Outcome: Progressing     Problem: Daily Care  Goal: Daily care needs are met  Outcome: Progressing     Problem: Psychosocial Needs  Goal: Demonstrates ability to cope with hospitalization/illness  Outcome: Progressing  Goal: Collaborate with me, my family, and caregiver to identify my specific goals  Outcome: Progressing     Problem: Respiratory  Goal: Clear secretions with interventions this shift  Outcome: Progressing  Goal: Minimize anxiety/maximize coping throughout shift  Outcome: Progressing  Goal: Minimal/no exertional discomfort or dyspnea this shift  Outcome: Progressing  Goal: Verbalize decreased shortness of breath this shift  Outcome: Progressing  Goal: Wean oxygen to maintain O2 saturation per order/standard this shift  Outcome: Progressing  Goal: Increase self care and/or family involvement in next 24 hours  Outcome: Progressing  Goal: No signs of respiratory distress (eg. Use of accessory muscles. Peds grunting)  Outcome: Progressing  Goal: Patent airway maintained this shift  Outcome: Progressing     Problem: Pain - Adult  Goal: Verbalizes/displays adequate comfort level or baseline comfort level  Outcome: Progressing     Problem: Safety - Adult  Goal: Free from fall injury  Outcome: Progressing     Problem: Discharge Planning  Goal: Discharge to home or other facility with appropriate resources  Outcome: Progressing     Problem: Chronic Conditions and Co-morbidities  Goal: Patient's chronic conditions and co-morbidity symptoms are monitored and maintained or improved  Outcome: Progressing     Problem: Pain  Goal: Takes deep breaths with improved pain control throughout the shift  Outcome: Progressing  Goal: Turns in bed with improved pain control throughout the shift  Outcome:  Progressing  Goal: Walks with improved pain control throughout the shift  Outcome: Progressing  Goal: Performs ADL's with improved pain control throughout shift  Outcome: Progressing  Goal: Participates in PT with improved pain control throughout the shift  Outcome: Progressing  Goal: Free from opioid side effects throughout the shift  Outcome: Progressing  Goal: Free from acute confusion related to pain meds throughout the shift  Outcome: Progressing     Problem: Nutrition  Goal: Oral intake greater 75%  Outcome: Progressing  Goal: Consume prescribed supplement  Outcome: Progressing  Goal: Adequate PO fluid intake  Outcome: Progressing  Goal: Lab values WNL  Outcome: Progressing  Goal: Electrolytes WNL  Outcome: Progressing  Goal: Gradual weight gain  Outcome: Progressing   The patient's goals for the shift include pain relief    Patient was out of bed for lunch in dinner in the chair. Patient tolerated her mri. Patient and patients  discussing rehab at [facility]. Patient has a poor appetite but did have 2 loose brown stools on toilet. Patients  states that patient has not been in chair or on toilet in 2 weeks.

## 2024-04-19 NOTE — PROGRESS NOTES
Occupational Therapy    Evaluation    Patient Name: Malathi Rubio  MRN: [id number]  Today's Date: 4/19/2024  Time Calculation  Start Time: 0857  Stop Time: 0920  Time Calculation (min): 23 min    Assessment  IP OT Assessment  OT Assessment: Pt. is a 68 y.o. female referred to OT for impaired self-care d/t admisison for syncope and fall. Pt. displays overall deconditioning which has worsened d/t disease and change in medication affecting metal status. Pt.'s appetite has been down and also has been contributing to weakness. Pt. recently needing increased supervision and assist for all ADLs and ambulation. D/t recent decline in pt's function OT rec skilled rehab to improve performance with self-care and mobility. Currently rec 24 hr care.  Prognosis: Fair  Barriers to Discharge: Inaccessible home environment  Evaluation/Treatment Tolerance: Patient limited by fatigue, Patient limited by pain  Medical Staff Made Aware: Yes  End of Session Communication: Bedside nurse  End of Session Patient Position: Up in chair, Alarm on  Plan:  Treatment Interventions: ADL retraining, Functional transfer training, UE strengthening/ROM, Endurance training, Cognitive reorientation, Patient/family training, Neuromuscular reeducation, Compensatory technique education, Equipment evaluation/education  OT Frequency: 3 times per week  OT Discharge Recommendations: Moderate intensity level of continued care, 24 hr supervision due to cognition  OT - OK to Discharge: Yes (Based on completed evaluation and care plan recommendations, no barriers to discharge to next site of care)    Subjective   Current Problem:  1. Syncope, near        2. Primary malignant neoplasm of lung metastatic to other site, unspecified laterality (Multi)        3. Hypoxemia        4. Pneumonia due to infectious organism, unspecified laterality, unspecified part of lung        5. Anemia, unspecified type          General:  General  Reason for Referral: impaired self-care  d/t admission for syncope  Referred By: MARGARETTE Quintero  Past Medical History Relevant to Rehab: metastatic CA, HTN, malnutrition, COPD, upper GI bleed, PVD, anemia, gastrtis, TCP, HLD, emphysema  Family/Caregiver Present: Yes (spouse)  Co-Treatment: PT  Co-Treatment Reason: extensive CA hx; comorbidities  Prior to Session Communication: Bedside nurse  Patient Position Received: Bed, 2 rail up, Alarm off, not on at start of session  Precautions:  Medical Precautions: Fall precautions, Other (comment) (metastatic CA)  Vital Signs:  Heart Rate: 99  Heart Rate Source: Monitor  SpO2: 95 %  Patient Position: Lying  Pain:  Pain Assessment  Pain Assessment: 0-10  Pain Score:  (c/o of back pain; did not rate for therapist)  Pain Interventions: Repositioned    Objective   Cognition:  Overall Cognitive Status: Within Functional Limits  Orientation Level: Oriented X4  Home Living:  Type of Home: Mobile home  Lives With: Spouse  Home Adaptive Equipment: Wheelchair-manual, Walker rolling or standard, Other (Comment) (rollator, bedside commode, lift chair)  Home Layout: One level  Home Access: Stairs to enter with rails  Entrance Stairs-Rails: Both  Entrance Stairs-Number of Steps: 5 total; 3 deep 2 small per report;  assists heavily with stairs  Bathroom Shower/Tub: Tub/shower unit  Bathroom Toilet: Handicapped height  Bathroom Equipment: Bedside commode, Grab bars around toilet   Prior Function:  Level of [place]: Needs assistance with ADLs, Needs assistance with homemaking, Needs assistance with functional transfers  Receives Help From: Family  ADL Assistance: Needs assistance  Bath: Moderate (Pt. sponge bathes)  Toileting: Moderate (spouse assists with pericare)  Dressing: Moderate  Grooming: Minimal  Feeding:  (set up)  Homemaking Assistance: Needs assistance (spouse completes)  Ambulatory Assistance: Needs assistance  Transfers: Independent (MI 2-3 mos ago; recently requiring MOD A)  Gait: Independent (MI  3mos ago)  Stairs: Stand by (3 mos ago)  Prior Function Comments: Spouse reports pt. was SBA for ADLs approximately 3 mos ago. Most recently pt. has required more assistance.  ADL:  Eating Assistance: Stand by  Eating Deficit:  (poor appetite)  Grooming Assistance: Stand by  Bathing Assistance: Moderate  UE Dressing Assistance: Moderate  LE Dressing Assistance: Moderate  Toileting Assistance with Device: Total (beck)  Activity Tolerance:  Endurance: Decreased tolerance for upright activites  Bed Mobility/Transfers: Bed Mobility  Bed Mobility: Yes  Bed Mobility 1  Bed Mobility 1: Supine to sitting  Level of Assistance 1: Moderate assistance    Transfers  Transfer: Yes  Transfer 1  Transfer From 1: Bed to  Transfer to 1: Chair with arms  Technique 1: Sit to stand, Stand to sit  Transfer Device 1: Walker  Transfer Level of Assistance 1: Minimum assistance, +2      Functional Mobility:  Functional Mobility  Functional Mobility Performed: Yes  Functional Mobility 1  Surface 1: Level tile  Device 1: Rolling walker  Functional Mobility Support Devices: Gait belt  Assistance 1: Minimum assistance  Comments 1: short distance to chair, assistance with spouse  Sitting Balance:  Static Sitting Balance  Static Sitting-Balance Support: Right upper extremity supported, Feet supported  Static Sitting-Level of Assistance: Close supervision  Dynamic Sitting Balance  Dynamic Sitting-Balance Support: Feet supported  Dynamic Sitting-Comments: close supervision  Standing Balance:  Static Standing Balance  Static Standing-Balance Support: Bilateral upper extremity supported  Static Standing-Level of Assistance: Minimum assistance  Dynamic Standing Balance  Dynamic Standing-Balance Support: Bilateral upper extremity supported  Dynamic Standing-Balance: Turning  Dynamic Standing-Comments: MIN A  Sensation:  Sensation Comment: denies deficits  Strength:  Strength Comments: Not tested d/t metastatic CA  Coordination:  Movements are Fluid and  Coordinated: Yes   Hand Function:  Hand Function  Gross Grasp: Functional  Coordination: Functional  Extremities: RUE   RUE : Within Functional Limits and LUE   LUE: Exceptions to WFL (impaired shoulder d/t old injury)    Outcome Measures: [facility] Daily Activity  Putting on and taking off regular lower body clothing: A lot  Bathing (including washing, rinsing, drying): A lot  Putting on and taking off regular upper body clothing: A lot  Toileting, which includes using toilet, bedpan or urinal: Total  Taking care of personal grooming such as brushing teeth: A little  Eating Meals: A little  Daily Activity - Total Score: 13      Education Documentation  Body Mechanics, taught by Marsha Waters OT at 4/19/2024 11:15 AM.  Learner: Patient  Readiness: Acceptance  Method: Explanation  Response: Needs Reinforcement  Comment: Edu on POC/DC rec. Edu on hand placement and safety transfers.    ADL Training, taught by Marsha Waters OT at 4/19/2024 11:15 AM.  Learner: Patient  Readiness: Acceptance  Method: Explanation  Response: Needs Reinforcement  Comment: Edu on POC/DC rec. Edu on hand placement and safety transfers.    Education Comments  No comments found.      Goals:   Encounter Problems       Encounter Problems (Active)       ADLs       Patient will perform UB and LB bathing  with stand by assist level of assistance.       Start:  04/19/24    Expected End:  05/03/24            Patient with complete lower body dressing with minimal assist  level of assistance donning and doffing all LE clothes  with PRN adaptive equipment while edge of bed        Start:  04/19/24    Expected End:  05/03/24            Patient will complete toileting including hygiene clothing management/hygiene with minimal assist  level of assistance.       Start:  04/19/24    Expected End:  05/03/24               MOBILITY       Patient will perform Functional mobility min Household distances/Community Distances with stand by assist level of assistance and  least restrictive device in order to improve safety and functional mobility.       Start:  04/19/24    Expected End:  05/03/24               TRANSFERS       Patient will perform bed mobility stand by assist level of assistance and bed rails in order to improve safety and independence with mobility       Start:  04/19/24    Expected End:  05/03/24            Patient will complete sit to stand transfer with stand by assist level of assistance and least restrictive device in order to improve safety and prepare for out of bed mobility.       Start:  04/19/24    Expected End:  05/03/24

## 2024-04-19 NOTE — PROGRESS NOTES
"Malathi Rubio is a 68 y.o. female on day 3 of admission presenting with Syncope.    Subjective     Patient sitting up in a chair after PT assisted her OOB. She complains of back pain frequently.  at bedside. We had a long discussion about treatment going forward and follow up care. She states she is angry at how her cancer was ignored during some attempted trials, they feel more should have been done. I re-directed them to discuss current treatment options versus going home with Hospice. Will continue conversation after MRI resulted. Oncology is following the patient here. Patient denies any dizziness or palpitations. Plan of care discussed with patient and spouse, all questions answered.     Objective     Physical Exam  Constitutional:       General: She is not in acute distress.     Appearance: She is ill-appearing.   HENT:      Head: Normocephalic and atraumatic.      Mouth/Throat:      Mouth: Mucous membranes are moist.   Eyes:      Conjunctiva/sclera: Conjunctivae normal.   Cardiovascular:      Rate and Rhythm: Tachycardia present. Rhythm irregular.      Heart sounds: No murmur heard.  Pulmonary:      Effort: No respiratory distress.      Breath sounds: Rhonchi and rales present. No wheezing.   Abdominal:      General: There is no distension.      Palpations: Abdomen is soft.      Tenderness: There is no abdominal tenderness.   Musculoskeletal:         General: No swelling.   Skin:     General: Skin is warm and dry.      Coloration: Skin is pale.   Neurological:      Mental Status: She is alert and oriented to person, place, and time.   Psychiatric:         Mood and Affect: Mood normal.         Behavior: Behavior normal.     Last Recorded Vitals  Blood pressure 138/66, pulse 56, temperature 37 °C (98.6 °F), temperature source Skin, resp. rate 10, height 1.651 m (5' 5\"), weight 60.4 kg (133 lb 2.5 oz), SpO2 99%.  Intake/Output last 3 Shifts:  I/O last 3 completed shifts:  In: 4458.7 (73.8 mL/kg) " [P.O.:555; I.V.:3203.7 (53 mL/kg); Blood:350; IV Piggyback:350]  Out: 1050 (17.4 mL/kg) [Urine:1050 (0.5 mL/kg/hr)]  Weight: 60.4 kg     Scheduled medications  amiodarone, 400 mg, oral, Daily   Followed by  [START ON 5/1/2024] amiodarone, 200 mg, oral, Daily  [Held by provider] enoxaparin, 40 mg, subcutaneous, q24h  gabapentin, 300 mg, oral, BID  HYDROmorphone, 16 mg, oral, q24h CHEIKH  magnesium sulfate, 2 g, intravenous, Daily  methylPREDNISolone sodium succinate (PF), 40 mg, intravenous, q8h  oxygen, , inhalation, Continuous - Inhalation  pantoprazole, 40 mg, oral, Daily before breakfast  piperacillin-tazobactam, 3.375 g, intravenous, q6h  polyethylene glycol, 17 g, oral, Daily  potassium chloride CR, 40 mEq, oral, Daily  sennosides-docusate sodium, 2 tablet, oral, BID    Continuous medications  sodium chloride 0.9%, 10 mL/hr  sodium chloride 0.9%, 100 mL/hr, Last Rate: 100 mL/hr (04/18/24 1815)    PRN medications  PRN medications: acetaminophen, acetaminophen, alum-mag hydroxide-simeth, benzocaine-menthol, dextromethorphan-guaifenesin, gabapentin, guaiFENesin, HYDROmorphone, ipratropium-albuteroL, metoprolol, ondansetron, ondansetron **OR** [DISCONTINUED] ondansetron, sodium chloride 0.9%    Relevant Results:  Electrocardiogram, 12-lead PRN ACS symptoms  Result Date: 4/17/2024  Atrial fibrillation with rapid ventricular response Nonspecific ST abnormality    ECG 12 lead  Result Date: 4/16/2024  Sinus tachycardia with occasional Premature ventricular complexes Nonspecific ST and T wave abnormality     ECG 12 lead  Result Date: 4/16/2024  Sinus tachycardia Otherwise normal ECG When compared with ECG of 13-APR-2024 19:59, (unconfirmed) Premature ventricular complexes are no longer Present Nonspecific T wave abnormality now evident in Inferior leads Nonspecific T wave abnormality no longer evident in Lateral leads See ED provider note for full interpretation and clinical correlation Confirmed by Denisa Looney  ([id number]) on 4/16/2024 2:26:29 PM    CT chest abdomen pelvis w IV contrast  Result Date: 4/13/2024  There is a large necrotic cavitary mass in the right posterior thorax measuring 5.1 x 6.6 x 8.3 cm. There is osseous destruction and pathologic fracture of the posterior 5th and 6th ribs. A 1.8 x 2.7 cm spiculated nodule is noted in the left lung apex. Findings likely relate to patient's provided history of carcinoma. Correlate with prior workup.   Advanced centrilobular and paraseptal emphysema noted. Moderate right pleural effusion with areas of loculation. Adjacent airspace opacity favored to relate to compressive atelectasis. Superimposed infection not excluded. Correlate clinically.   There is a large heterogeneous hypoattenuating subcarinal mass measuring 4.3 x 4.2 cm with significant mass-effect on the esophagus with loss of fat planes with the esophagus. This is concerning for necrotic lymph node with local invasion not excluded.   There is a 2 cm heterogeneous hypodense nodule in the left adrenal gland concerning for metastases.   There is a 5.5 x 6.9 x 5.2 cm soft tissue mass in the left scapula with osseous destruction of the scapular body, incompletely imaged. There is diffuse scattered sclerotic lesions throughout the axial and appendicular skeletal system consistent with osseous metastases.   Additional findings as described above.     MACRO: None   Signed by: Clau Mcdonald 4/13/2024 11:10 PM Dictation workstation:   [id number]    CT head wo IV contrast  Result Date: 4/13/2024  No acute intracranial abnormality.   Chronic changes as noted above.   MACRO: None   Signed by: Clau Mcdonald 4/13/2024 10:46 PM Dictation workstation:   [id number]     Latest Reference Range & Units 04/17/24 05:13 04/17/24 22:13 04/18/24 05:35 04/19/24 05:03   GLUCOSE 74 - 99 mg/dL 99  163 (H) 141 (H)   SODIUM 136 - 145 mmol/L 140  138 137   POTASSIUM 3.5 - 5.3 mmol/L 2.8 (LL) 3.9 3.4 (L) 3.9   CHLORIDE 98 - 107 mmol/L 104  104  107   Bicarbonate 21 - 32 mmol/L 28  20 (L) 22   Anion Gap 10 - 20 mmol/L 11  17 12   Blood Urea Nitrogen 6 - 23 mg/dL 8  17 22   Creatinine 0.50 - 1.05 mg/dL 0.72  0.77 0.73   EGFR >60 mL/min/1.73m*2 >90  85 90   Calcium 8.6 - 10.3 mg/dL 8.9  9.2 9.0      Latest Reference Range & Units 04/17/24 05:13 04/18/24 05:35   FERRITIN 8 - 150 ng/mL 1,541 (H) 2,540 (H)      Latest Reference Range & Units 04/17/24 05:13 04/17/24 22:13 04/18/24 05:35 04/19/24 05:03   LEUKOCYTES (10*3/UL) IN BLOOD BY AUTOMATED COUNT, [place] 4.4 - 11.3 x10*3/uL 5.6 4.6 5.5 4.5   nRBC 0.0 - 0.0 /100 WBCs 0.0 0.0 0.0 0.0   ERYTHROCYTES (10*6/UL) IN BLOOD BY AUTOMATED COUNT, [place] 4.00 - 5.20 x10*6/uL 2.38 (L) 3.44 (L) 3.82 (L) 3.72 (L)   HEMOGLOBIN 12.0 - 16.0 g/dL 7.3 (L) 10.8 (L) 11.9 (L) 11.6 (L)   HEMATOCRIT 36.0 - 46.0 % 22.9 (L) 33.2 (L) 36.3 36.3   MCV 80 - 100 fL 96 97 95 98   MCH 26.0 - 34.0 pg 30.7 31.4 31.2 31.2   MCHC 32.0 - 36.0 g/dL 31.9 (L) 32.5 32.8 32.0   RED CELL DISTRIBUTION WIDTH 11.5 - 14.5 % 22.8 (H) 19.9 (H) 20.0 (H) 19.9 (H)   PLATELETS (10*3/UL) IN BLOOD AUTOMATED COUNT, [place] 150 - 450 x10*3/uL 68 (L) 72 (L) 82 (L) 80 (L)     Assessment/Plan   Principal Problem:    Syncope  Active Problems:    Acute pain    Small cell lung cancer (Multi)    Malignant neoplasm metastatic to bone (Multi)    COPD (chronic obstructive pulmonary disease) (Multi)    Acute on chronic anemia    Community acquired bacterial pneumonia    Malnutrition (Multi)    PUD (peptic ulcer disease)    Thrombocytopenia (CMS-HCC)    Mixed hyperlipidemia    #Lung cancer with metastases to bone  #COPD, in acute exacerbation 2/2 pneumonia, bacterial   #Acute hypoxic respiratory failure (improving)   - Patient follows with hem/onc at [place]  - No leukocytosis   - COVID, flu, RSV negative    - Procal 0.43   - BCx negative x 3 days   - Sputum culture if able   - CT chest with large necrotic cavitary mass in the right posterior thorax with osseous  destruction; advanced emphysema, airspace opacity favored to represent compressive atelectasis    - Continue Zosyn (day 6), transition to augmentin today  - Continue Solumedrol 40 mg IVP q12h   - Home hydromorphone brought in for pain 2/2 metastases; continue hydromorphone scheduled and PRN   - DuoNebs q4h PRN   - Mucinex BID PRN   - Tylenol PRN for fever   - RT eval and treat protocol  - Bronchial hygiene  - Monitor SpO2 continuously   - Baseline O2 None   - Wean O2 as tolerated; patient currently on 2L O2 via NC    - Pulmonology and hematology/oncology consulted, appreciate recs   - Code status discussion completed with patient and spouse 4/17; now DNRCCA      #Hypokalemia (improving)   #Hypomagnesemia (resolved)  - K 3.0 > 2.8 > 3.4 > 3.9   - Mag 1.78 > 2.20  - Telemetry monitoring   - Encourage PO intake as tolerated  - Daily BMP and mag level  - replete as needed     #AF RVR (improving)   #Non-MI elevated troponins   - Received Lopressor 5 mg IVP x 1 dose with improvement in HR   - Currently AF rate 100s   - Trop  98 > 92 > 80 > 154 > 75 > 68, patient denies chest pain   - Telemetry monitoring  - Not on anticoagulation due to anemia  - Cardiology consulted, start amiodarone 400 mg PO every day x 2 weeks then 200 mg PO every day   - PRN metoprolol as needed for HR control     #Syncope (resolved)  #Acute on chronic anemia (improving)   #Thrombocytopenia (improving)   - Patient had syncopal episode at home while on the toilet; has had poor oral intake at home   - CT head with no acute intracranial abnormality  - H/H 11.9/36.3 > 11.6/36.3  - plts 82 > 80  - Anticoagulation/DVT ppx held  - Patient received 1 unit pRBCs in the ED and again 4/17   - B12 1232, folate 12.9, iron 22, ferritin 2540, % saturation not calculated  - Encourage PO intake as tolerated  - Telemetry monitoring  - Daily CBC      #Malnutrition 2/2 active malignancy  - Albumin 2.4   - Regular diet ordered  - Nutrition consulted, appreciate  recs  -Malnutrition Diagnosis Status: New  Malnutrition Diagnosis: Severe malnutrition related to chronic disease or condition  As Evidenced by: significant unintentional weight loss of 8.9% x 3 months and 16.7% x 6 months; poor nutritional intake meeting less than 75% of estimated needs for greater than or equal to one month.  I agree with the dietitian's malnutrition diagnosis.     DVT PPx: SCDs   GI PPx: Protonix  Diet: Regular  Code Status: DNRCCA/DNI- discussed with patient and spouse 4/17      Disposition: Patient requires inpatient management at this time.    Total accumulated time spent face to face and not face to face preparing to see the patient, obtaining and reviewing separately obtained history; performing a medically appropriate examination and/or evaluation; counseling and educating the patient, family; ordering medications, tests, or procedures; referring and communicating with other health care professionals; documenting clinical information in the patient's medical record; independently interpreting results and communicating the results to the patient, family; and care coordination was 35 minutes.      Amrita Pinon, LUIS-CNP

## 2024-04-19 NOTE — NURSING NOTE
Patient in room crying in pain, patient states pain all over. Patient is not due for pain medications at this time. Patient requesting Neurontin, patient medicated as ordered.

## 2024-04-19 NOTE — PROGRESS NOTES
11:50 AM  Per IDT rounds,  Medications & fluids are being adjusted. Patient not medically cleared.  Patient informed of recommendation for MOD level of therapy/Skilled Nursing Facility upon discharge.  Printed insurance choice list for patient.  Will follow up after patient and spouse review.      12:30 pm  Patient interested in [facility].  Called [facility] and admission person will stop by and talk with patient/spouse.  Requests three rehabs.  Information for ramp building and 211 resources given to spouse.      2:10 pm  [facility] is reviewing referral.  Patient has agreed to not smoke at facility.  Patient will need precert authorization which has not been started 2/2 patient is not medically ready.   [facility] following.      3:20 pm  Per NP, initiate precert- [facility] aware.  There is no authorization at this time so patient cannot be discharged.  Medical team can follow up with [facility] covering weekend.

## 2024-04-19 NOTE — NURSING NOTE
Patient encouraged to get up to chair for dinner, patient states pain. Patient up to the chair and medicated for pain. Patient thought that it was morning. Patient then ate half of cocochalate pudding. Patient then put back in bed and encouraged to lay on her side instead of her back. Patient now resting on her left side with multiple pillows and call bell in reach. Patient resting at this time with  at bedside.

## 2024-04-19 NOTE — PROGRESS NOTES
Subjective Data:  Sleeping, no acute distress    PAF, now RVR this AM     Overnight Events:    None     Objective Data:  Last Recorded Vitals:  Vitals:    04/19/24 0857 04/19/24 0900 04/19/24 0942 04/19/24 1100   BP:    153/77   BP Location:    Right arm   Patient Position:    Lying   Pulse: 99 (!) 113  71   Resp:  19  14   Temp:    36.4 °C (97.5 °F)   TempSrc:    Temporal   SpO2: 95% 97% 95% 97%   Weight:       Height:           Last Labs:  CBC - 4/19/2024:  5:03 AM  4.5 11.6 80    36.3      CMP - 4/19/2024:  5:03 AM  9.0 4.5 6 --- 0.7   _ 2.4 7 78      PTT - No results in last year.  _   _ _     TROPHS   Date/Time Value Ref Range Status   04/17/2024 10:13 PM 68 0 - 13 ng/L Final   04/16/2024 11:18 AM 75 0 - 13 ng/L Final   04/15/2024 02:01  0 - 13 ng/L Final     BNP   Date/Time Value Ref Range Status   04/13/2024 08:35  0 - 99 pg/mL Final     HGBA1C   Date/Time Value Ref Range Status   03/11/2024 12:03 PM 5.0 4.3 - 5.6 % Final     Comment:     American Diabetes Association guidelines indicate that patients with HgbA1c in the range 5.7-6.4% are at increased risk for development of diabetes, and intervention by lifestyle modification may be beneficial. HgbA1c greater or equal to 6.5% is considered diagnostic of diabetes.   07/06/2023 02:58 PM 6.7 4.3 - 5.6 % Final     Comment:     American Diabetes Association guidelines indicate that patients with HgbA1c in the range 5.7-6.4% are at increased risk for development of diabetes, and intervention by lifestyle modification may be beneficial. HgbA1c greater or equal to 6.5% is considered diagnostic of diabetes.      Last I/O:  I/O last 3 completed shifts:  In: 4458.7 (73.8 mL/kg) [P.O.:555; I.V.:3203.7 (53 mL/kg); Blood:350; IV Piggyback:350]  Out: 1050 (17.4 mL/kg) [Urine:1050 (0.5 mL/kg/hr)]  Weight: 60.4 kg     Inpatient Medications:  Scheduled medications   Medication Dose Route Frequency    amiodarone  400 mg oral Daily    Followed by    [START ON  5/1/2024] amiodarone  200 mg oral Daily    amoxicillin-pot clavulanate  1 tablet oral q12h CHEIKH    [Held by provider] enoxaparin  40 mg subcutaneous q24h    gabapentin  300 mg oral BID    HYDROmorphone  16 mg oral q24h CHEIKH    magnesium sulfate  2 g intravenous Daily    methylPREDNISolone sodium succinate (PF)  40 mg intravenous q12h    metoprolol  5 mg intravenous 4x daily    oxygen   inhalation Continuous - Inhalation    pantoprazole  40 mg oral Daily before breakfast    polyethylene glycol  17 g oral Daily    potassium chloride CR  40 mEq oral Daily    sennosides-docusate sodium  2 tablet oral BID     PRN medications   Medication    acetaminophen    acetaminophen    alum-mag hydroxide-simeth    benzocaine-menthol    dextromethorphan-guaifenesin    gabapentin    guaiFENesin    HYDROmorphone    ipratropium-albuteroL    metoprolol    ondansetron    ondansetron    sodium chloride 0.9%     Continuous Medications   Medication Dose Last Rate    sodium chloride 0.9%  10 mL/hr         Physical Exam:  Constitutional: Well developed,no distress,   Eyes: PERRL, EOMI, clear sclera  ENMT: mucous membranes moist, no apparent injury, no lesions seen  Head/Neck: Neck supple, no apparent injury, thyroid without mass or tenderness, No JVD, trachea midline, no bruits  Respiratory/Thorax: Patent airways, CTAB, normal breath sounds with good chest expansion, thorax symmetric  Cardiovascular: Regular, rate and rhythm, no murmurs, 2+ equal pulses of the extremities, normal S 1and S 2  Gastrointestinal: Nondistended, soft, non-tender, no rebound tenderness or guarding, no masses palpable, no organomegaly, +BS, no bruits  Musculoskeletal: ROM intact, no joint swelling, normal strength  Extremities: normal extremities, no cyanosis edema, contusions or wounds, no clubbing  Neurological: alert and oriented x3, intact senses, motor, response and reflexes, normal strength  Lymphatic: No significant lymphadenopathy  Psychological: Appropriate  mood and behavior  Skin: Warm and dry, no lesions, no rashes      Assessment/Plan     Syncope  -Most likely vasovagal and acute on chronic anemia  -Hemoglobin 5.9 on admit  -S/p PRBC transfusion  -Platelets low, 68 today  -Also with A-fib with RVR, now sinus rhythm with AF paroxysms      2.  Paroxysmal atrial fibrillation with RVR, intermittent breakthrough episodes   -Converted to sinus rhythm after IV Lopressor, magnesium, potassium  -Not a candidate for anticoagulation due to thrombocytopenia and severe anemia  -Start amiodarone 400 mg daily x 2 weeks then 200 mg daily  -Monitor on telemetry  -IV metoprolol 5 mg every 6 hrs, 0.5 mg digoxin IV x1 today for rate control     3.  Elevated troponins-non-MI elevation  -Denies ACS symptoms  -EKG without acute ischemic changes  -Not a candidate for invasive cardiac procedures     4.  COPD exacerbation  -CT reviewed  -Steroids  -Bronchodilators  -Pulmonary consulted  -Bronchial hygiene     5.  Hypokalemia, hypomagnesemia  -Supplement  -Monitor on telemetry     6.  Acute on chronic anemia, thrombocytopenia  -Transfused with PRBCs  -Monitor CBC     Code Status:  DNR and No Intubation    I spent 15 minutes in the professional and overall care of this patient.        Juan Quintero MD

## 2024-04-19 NOTE — CARE PLAN
Problem: Pain  Goal: My pain/discomfort is manageable  Outcome: Progressing     Problem: Safety  Goal: Patient will be injury free during hospitalization  Outcome: Progressing  Goal: I will remain free of falls  Outcome: Progressing     Problem: Daily Care  Goal: Daily care needs are met  Outcome: Progressing     Problem: Psychosocial Needs  Goal: Demonstrates ability to cope with hospitalization/illness  Outcome: Progressing  Goal: Collaborate with me, my family, and caregiver to identify my specific goals  Outcome: Progressing     Problem: Respiratory  Goal: Clear secretions with interventions this shift  Outcome: Progressing  Goal: Minimize anxiety/maximize coping throughout shift  Outcome: Progressing  Goal: Minimal/no exertional discomfort or dyspnea this shift  Outcome: Progressing  Goal: Verbalize decreased shortness of breath this shift  Outcome: Progressing  Goal: Wean oxygen to maintain O2 saturation per order/standard this shift  Outcome: Progressing  Goal: Increase self care and/or family involvement in next 24 hours  Outcome: Progressing  Goal: No signs of respiratory distress (eg. Use of accessory muscles. Peds grunting)  Outcome: Progressing  Goal: Patent airway maintained this shift  Outcome: Progressing     Problem: Pain - Adult  Goal: Verbalizes/displays adequate comfort level or baseline comfort level  Outcome: Progressing     Problem: Safety - Adult  Goal: Free from fall injury  Outcome: Progressing     Problem: Discharge Planning  Goal: Discharge to home or other facility with appropriate resources  Outcome: Progressing     Problem: Chronic Conditions and Co-morbidities  Goal: Patient's chronic conditions and co-morbidity symptoms are monitored and maintained or improved  Outcome: Progressing     Problem: Pain  Goal: Takes deep breaths with improved pain control throughout the shift  Outcome: Progressing  Goal: Turns in bed with improved pain control throughout the shift  Outcome:  "Progressing  Goal: Walks with improved pain control throughout the shift  Outcome: Progressing  Goal: Performs ADL's with improved pain control throughout shift  Outcome: Progressing  Goal: Participates in PT with improved pain control throughout the shift  Outcome: Progressing  Goal: Free from opioid side effects throughout the shift  Outcome: Progressing  Goal: Free from acute confusion related to pain meds throughout the shift  Outcome: Progressing     Problem: Nutrition  Goal: Oral intake greater 75%  Outcome: Progressing  Goal: Consume prescribed supplement  Outcome: Progressing  Goal: Adequate PO fluid intake  Outcome: Progressing  Goal: Lab values WNL  Outcome: Progressing  Goal: Electrolytes WNL  Outcome: Progressing  Goal: Gradual weight gain  Outcome: Progressing   The patient's goals for the shift include pain relief    The clinical goals for the shift include Patient will tolerate MRI this shift    Patient had a good shift, patient was up to the chair for lunch and dinner. Patient also tolerated her MRI. Saline fluids discontinued. Patient states she has not been out of bed \"this much in weeks\". Patient encouraged to rest after being out of bed multiple times this shift     "

## 2024-04-19 NOTE — NURSING NOTE
Pt woke up from sleeping moaning in pain. Medicated per Mar with dilaudid (not patients own dilaudid in omni which is scheduled)

## 2024-04-19 NOTE — NURSING NOTE
Patient returned back to bed, 2 assist. Patient readjusted per comfort. Call bell in reach.  at the bedside.

## 2024-04-20 LAB
ANION GAP SERPL CALC-SCNC: 11 MMOL/L (ref 10–20)
BASOPHILS # BLD AUTO: 0.02 X10*3/UL (ref 0–0.1)
BASOPHILS NFR BLD AUTO: 0.4 %
BUN SERPL-MCNC: 26 MG/DL (ref 6–23)
CALCIUM SERPL-MCNC: 8.9 MG/DL (ref 8.6–10.3)
CHLORIDE SERPL-SCNC: 109 MMOL/L (ref 98–107)
CO2 SERPL-SCNC: 20 MMOL/L (ref 21–32)
CREAT SERPL-MCNC: 0.7 MG/DL (ref 0.5–1.05)
EGFRCR SERPLBLD CKD-EPI 2021: >90 ML/MIN/1.73M*2
EOSINOPHIL # BLD AUTO: 0 X10*3/UL (ref 0–0.7)
EOSINOPHIL NFR BLD AUTO: 0 %
ERYTHROCYTE [DISTWIDTH] IN BLOOD BY AUTOMATED COUNT: 19.6 % (ref 11.5–14.5)
GLUCOSE SERPL-MCNC: 128 MG/DL (ref 74–99)
HAPTOGLOB SERPL-MCNC: 138 MG/DL (ref 37–246)
HCT VFR BLD AUTO: 34.4 % (ref 36–46)
HGB BLD-MCNC: 10.7 G/DL (ref 12–16)
IMM GRANULOCYTES # BLD AUTO: 0.09 X10*3/UL (ref 0–0.7)
IMM GRANULOCYTES NFR BLD AUTO: 1.8 % (ref 0–0.9)
LYMPHOCYTES # BLD AUTO: 0.4 X10*3/UL (ref 1.2–4.8)
LYMPHOCYTES NFR BLD AUTO: 7.8 %
MAGNESIUM SERPL-MCNC: 2.2 MG/DL (ref 1.6–2.4)
MCH RBC QN AUTO: 31.6 PG (ref 26–34)
MCHC RBC AUTO-ENTMCNC: 31.1 G/DL (ref 32–36)
MCV RBC AUTO: 102 FL (ref 80–100)
MONOCYTES # BLD AUTO: 0.33 X10*3/UL (ref 0.1–1)
MONOCYTES NFR BLD AUTO: 6.5 %
NEUTROPHILS # BLD AUTO: 4.26 X10*3/UL (ref 1.2–7.7)
NEUTROPHILS NFR BLD AUTO: 83.5 %
NRBC BLD-RTO: 0 /100 WBCS (ref 0–0)
PLATELET # BLD AUTO: 49 X10*3/UL (ref 150–450)
POTASSIUM SERPL-SCNC: 3.7 MMOL/L (ref 3.5–5.3)
RBC # BLD AUTO: 3.39 X10*6/UL (ref 4–5.2)
SODIUM SERPL-SCNC: 136 MMOL/L (ref 136–145)
WBC # BLD AUTO: 5.1 X10*3/UL (ref 4.4–11.3)

## 2024-04-20 PROCEDURE — 2500000004 HC RX 250 GENERAL PHARMACY W/ HCPCS (ALT 636 FOR OP/ED): Mod: IPSPLIT

## 2024-04-20 PROCEDURE — 2500000001 HC RX 250 WO HCPCS SELF ADMINISTERED DRUGS (ALT 637 FOR MEDICARE OP): Mod: IPSPLIT | Performed by: NURSE PRACTITIONER

## 2024-04-20 PROCEDURE — 99233 SBSQ HOSP IP/OBS HIGH 50: CPT | Performed by: NURSE PRACTITIONER

## 2024-04-20 PROCEDURE — 2500000001 HC RX 250 WO HCPCS SELF ADMINISTERED DRUGS (ALT 637 FOR MEDICARE OP): Mod: IPSPLIT

## 2024-04-20 PROCEDURE — 94668 MNPJ CHEST WALL SBSQ: CPT | Mod: IPSPLIT

## 2024-04-20 PROCEDURE — 2500000006 HC RX 250 W HCPCS SELF ADMINISTERED DRUGS (ALT 637 FOR ALL PAYERS): Mod: IPSPLIT | Performed by: NURSE PRACTITIONER

## 2024-04-20 PROCEDURE — 82374 ASSAY BLOOD CARBON DIOXIDE: CPT | Mod: IPSPLIT

## 2024-04-20 PROCEDURE — 1200000002 HC GENERAL ROOM WITH TELEMETRY DAILY: Mod: IPSPLIT

## 2024-04-20 PROCEDURE — 36415 COLL VENOUS BLD VENIPUNCTURE: CPT | Mod: IPSPLIT

## 2024-04-20 PROCEDURE — 2500000004 HC RX 250 GENERAL PHARMACY W/ HCPCS (ALT 636 FOR OP/ED): Mod: IPSPLIT | Performed by: NURSE PRACTITIONER

## 2024-04-20 PROCEDURE — 85025 COMPLETE CBC W/AUTO DIFF WBC: CPT | Mod: IPSPLIT

## 2024-04-20 PROCEDURE — 83735 ASSAY OF MAGNESIUM: CPT | Mod: IPSPLIT

## 2024-04-20 RX ORDER — PREDNISONE 20 MG/1
40 TABLET ORAL DAILY
Status: DISCONTINUED | OUTPATIENT
Start: 2024-04-21 | End: 2024-04-25 | Stop reason: HOSPADM

## 2024-04-20 RX ADMIN — POTASSIUM CHLORIDE 40 MEQ: 1500 TABLET, EXTENDED RELEASE ORAL at 08:28

## 2024-04-20 RX ADMIN — GABAPENTIN 300 MG: 300 CAPSULE ORAL at 20:14

## 2024-04-20 RX ADMIN — POLYETHYLENE GLYCOL 3350 17 G: 17 POWDER, FOR SOLUTION ORAL at 08:27

## 2024-04-20 RX ADMIN — ACETAMINOPHEN 650 MG: 325 TABLET ORAL at 20:41

## 2024-04-20 RX ADMIN — MAGNESIUM SULFATE IN WATER 2 G: 40 INJECTION, SOLUTION INTRAVENOUS at 08:28

## 2024-04-20 RX ADMIN — HYDROMORPHONE HYDROCHLORIDE 2 MG: 2 TABLET ORAL at 12:01

## 2024-04-20 RX ADMIN — AMOXICILLIN AND CLAVULANATE POTASSIUM 1 TABLET: 875; 125 TABLET, FILM COATED ORAL at 20:14

## 2024-04-20 RX ADMIN — METHYLPREDNISOLONE SODIUM SUCCINATE 40 MG: 40 INJECTION, POWDER, FOR SOLUTION INTRAMUSCULAR; INTRAVENOUS at 08:28

## 2024-04-20 RX ADMIN — GABAPENTIN 300 MG: 300 CAPSULE ORAL at 08:27

## 2024-04-20 RX ADMIN — AMIODARONE HYDROCHLORIDE 400 MG: 200 TABLET ORAL at 08:28

## 2024-04-20 RX ADMIN — PANTOPRAZOLE SODIUM 40 MG: 40 TABLET, DELAYED RELEASE ORAL at 06:00

## 2024-04-20 RX ADMIN — HYDROMORPHONE HYDROCHLORIDE 2 MG: 2 TABLET ORAL at 16:03

## 2024-04-20 RX ADMIN — HYDROMORPHONE HYDROCHLORIDE 2 MG: 2 TABLET ORAL at 02:39

## 2024-04-20 RX ADMIN — SENNOSIDES AND DOCUSATE SODIUM 2 TABLET: 8.6; 5 TABLET ORAL at 08:28

## 2024-04-20 RX ADMIN — HYDROMORPHONE HYDROCHLORIDE 2 MG: 2 TABLET ORAL at 06:50

## 2024-04-20 RX ADMIN — HYDROMORPHONE HYDROCHLORIDE 16 MG: 16 TABLET, EXTENDED RELEASE ORAL at 09:00

## 2024-04-20 RX ADMIN — HYDROMORPHONE HYDROCHLORIDE 2 MG: 2 TABLET ORAL at 20:14

## 2024-04-20 RX ADMIN — AMOXICILLIN AND CLAVULANATE POTASSIUM 1 TABLET: 875; 125 TABLET, FILM COATED ORAL at 08:28

## 2024-04-20 ASSESSMENT — COGNITIVE AND FUNCTIONAL STATUS - GENERAL
DRESSING REGULAR LOWER BODY CLOTHING: A LOT
HELP NEEDED FOR BATHING: A LOT
DAILY ACTIVITIY SCORE: 12
TOILETING: TOTAL
MOBILITY SCORE: 11
DRESSING REGULAR UPPER BODY CLOTHING: A LOT
PERSONAL GROOMING: A LOT
TURNING FROM BACK TO SIDE WHILE IN FLAT BAD: A LOT
WALKING IN HOSPITAL ROOM: TOTAL
MOVING TO AND FROM BED TO CHAIR: A LOT
STANDING UP FROM CHAIR USING ARMS: A LOT
EATING MEALS: A LITTLE
CLIMB 3 TO 5 STEPS WITH RAILING: TOTAL
MOVING FROM LYING ON BACK TO SITTING ON SIDE OF FLAT BED WITH BEDRAILS: A LITTLE

## 2024-04-20 ASSESSMENT — PAIN DESCRIPTION - LOCATION
LOCATION: ABDOMEN

## 2024-04-20 ASSESSMENT — PAIN DESCRIPTION - DESCRIPTORS
DESCRIPTORS: ACHING
DESCRIPTORS: ACHING

## 2024-04-20 ASSESSMENT — PAIN SCALES - GENERAL
PAINLEVEL_OUTOF10: 0 - NO PAIN
PAINLEVEL_OUTOF10: 7
PAINLEVEL_OUTOF10: 10 - WORST POSSIBLE PAIN
PAINLEVEL_OUTOF10: 2
PAINLEVEL_OUTOF10: 7

## 2024-04-20 ASSESSMENT — PAIN - FUNCTIONAL ASSESSMENT
PAIN_FUNCTIONAL_ASSESSMENT: 0-10

## 2024-04-20 NOTE — PROGRESS NOTES
"Malathi Rubio is a 68 y.o. female on day 4 of admission presenting with Syncope.    Subjective     She is resting in bed this morning. She moans intermittently but is alert to self and place when asked. She states she feels ok, just tired from not sleeping. We discussed plans for discharge, she wants to chat with her  when he gets here. Will discuss MRI results as well.      Objective     Physical Exam  Constitutional:       General: She is not in acute distress.     Appearance: She is ill-appearing.   HENT:      Head: Normocephalic and atraumatic.      Mouth/Throat:      Mouth: Mucous membranes are moist.   Eyes:      Conjunctiva/sclera: Conjunctivae normal.   Cardiovascular:      Rate and Rhythm: Tachycardia present. Rhythm irregular.      Heart sounds: No murmur heard.  Pulmonary:      Effort: No respiratory distress.      Breath sounds: Rhonchi and rales present. No wheezing.   Abdominal:      General: There is no distension.      Palpations: Abdomen is soft.      Tenderness: There is no abdominal tenderness.   Musculoskeletal:         General: No swelling.   Skin:     General: Skin is warm and dry.      Coloration: Skin is pale.   Neurological:      Mental Status: She is alert and oriented to person, place, and time.   Psychiatric:         Mood and Affect: Mood normal.         Behavior: Behavior normal.     Last Recorded Vitals  Blood pressure 151/74, pulse 70, temperature 36 °C (96.8 °F), temperature source Temporal, resp. rate 12, height 1.651 m (5' 5\"), weight 64.1 kg (141 lb 5 oz), SpO2 94%.  Intake/Output last 3 Shifts:  I/O last 3 completed shifts:  In: 2933.1 (48.6 mL/kg) [I.V.:2833.1 (46.9 mL/kg); IV Piggyback:100]  Out: 550 (9.1 mL/kg) [Urine:550 (0.3 mL/kg/hr)]  Weight: 60.4 kg     Scheduled medications  amiodarone, 400 mg, oral, Daily   Followed by  [START ON 5/1/2024] amiodarone, 200 mg, oral, Daily  amoxicillin-pot clavulanate, 1 tablet, oral, q12h CHEIKH  gabapentin, 300 mg, oral, " BID  HYDROmorphone, 16 mg, oral, q24h CHEIKH  magnesium sulfate, 2 g, intravenous, Daily  methylPREDNISolone sodium succinate (PF), 40 mg, intravenous, q12h  oxygen, , inhalation, Continuous - Inhalation  pantoprazole, 40 mg, oral, Daily before breakfast  polyethylene glycol, 17 g, oral, Daily  potassium chloride CR, 40 mEq, oral, Daily  sennosides-docusate sodium, 2 tablet, oral, BID    Continuous medications  sodium chloride 0.9%, 10 mL/hr    PRN medications  PRN medications: acetaminophen, acetaminophen, alum-mag hydroxide-simeth, benzocaine-menthol, dextromethorphan-guaifenesin, guaiFENesin, HYDROmorphone, ipratropium-albuteroL, metoprolol, ondansetron, ondansetron **OR** [DISCONTINUED] ondansetron, sodium chloride 0.9%    Relevant Results:  MR brain w and wo IV contrast  Result Date: 4/19/2024  1. Limited, motion degraded examination. There are several foci of abnormal signal on diffusion-weighted imaging within the right frontal lobe into a lesser extent the bilateral parietal lobes suspicious for areas of acute to subacute ischemic injury. The distribution raises the possibility of an embolic process. 2. There is curvilinear signal abnormality on a single FLAIR image within right frontal sulci which could be due to artifact. However, subarachnoid hemorrhage or proteinaceous material could also give this appearance and follow-up head CT is recommended. 3. Dural-based enhancing mass overlying the left frontal lobe is nonspecific but could represent a meningioma although given the patient's history dural metastasis is difficult to entirely exclude. 4. Nonspecific white matter changes most likely reflect small-vessel ischemic disease in a patient of this age and also involve the vicki. Small areas of remote ischemic injury are also suspected in the cerebellum.       MACRO: Critical Finding:  See findings. Notification was initiated on 4/19/2024 at 1:51 pm by  Opal Seo.  (**-OCF-**)   Signed by: Opal Seo  4/19/2024 1:53 PM Dictation workstation:   [id number]    Electrocardiogram, 12-lead PRN ACS symptoms  Result Date: 4/17/2024  Atrial fibrillation with rapid ventricular response Nonspecific ST abnormality    ECG 12 lead  Result Date: 4/16/2024  Sinus tachycardia with occasional Premature ventricular complexes Nonspecific ST and T wave abnormality     CT chest abdomen pelvis w IV contrast  Result Date: 4/13/2024  There is a large necrotic cavitary mass in the right posterior thorax measuring 5.1 x 6.6 x 8.3 cm. There is osseous destruction and pathologic fracture of the posterior 5th and 6th ribs. A 1.8 x 2.7 cm spiculated nodule is noted in the left lung apex. Findings likely relate to patient's provided history of carcinoma. Correlate with prior workup.   Advanced centrilobular and paraseptal emphysema noted. Moderate right pleural effusion with areas of loculation. Adjacent airspace opacity favored to relate to compressive atelectasis. Superimposed infection not excluded. Correlate clinically.   There is a large heterogeneous hypoattenuating subcarinal mass measuring 4.3 x 4.2 cm with significant mass-effect on the esophagus with loss of fat planes with the esophagus. This is concerning for necrotic lymph node with local invasion not excluded.   There is a 2 cm heterogeneous hypodense nodule in the left adrenal gland concerning for metastases.   There is a 5.5 x 6.9 x 5.2 cm soft tissue mass in the left scapula with osseous destruction of the scapular body, incompletely imaged. There is diffuse scattered sclerotic lesions throughout the axial and appendicular skeletal system consistent with osseous metastases.   Additional findings as described above.     MACRO: None   Signed by: Clau Mcdonald 4/13/2024 11:10 PM Dictation workstation:   [id number]    CT head wo IV contrast  Result Date: 4/13/2024  No acute intracranial abnormality.   Chronic changes as noted above.   MACRO: None   Signed by: Clau Mcdonald  4/13/2024 10:46 PM Dictation workstation:   [id number]     Latest Reference Range & Units 04/18/24 05:35 04/19/24 05:03 04/20/24 04:48   GLUCOSE 74 - 99 mg/dL 163 (H) 141 (H) 128 (H)   SODIUM 136 - 145 mmol/L 138 137 136   POTASSIUM 3.5 - 5.3 mmol/L 3.4 (L) 3.9 3.7   CHLORIDE 98 - 107 mmol/L 104 107 109 (H)   Bicarbonate 21 - 32 mmol/L 20 (L) 22 20 (L)   Anion Gap 10 - 20 mmol/L 17 12 11   Blood Urea Nitrogen 6 - 23 mg/dL 17 22 26 (H)   Creatinine 0.50 - 1.05 mg/dL 0.77 0.73 0.70   EGFR >60 mL/min/1.73m*2 85 90 >90   Calcium 8.6 - 10.3 mg/dL 9.2 9.0 8.9   FERRITIN 8 - 150 ng/mL 2,540 (H)     FOLATE >5.0 ng/mL 6.5     IRON 35 - 150 ug/dL 80     MAGNESIUM 1.60 - 2.40 mg/dL 1.78 2.20 2.20   LDH 84 - 246 U/L 122     TIBC  COMMENT ONLY     UIBC 110 - 370 ug/dL <55 (L)     % Saturation  COMMENT ONLY     Vitamin B12 211 - 911 pg/mL 1,237 (H)     LEUKOCYTES (10*3/UL) IN BLOOD BY AUTOMATED COUNT, [place] 4.4 - 11.3 x10*3/uL 5.5 4.5 5.1   nRBC 0.0 - 0.0 /100 WBCs 0.0 0.0 0.0   ERYTHROCYTES (10*6/UL) IN BLOOD BY AUTOMATED COUNT, [place] 4.00 - 5.20 x10*6/uL 3.82 (L) 3.72 (L) 3.39 (L)   HEMOGLOBIN 12.0 - 16.0 g/dL 11.9 (L) 11.6 (L) 10.7 (L)   HEMATOCRIT 36.0 - 46.0 % 36.3 36.3 34.4 (L)   MCV 80 - 100 fL 95 98 102 (H)   MCH 26.0 - 34.0 pg 31.2 31.2 31.6   MCHC 32.0 - 36.0 g/dL 32.8 32.0 31.1 (L)   RED CELL DISTRIBUTION WIDTH 11.5 - 14.5 % 20.0 (H) 19.9 (H) 19.6 (H)   PLATELETS (10*3/UL) IN BLOOD AUTOMATED COUNT, [place] 150 - 450 x10*3/uL 82 (L) 80 (L) 49 (L)     Assessment/Plan   Principal Problem:    Syncope  Active Problems:    Acute pain    Small cell lung cancer (Multi)    Malignant neoplasm metastatic to bone (Multi)    COPD (chronic obstructive pulmonary disease) (Multi)    Acute on chronic anemia    Community acquired bacterial pneumonia    Malnutrition (Multi)    PUD (peptic ulcer disease)    Thrombocytopenia (CMS-HCC)    Mixed hyperlipidemia    #Lung cancer with metastases to bone, brain, liver  #COPD, in acute  exacerbation 2/2 pneumonia, bacterial   #Acute hypoxic respiratory failure (improving)   - Patient follows with hem/onc at [facility]  - No leukocytosis   - COVID, flu, RSV negative    - Procal 0.43   - BCx negative x 3 days   - Sputum culture if able   - CT chest with large necrotic cavitary mass in the right posterior thorax with osseous destruction; advanced emphysema, airspace opacity favored to represent compressive atelectasis    - Continue Zosyn (day 6), transition to augmentin today (day 2)  - Continue Solumedrol 40 mg IVP q12h   - Home hydromorphone brought in for pain 2/2 metastases; continue hydromorphone scheduled and PRN   - DuoNebs q4h PRN   - Mucinex BID PRN   - Tylenol PRN for fever   - RT eval and treat protocol  - Bronchial hygiene  - Monitor SpO2 continuously   - Baseline O2 None   - Wean O2 as tolerated; patient currently on 2L O2 via NC    - Pulmonology and hematology/oncology consulted, appreciate recs   - Code status discussion completed with patient and spouse 4/17; now DNRCCA   - MRI Brain 4/19: There are several foci of abnormal signal on diffusion-weighted imaging within the right frontal lobe into a lesser extent the bilateral parietal lobes suspicious for areas of acute to subacute ischemic injury. The distribution raises the possibility of an embolic process. There is curvilinear signal abnormality on a single FLAIR image within right frontal sulci which could be due to artifact. However, subarachnoid hemorrhage or proteinaceous material could also give this appearance and follow-up head CT is recommended. Dural-based enhancing mass overlying the left frontal lobe is nonspecific but could represent a meningioma although given the patient's history dural metastasis is difficult to entirely exclude. 4. Nonspecific white matter changes most likely reflect small-vessel ischemic disease in a patient of this age and also involve the vicki. Small areas of remote ischemic injury are also  suspected in the cerebellum.    - Neuro on call consulted with to review results, further recommendations, etc..     #Hypokalemia (improving)   #Hypomagnesemia (resolved)  - K 3.0 > 2.8 > 3.4 > 3.9 > 3.7  - Mag 1.78 > 2.20 > 2.20  - Telemetry monitoring   - Encourage PO intake as tolerated  - Daily BMP and mag level  - replete as needed     #AF RVR (improving)   #Non-MI elevated troponins   - Received Lopressor 5 mg IVP x 1 dose with improvement in HR   - Currently AF rate 100s   - Trop  98 > 92 > 80 > 154 > 75 > 68, patient denies chest pain   - Telemetry monitoring  - Not on anticoagulation due to anemia  - Cardiology consulted, start amiodarone 400 mg PO every day x 2 weeks then 200 mg PO every day   - PRN metoprolol as needed for HR control     #Syncope (resolved)  #Acute on chronic anemia (improving)   #Thrombocytopenia (improving)   - Patient had syncopal episode at home while on the toilet; has had poor oral intake at home   - CT head with no acute intracranial abnormality  - H/H 11.9/36.3 > 11.6/36.3 > 10.7/34.4  - plts 82 > 80 > 49  - Anticoagulation/DVT ppx held  - Patient received 1 unit pRBCs in the ED and again 4/17   - B12 1232, folate 12.9, iron 22, ferritin 2540, % saturation not calculated  - Encourage PO intake as tolerated  - Telemetry monitoring  - Daily CBC      #Malnutrition 2/2 active malignancy  - Albumin 2.4   - Regular diet ordered  - Nutrition consulted, appreciate recs  -Malnutrition Diagnosis Status: New  Malnutrition Diagnosis: Severe malnutrition related to chronic disease or condition  As Evidenced by: significant unintentional weight loss of 8.9% x 3 months and 16.7% x 6 months; poor nutritional intake meeting less than 75% of estimated needs for greater than or equal to one month.  I agree with the dietitian's malnutrition diagnosis.     DVT PPx: SCDs   GI PPx: Protonix  Diet: Regular  Code Status: DNRCCA/DNI- discussed with patient and spouse 4/17      Disposition: Patient requires  inpatient management at this time.    Total accumulated time spent face to face and not face to face preparing to see the patient, obtaining and reviewing separately obtained history; performing a medically appropriate examination and/or evaluation; counseling and educating the patient, family; ordering medications, tests, or procedures; referring and communicating with other health care professionals; documenting clinical information in the patient's medical record; independently interpreting results and communicating the results to the patient, family; and care coordination was 35 minutes.      Amrita Pinon, APRN-CNP

## 2024-04-20 NOTE — NURSING NOTE
at bedside, informed of bradycardia last nite. Routine lopressor discontinued. Prn remains    0800. Earlier at 0730 patient wanted to use bathroom. Ambulated with walker and gait belt with heavy assist.patient states she is going to fall. Placed on toilet. Patient then returned to bed with 2 assist. Patient very weak and unable to walk. Able to stand and pivot. Patient states she feels like she is going to pass out while  sitting

## 2024-04-20 NOTE — PROGRESS NOTES
Subjective Data:  No chest pain or dyspnea has fatigue    Overnight Events:    none     Objective Data:  Last Recorded Vitals:  Vitals:    04/20/24 0700 04/20/24 0800 04/20/24 1158 04/20/24 1200   BP:  151/74 144/75    BP Location:  Left arm Left arm    Patient Position:  Lying Lying    Pulse: 70 70  85   Resp: 11 12  16   Temp:  36 °C (96.8 °F) 36.1 °C (97 °F)    TempSrc:  Temporal Temporal    SpO2: 93% 94%  96%   Weight:  64.1 kg (141 lb 5 oz)     Height:           Last Labs:  CBC - 4/20/2024:  4:48 AM  5.1 10.7 49    34.4      CMP - 4/20/2024:  4:48 AM  8.9 4.5 6 --- 0.7   _ 2.4 7 78      PTT - No results in last year.  _   _ _     TROPHS   Date/Time Value Ref Range Status   04/17/2024 10:13 PM 68 0 - 13 ng/L Final   04/16/2024 11:18 AM 75 0 - 13 ng/L Final   04/15/2024 02:01  0 - 13 ng/L Final     BNP   Date/Time Value Ref Range Status   04/13/2024 08:35  0 - 99 pg/mL Final     HGBA1C   Date/Time Value Ref Range Status   03/11/2024 12:03 PM 5.0 4.3 - 5.6 % Final     Comment:     American Diabetes Association guidelines indicate that patients with HgbA1c in the range 5.7-6.4% are at increased risk for development of diabetes, and intervention by lifestyle modification may be beneficial. HgbA1c greater or equal to 6.5% is considered diagnostic of diabetes.   07/06/2023 02:58 PM 6.7 4.3 - 5.6 % Final     Comment:     American Diabetes Association guidelines indicate that patients with HgbA1c in the range 5.7-6.4% are at increased risk for development of diabetes, and intervention by lifestyle modification may be beneficial. HgbA1c greater or equal to 6.5% is considered diagnostic of diabetes.      Last I/O:  I/O last 3 completed shifts:  In: 2933.1 (48.6 mL/kg) [I.V.:2833.1 (46.9 mL/kg); IV Piggyback:100]  Out: 550 (9.1 mL/kg) [Urine:550 (0.3 mL/kg/hr)]  Weight: 60.4 kg     Past Cardiology Tests (Last 3 Years):  EKG:  Electrocardiogram, 12-lead PRN ACS symptoms 04/18/2024 (Preliminary)      ECG 12 lead  "04/16/2024      Electrocardiogram, 12-lead PRN ACS symptoms 04/16/2024 (Preliminary)      ECG 12 lead 04/13/2024    Echo:  No results found for this or any previous visit from the past 1095 days.    Ejection Fractions:  No results found for: \"EF\"  Cath:  No results found for this or any previous visit from the past 1095 days.    Stress Test:  No results found for this or any previous visit from the past 1095 days.    Cardiac Imaging:  No results found for this or any previous visit from the past 1095 days.      Inpatient Medications:  Scheduled medications   Medication Dose Route Frequency    amiodarone  400 mg oral Daily    Followed by    [START ON 5/1/2024] amiodarone  200 mg oral Daily    amoxicillin-pot clavulanate  1 tablet oral q12h CHEIKH    gabapentin  300 mg oral BID    HYDROmorphone  16 mg oral q24h CHEIKH    magnesium sulfate  2 g intravenous Daily    oxygen   inhalation Continuous - Inhalation    pantoprazole  40 mg oral Daily before breakfast    polyethylene glycol  17 g oral Daily    potassium chloride CR  40 mEq oral Daily    [START ON 4/21/2024] predniSONE  40 mg oral Daily    sennosides-docusate sodium  2 tablet oral BID     PRN medications   Medication    acetaminophen    acetaminophen    alum-mag hydroxide-simeth    benzocaine-menthol    dextromethorphan-guaifenesin    guaiFENesin    HYDROmorphone    ipratropium-albuteroL    metoprolol    ondansetron    ondansetron    sodium chloride 0.9%     Continuous Medications   Medication Dose Last Rate    sodium chloride 0.9%  10 mL/hr         Physical Exam:  Constitutional: Well developed, awake/alert/oriented x3, no distress, alert and cooperative  Eyes: PERRL, EOMI, clear sclera  ENMT: mucous membranes moist, no apparent injury, no lesions seen  Head/Neck: Neck supple, no apparent injury, thyroid without mass or tenderness, No JVD, trachea midline, no bruits  Respiratory/Thorax: Patent airways, CTAB, normal breath sounds with good chest expansion, thorax " symmetric  Cardiovascular: Regular, rate and rhythm, no murmurs, 2+ equal pulses of the extremities, normal S 1and S 2  Gastrointestinal: Nondistended, soft, non-tender, no rebound tenderness or guarding, no masses palpable, no organomegaly, +BS, no bruits  Musculoskeletal: ROM intact, no joint swelling, normal strength  Extremities: normal extremities, no cyanosis edema, contusions or wounds, no clubbing  Neurological: alert and oriented x3, intact senses, motor, response and reflexes, normal strength  Lymphatic: No significant lymphadenopathy  Psychological: Appropriate mood and behavior  Skin: Warm and dry, no lesions, no rashes      Assessment/Plan   Syncope  -Most likely vasovagal and acute on chronic anemia  -Hemoglobin 5.9 on admit  -S/p PRBC transfusion  -Platelets low, 68 today  -Also with A-fib with RVR, now sinus rhythm with AF paroxysms      2.  Paroxysmal atrial fibrillation with RVR, intermittent breakthrough episodes   -Converted to sinus rhythm after IV Lopressor, magnesium, potassium  -Not a candidate for anticoagulation due to thrombocytopenia and severe anemia  -Started amiodarone 400 mg daily x 2 weeks then 200 mg daily  -Monitor on telemetry  -IV metoprolol as needed     3.  Elevated troponins-non-MI elevation  -Denies ACS symptoms  -EKG without acute ischemic changes  -Not a candidate for invasive cardiac procedures     4.  COPD exacerbation  -CT reviewed  -Steroids  -Bronchodilators  -Pulmonary consulted  -Bronchial hygiene     5.  Hypokalemia, hypomagnesemia  -Supplement  -Monitor on telemetry     6.  Acute on chronic anemia, thrombocytopenia  -Transfused with PRBCs  -Monitor CBC      Code Status:  DNR and No Intubation     Peripheral IV 04/16/24 22 G Dorsal;Left Hand (Active)   Site Assessment Clean;Dry;Intact 04/20/24 0740   Dressing Type Transparent 04/20/24 0740   Line Status Blood return noted;Flushed 04/20/24 0740   Dressing Status Clean;Dry 04/20/24 0740   Number of days: 4       Code  Status:  DNR and No Intubation    I spent 15 minutes in the professional and overall care of this patient.        Juan Quintero MD

## 2024-04-20 NOTE — NURSING NOTE
Unable to get vitals due to patient agitation episode.  bedside to calm patient down. Husbands states patient has been like this due to her medications in he past. He is bedside at this time. Once patient has settled will try to get vitals signs. Will continue to monitor patient

## 2024-04-20 NOTE — CARE PLAN
The patient's goals for the shift include pain relief    The clinical goals for the shift include Patient will have pain controlled this shift    Over the shift, the patient did not make progress toward the following goals. Barriers to progression include intermittent confusion overnight,  had be called around 2320. Patient still confused/agitated. Will continue to monitor overnight

## 2024-04-20 NOTE — PROGRESS NOTES
Physical Therapy                 Therapy Communication Note    Patient Name: Malathi Rubio  MRN: [id number]  Today's Date: 4/20/2024     Discipline: Physical Therapy    Missed Visit Reason: Missed Visit Reason: Other (Comment) (Hold per nursing due to bradycardia)    Missed Time: Attempt    Comment: Held by nursing on this date due to Bradycardia

## 2024-04-21 LAB
ANION GAP SERPL CALC-SCNC: 9 MMOL/L (ref 10–20)
BASOPHILS # BLD AUTO: 0.03 X10*3/UL (ref 0–0.1)
BASOPHILS NFR BLD AUTO: 0.3 %
BUN SERPL-MCNC: 22 MG/DL (ref 6–23)
CALCIUM SERPL-MCNC: 9.3 MG/DL (ref 8.6–10.3)
CHLORIDE SERPL-SCNC: 107 MMOL/L (ref 98–107)
CO2 SERPL-SCNC: 25 MMOL/L (ref 21–32)
CREAT SERPL-MCNC: 0.52 MG/DL (ref 0.5–1.05)
EGFRCR SERPLBLD CKD-EPI 2021: >90 ML/MIN/1.73M*2
EOSINOPHIL # BLD AUTO: 0.06 X10*3/UL (ref 0–0.7)
EOSINOPHIL NFR BLD AUTO: 0.5 %
ERYTHROCYTE [DISTWIDTH] IN BLOOD BY AUTOMATED COUNT: 18.8 % (ref 11.5–14.5)
GLUCOSE SERPL-MCNC: 89 MG/DL (ref 74–99)
HCT VFR BLD AUTO: 33.6 % (ref 36–46)
HGB BLD-MCNC: 11.2 G/DL (ref 12–16)
IMM GRANULOCYTES # BLD AUTO: 0.28 X10*3/UL (ref 0–0.7)
IMM GRANULOCYTES NFR BLD AUTO: 2.4 % (ref 0–0.9)
LYMPHOCYTES # BLD AUTO: 0.91 X10*3/UL (ref 1.2–4.8)
LYMPHOCYTES NFR BLD AUTO: 7.9 %
MAGNESIUM SERPL-MCNC: 2.01 MG/DL (ref 1.6–2.4)
MCH RBC QN AUTO: 30.9 PG (ref 26–34)
MCHC RBC AUTO-ENTMCNC: 33.3 G/DL (ref 32–36)
MCV RBC AUTO: 93 FL (ref 80–100)
MONOCYTES # BLD AUTO: 0.85 X10*3/UL (ref 0.1–1)
MONOCYTES NFR BLD AUTO: 7.4 %
NEUTROPHILS # BLD AUTO: 9.42 X10*3/UL (ref 1.2–7.7)
NEUTROPHILS NFR BLD AUTO: 81.5 %
NRBC BLD-RTO: 0 /100 WBCS (ref 0–0)
PLATELET # BLD AUTO: 59 X10*3/UL (ref 150–450)
POTASSIUM SERPL-SCNC: 3.3 MMOL/L (ref 3.5–5.3)
RBC # BLD AUTO: 3.62 X10*6/UL (ref 4–5.2)
SODIUM SERPL-SCNC: 138 MMOL/L (ref 136–145)
WBC # BLD AUTO: 11.6 X10*3/UL (ref 4.4–11.3)

## 2024-04-21 PROCEDURE — 36415 COLL VENOUS BLD VENIPUNCTURE: CPT | Mod: IPSPLIT | Performed by: NURSE PRACTITIONER

## 2024-04-21 PROCEDURE — 83735 ASSAY OF MAGNESIUM: CPT | Mod: IPSPLIT | Performed by: NURSE PRACTITIONER

## 2024-04-21 PROCEDURE — 93005 ELECTROCARDIOGRAM TRACING: CPT | Mod: IPSPLIT

## 2024-04-21 PROCEDURE — 2500000001 HC RX 250 WO HCPCS SELF ADMINISTERED DRUGS (ALT 637 FOR MEDICARE OP): Mod: IPSPLIT | Performed by: NURSE PRACTITIONER

## 2024-04-21 PROCEDURE — 2500000001 HC RX 250 WO HCPCS SELF ADMINISTERED DRUGS (ALT 637 FOR MEDICARE OP): Mod: IPSPLIT

## 2024-04-21 PROCEDURE — 99233 SBSQ HOSP IP/OBS HIGH 50: CPT | Performed by: NURSE PRACTITIONER

## 2024-04-21 PROCEDURE — 85025 COMPLETE CBC W/AUTO DIFF WBC: CPT | Mod: IPSPLIT | Performed by: NURSE PRACTITIONER

## 2024-04-21 PROCEDURE — 82374 ASSAY BLOOD CARBON DIOXIDE: CPT | Mod: IPSPLIT | Performed by: NURSE PRACTITIONER

## 2024-04-21 PROCEDURE — 2500000006 HC RX 250 W HCPCS SELF ADMINISTERED DRUGS (ALT 637 FOR ALL PAYERS): Mod: IPSPLIT | Performed by: NURSE PRACTITIONER

## 2024-04-21 PROCEDURE — 2500000004 HC RX 250 GENERAL PHARMACY W/ HCPCS (ALT 636 FOR OP/ED): Mod: IPSPLIT | Performed by: NURSE PRACTITIONER

## 2024-04-21 PROCEDURE — 1200000002 HC GENERAL ROOM WITH TELEMETRY DAILY: Mod: IPSPLIT

## 2024-04-21 RX ORDER — METHOCARBAMOL 500 MG/1
500 TABLET, FILM COATED ORAL EVERY 8 HOURS PRN
Status: DISCONTINUED | OUTPATIENT
Start: 2024-04-21 | End: 2024-04-25 | Stop reason: HOSPADM

## 2024-04-21 RX ORDER — SODIUM CHLORIDE 9 MG/ML
125 INJECTION, SOLUTION INTRAVENOUS CONTINUOUS
Status: DISPENSED | OUTPATIENT
Start: 2024-04-21 | End: 2024-04-21

## 2024-04-21 RX ADMIN — MAGNESIUM SULFATE IN WATER 2 G: 40 INJECTION, SOLUTION INTRAVENOUS at 09:49

## 2024-04-21 RX ADMIN — AMIODARONE HYDROCHLORIDE 400 MG: 200 TABLET ORAL at 09:50

## 2024-04-21 RX ADMIN — POTASSIUM CHLORIDE 40 MEQ: 1500 TABLET, EXTENDED RELEASE ORAL at 09:49

## 2024-04-21 RX ADMIN — ACETAMINOPHEN 650 MG: 325 TABLET ORAL at 23:22

## 2024-04-21 RX ADMIN — HYDROMORPHONE HYDROCHLORIDE 2 MG: 2 TABLET ORAL at 15:00

## 2024-04-21 RX ADMIN — AMOXICILLIN AND CLAVULANATE POTASSIUM 1 TABLET: 875; 125 TABLET, FILM COATED ORAL at 20:33

## 2024-04-21 RX ADMIN — GABAPENTIN 300 MG: 300 CAPSULE ORAL at 09:49

## 2024-04-21 RX ADMIN — METHOCARBAMOL 500 MG: 500 TABLET ORAL at 01:17

## 2024-04-21 RX ADMIN — HYDROMORPHONE HYDROCHLORIDE 2 MG: 2 TABLET ORAL at 00:15

## 2024-04-21 RX ADMIN — METHOCARBAMOL 500 MG: 500 TABLET ORAL at 17:25

## 2024-04-21 RX ADMIN — PANTOPRAZOLE SODIUM 40 MG: 40 TABLET, DELAYED RELEASE ORAL at 06:04

## 2024-04-21 RX ADMIN — ACETAMINOPHEN 650 MG: 325 TABLET ORAL at 03:44

## 2024-04-21 RX ADMIN — SODIUM CHLORIDE 125 ML/HR: 9 INJECTION, SOLUTION INTRAVENOUS at 09:49

## 2024-04-21 RX ADMIN — GABAPENTIN 300 MG: 300 CAPSULE ORAL at 20:32

## 2024-04-21 RX ADMIN — HYDROMORPHONE HYDROCHLORIDE 16 MG: 16 TABLET, EXTENDED RELEASE ORAL at 09:00

## 2024-04-21 RX ADMIN — AMOXICILLIN AND CLAVULANATE POTASSIUM 1 TABLET: 875; 125 TABLET, FILM COATED ORAL at 09:49

## 2024-04-21 RX ADMIN — PREDNISONE 40 MG: 20 TABLET ORAL at 09:49

## 2024-04-21 RX ADMIN — HYDROMORPHONE HYDROCHLORIDE 2 MG: 2 TABLET ORAL at 22:50

## 2024-04-21 RX ADMIN — HYDROMORPHONE HYDROCHLORIDE 2 MG: 2 TABLET ORAL at 05:18

## 2024-04-21 RX ADMIN — HYDROMORPHONE HYDROCHLORIDE 2 MG: 2 TABLET ORAL at 19:01

## 2024-04-21 ASSESSMENT — PAIN - FUNCTIONAL ASSESSMENT
PAIN_FUNCTIONAL_ASSESSMENT: 0-10

## 2024-04-21 ASSESSMENT — COGNITIVE AND FUNCTIONAL STATUS - GENERAL
MOBILITY SCORE: 12
STANDING UP FROM CHAIR USING ARMS: A LOT
WALKING IN HOSPITAL ROOM: A LOT
DRESSING REGULAR UPPER BODY CLOTHING: TOTAL
TURNING FROM BACK TO SIDE WHILE IN FLAT BAD: A LOT
TOILETING: TOTAL
PERSONAL GROOMING: TOTAL
CLIMB 3 TO 5 STEPS WITH RAILING: TOTAL
MOVING FROM LYING ON BACK TO SITTING ON SIDE OF FLAT BED WITH BEDRAILS: A LITTLE
DRESSING REGULAR LOWER BODY CLOTHING: TOTAL
MOVING TO AND FROM BED TO CHAIR: A LOT
DAILY ACTIVITIY SCORE: 7
EATING MEALS: A LOT
HELP NEEDED FOR BATHING: TOTAL

## 2024-04-21 ASSESSMENT — PAIN SCALES - GENERAL
PAINLEVEL_OUTOF10: 8
PAINLEVEL_OUTOF10: 5 - MODERATE PAIN
PAINLEVEL_OUTOF10: 8
PAINLEVEL_OUTOF10: 7
PAINLEVEL_OUTOF10: 8
PAINLEVEL_OUTOF10: 5 - MODERATE PAIN
PAINLEVEL_OUTOF10: 10 - WORST POSSIBLE PAIN
PAINLEVEL_OUTOF10: 3
PAINLEVEL_OUTOF10: 0 - NO PAIN
PAINLEVEL_OUTOF10: 9

## 2024-04-21 ASSESSMENT — PAIN DESCRIPTION - LOCATION
LOCATION: GENERALIZED

## 2024-04-21 ASSESSMENT — PAIN SCALES - WONG BAKER: WONGBAKER_NUMERICALRESPONSE: HURTS WORST

## 2024-04-21 NOTE — PROGRESS NOTES
"Malathi Rubio is a 68 y.o. female on day 5 of admission presenting with Syncope.    Subjective   She is resting in bed this morning. Her  is at bedside. She moans intermittently but is alert to self and place when asked. She states she feels ok, just tired from not sleeping. We chatted about plans going forward and what they are thinking about, rehab versus home. They would like to discuss later. MRI results discussed with Neurology, will review with  and pt.      Objective     Physical Exam  Constitutional:       General: She is not in acute distress.     Appearance: She is ill-appearing.   HENT:      Head: Normocephalic and atraumatic.      Mouth/Throat:      Mouth: Mucous membranes are moist.   Eyes:      Conjunctiva/sclera: Conjunctivae normal.   Cardiovascular:      Rate and Rhythm: Normal rate. Rhythm irregular.      Heart sounds: No murmur heard.  Pulmonary:      Effort: No respiratory distress.      Breath sounds: Rhonchi and rales present. No wheezing.   Abdominal:      General: There is no distension.      Palpations: Abdomen is soft.      Tenderness: There is no abdominal tenderness.   Musculoskeletal:         General: No swelling.   Skin:     General: Skin is warm and dry.      Coloration: Skin is pale.   Neurological:      Mental Status: She is alert and oriented to person, place, and time.   Psychiatric:         Mood and Affect: Mood normal.         Behavior: Behavior normal.     Last Recorded Vitals  Blood pressure 116/69, pulse 80, temperature 36.1 °C (97 °F), temperature source Temporal, resp. rate 18, height 1.651 m (5' 5\"), weight 64.1 kg (141 lb 5 oz), SpO2 95%.  Intake/Output last 3 Shifts:  I/O last 3 completed shifts:  In: 290 (4.5 mL/kg) [P.O.:240; I.V.:50 (0.8 mL/kg)]  Out: 652 (10.2 mL/kg) [Urine:650 (0.3 mL/kg/hr); Stool:2]  Weight: 64.1 kg     Scheduled medications  amiodarone, 400 mg, oral, Daily   Followed by  [START ON 5/1/2024] amiodarone, 200 mg, oral, " Daily  amoxicillin-pot clavulanate, 1 tablet, oral, q12h CHEIKH  gabapentin, 300 mg, oral, BID  HYDROmorphone, 16 mg, oral, q24h CHEIKH  magnesium sulfate, 2 g, intravenous, Daily  oxygen, , inhalation, Continuous - Inhalation  pantoprazole, 40 mg, oral, Daily before breakfast  polyethylene glycol, 17 g, oral, Daily  potassium chloride CR, 40 mEq, oral, Daily  predniSONE, 40 mg, oral, Daily  sennosides-docusate sodium, 2 tablet, oral, BID    Continuous medications  sodium chloride 0.9%, 10 mL/hr  sodium chloride 0.9%, 125 mL/hr, Last Rate: 125 mL/hr (04/21/24 0949)    PRN medications  PRN medications: acetaminophen, acetaminophen, alum-mag hydroxide-simeth, benzocaine-menthol, dextromethorphan-guaifenesin, guaiFENesin, HYDROmorphone, ipratropium-albuteroL, methocarbamol, metoprolol, ondansetron, ondansetron **OR** [DISCONTINUED] ondansetron, sodium chloride 0.9%    Relevant Results:  MR brain w and wo IV contrast  Result Date: 4/19/2024  1. Limited, motion degraded examination. There are several foci of abnormal signal on diffusion-weighted imaging within the right frontal lobe into a lesser extent the bilateral parietal lobes suspicious for areas of acute to subacute ischemic injury. The distribution raises the possibility of an embolic process. 2. There is curvilinear signal abnormality on a single FLAIR image within right frontal sulci which could be due to artifact. However, subarachnoid hemorrhage or proteinaceous material could also give this appearance and follow-up head CT is recommended. 3. Dural-based enhancing mass overlying the left frontal lobe is nonspecific but could represent a meningioma although given the patient's history dural metastasis is difficult to entirely exclude. 4. Nonspecific white matter changes most likely reflect small-vessel ischemic disease in a patient of this age and also involve the vicki. Small areas of remote ischemic injury are also suspected in the cerebellum.       MACRO: Critical  Finding:  See findings. Notification was initiated on 4/19/2024 at 1:51 pm by  Opal Seo.  (**-OCF-**)   Signed by: Opal Seo 4/19/2024 1:53 PM Dictation workstation:   [id number]    Electrocardiogram, 12-lead PRN ACS symptoms  Result Date: 4/17/2024  Atrial fibrillation with rapid ventricular response Nonspecific ST abnormality    ECG 12 lead  Result Date: 4/16/2024  Sinus tachycardia with occasional Premature ventricular complexes Nonspecific ST and T wave abnormality     CT chest abdomen pelvis w IV contrast  Result Date: 4/13/2024  There is a large necrotic cavitary mass in the right posterior thorax measuring 5.1 x 6.6 x 8.3 cm. There is osseous destruction and pathologic fracture of the posterior 5th and 6th ribs. A 1.8 x 2.7 cm spiculated nodule is noted in the left lung apex. Findings likely relate to patient's provided history of carcinoma. Correlate with prior workup.   Advanced centrilobular and paraseptal emphysema noted. Moderate right pleural effusion with areas of loculation. Adjacent airspace opacity favored to relate to compressive atelectasis. Superimposed infection not excluded. Correlate clinically.   There is a large heterogeneous hypoattenuating subcarinal mass measuring 4.3 x 4.2 cm with significant mass-effect on the esophagus with loss of fat planes with the esophagus. This is concerning for necrotic lymph node with local invasion not excluded.   There is a 2 cm heterogeneous hypodense nodule in the left adrenal gland concerning for metastases.   There is a 5.5 x 6.9 x 5.2 cm soft tissue mass in the left scapula with osseous destruction of the scapular body, incompletely imaged. There is diffuse scattered sclerotic lesions throughout the axial and appendicular skeletal system consistent with osseous metastases.   Additional findings as described above.     MACRO: None   Signed by: Clau Mcdonald 4/13/2024 11:10 PM Dictation workstation:   [id number]    CT head wo IV  contrast  Result Date: 4/13/2024  No acute intracranial abnormality.   Chronic changes as noted above.   MACRO: None   Signed by: Clau Mcdonadl 4/13/2024 10:46 PM Dictation workstation:   [id number]     Latest Reference Range & Units 04/18/24 05:35 04/19/24 05:03 04/20/24 04:48 04/21/24 06:38   GLUCOSE 74 - 99 mg/dL 163 (H) 141 (H) 128 (H) 89   SODIUM 136 - 145 mmol/L 138 137 136 138   POTASSIUM 3.5 - 5.3 mmol/L 3.4 (L) 3.9 3.7 3.3 (L)   CHLORIDE 98 - 107 mmol/L 104 107 109 (H) 107   Bicarbonate 21 - 32 mmol/L 20 (L) 22 20 (L) 25   Anion Gap 10 - 20 mmol/L 17 12 11 9 (L)   Blood Urea Nitrogen 6 - 23 mg/dL 17 22 26 (H) 22   Creatinine 0.50 - 1.05 mg/dL 0.77 0.73 0.70 0.52   EGFR >60 mL/min/1.73m*2 85 90 >90 >90   Calcium 8.6 - 10.3 mg/dL 9.2 9.0 8.9 9.3      Latest Reference Range & Units 04/18/24 05:35 04/19/24 05:03 04/20/24 04:48 04/21/24 06:38   LEUKOCYTES (10*3/UL) IN BLOOD BY AUTOMATED COUNT, [place] 4.4 - 11.3 x10*3/uL 5.5 4.5 5.1 11.6 (H)   nRBC 0.0 - 0.0 /100 WBCs 0.0 0.0 0.0 0.0   ERYTHROCYTES (10*6/UL) IN BLOOD BY AUTOMATED COUNT, [place] 4.00 - 5.20 x10*6/uL 3.82 (L) 3.72 (L) 3.39 (L) 3.62 (L)   HEMOGLOBIN 12.0 - 16.0 g/dL 11.9 (L) 11.6 (L) 10.7 (L) 11.2 (L)   HEMATOCRIT 36.0 - 46.0 % 36.3 36.3 34.4 (L) 33.6 (L)   MCV 80 - 100 fL 95 98 102 (H) 93   MCH 26.0 - 34.0 pg 31.2 31.2 31.6 30.9   MCHC 32.0 - 36.0 g/dL 32.8 32.0 31.1 (L) 33.3   RED CELL DISTRIBUTION WIDTH 11.5 - 14.5 % 20.0 (H) 19.9 (H) 19.6 (H) 18.8 (H)   PLATELETS (10*3/UL) IN BLOOD AUTOMATED COUNT, [place] 150 - 450 x10*3/uL 82 (L) 80 (L) 49 (L) 59 (L)     Assessment/Plan   Principal Problem:    Syncope  Active Problems:    Acute pain    Small cell lung cancer (Multi)    Malignant neoplasm metastatic to bone (Multi)    COPD (chronic obstructive pulmonary disease) (Multi)    Acute on chronic anemia    Community acquired bacterial pneumonia    Malnutrition (Multi)    PUD (peptic ulcer disease)    Thrombocytopenia (CMS-HCC)    Mixed  hyperlipidemia    #Lung cancer with metastases to bone, brain, liver  #COPD, in acute exacerbation 2/2 pneumonia, bacterial   #Acute hypoxic respiratory failure (improving)   - Patient follows with hem/onc at [facility]  - No leukocytosis   - COVID, flu, RSV negative    - Procal 0.43   - BCx negative x 3 days   - CT chest with large necrotic cavitary mass in the right posterior thorax with osseous destruction; advanced emphysema, airspace opacity favored to represent compressive atelectasis    - Continue Zosyn (day 6), transition to augmentin today (day 3)  - Continue Solumedrol 40 mg IVP q12h, transition to oral prednisone  - Home hydromorphone brought in for pain 2/2 metastases; continue hydromorphone scheduled and PRN   - DuoNebs q4h PRN   - Mucinex BID PRN   - Tylenol PRN for fever   - RT eval and treat protocol, Bronchial hygiene  - Monitor SpO2 continuously, baseline O2 None   - Wean O2 as tolerated; patient currently on 2L O2 via NC overnight    - Pulmonology and hematology/oncology consulted, appreciate recs   - Code status discussion completed with patient and spouse 4/17; now DNRCCA   - MRI Brain 4/19: There are several foci of abnormal signal on diffusion-weighted imaging within the right frontal lobe into a lesser extent the bilateral parietal lobes suspicious for areas of acute to subacute ischemic injury. The distribution raises the possibility of an embolic process. There is curvilinear signal abnormality on a single FLAIR image within right frontal sulci which could be due to artifact. However, subarachnoid hemorrhage or proteinaceous material could also give this appearance and follow-up head CT is recommended. Dural-based enhancing mass overlying the left frontal lobe is nonspecific but could represent a meningioma although given the patient's history dural metastasis is difficult to entirely exclude. 4. Nonspecific white matter changes most likely reflect small-vessel ischemic disease in a patient  of this age and also involve the vicki. Small areas of remote ischemic injury are also suspected in the cerebellum.    - Neuro on call consulted with to review results, further recommendations, etc.. (adding echocardiogram)     #Hypokalemia (improving)   #Hypomagnesemia (resolved)  - K 3.0 > 2.8 > 3.3  - Mag 1.78 > 2.20 > 2.01  - Telemetry monitoring   - Encourage PO intake as tolerated  - Daily BMP and mag level  - replete as needed     #AF RVR (improving)   #Non-MI elevated troponins   - Received Lopressor 5 mg IVP x 1 dose with improvement in HR   - Currently AF rate 100s   - Trop  98 > 92 > 80 > 154 > 75 > 68, patient denies chest pain   - Telemetry monitoring  - Not on anticoagulation due to anemia  - Cardiology consulted, start amiodarone 400 mg PO every day x 2 weeks then 200 mg PO every day   - PRN metoprolol as needed for HR control     #Syncope (resolved)  #Acute on chronic anemia (improving)   #Thrombocytopenia (improving)   - Patient had syncopal episode at home while on the toilet; has had poor oral intake at home   - CT head with no acute intracranial abnormality  - H/H 11.9/36.3 > 11.6/36.3 > 10.7/34.4 > 11.2/33.6  - plts 82 > 80 > 49 > 59  - Anticoagulation/DVT ppx held  - Patient received 1 unit pRBCs in the ED and again 4/17   - B12 1232, folate 12.9, iron 22, ferritin 2540, % saturation not calculated  - Encourage PO intake as tolerated  - Telemetry monitoring  - Daily CBC      #Malnutrition 2/2 active malignancy  - Albumin 2.4   - Regular diet ordered  - Nutrition consulted, appreciate recs  -Malnutrition Diagnosis Status: New  Malnutrition Diagnosis: Severe malnutrition related to chronic disease or condition  As Evidenced by: significant unintentional weight loss of 8.9% x 3 months and 16.7% x 6 months; poor nutritional intake meeting less than 75% of estimated needs for greater than or equal to one month.  I agree with the dietitian's malnutrition diagnosis.     DVT PPx: SCDs   GI PPx:  Protonix  Diet: Regular  Code Status: DNRCCA/DNI- discussed with patient and spouse 4/17      Disposition: Patient requires inpatient management at this time.    Total accumulated time spent face to face and not face to face preparing to see the patient, obtaining and reviewing separately obtained history; performing a medically appropriate examination and/or evaluation; counseling and educating the patient, family; ordering medications, tests, or procedures; referring and communicating with other health care professionals; documenting clinical information in the patient's medical record; independently interpreting results and communicating the results to the patient, family; and care coordination was 35 minutes.      Amrita Pinon, APRN-CNP

## 2024-04-21 NOTE — CARE PLAN
The patient's goals for the shift include pain relief    The clinical goals for the shift include Patient will be free of injury throughout this shift    Problem: Pain  Goal: My pain/discomfort is manageable  Outcome: Progressing     Problem: Safety  Goal: Patient will be injury free during hospitalization  Outcome: Progressing  Goal: I will remain free of falls  Outcome: Progressing     Problem: Daily Care  Goal: Daily care needs are met  Outcome: Progressing     Problem: Psychosocial Needs  Goal: Demonstrates ability to cope with hospitalization/illness  Outcome: Progressing  Goal: Collaborate with me, my family, and caregiver to identify my specific goals  Outcome: Progressing     Problem: Discharge Barriers  Goal: My discharge needs are met  Outcome: Progressing     Problem: Respiratory  Goal: Clear secretions with interventions this shift  Outcome: Progressing  Goal: Minimize anxiety/maximize coping throughout shift  Outcome: Progressing  Goal: Minimal/no exertional discomfort or dyspnea this shift  Outcome: Progressing  Goal: Verbalize decreased shortness of breath this shift  Outcome: Progressing  Goal: Wean oxygen to maintain O2 saturation per order/standard this shift  Outcome: Progressing  Goal: Increase self care and/or family involvement in next 24 hours  Outcome: Progressing  Goal: No signs of respiratory distress (eg. Use of accessory muscles. Peds grunting)  Outcome: Progressing  Goal: Patent airway maintained this shift  Outcome: Progressing     Problem: Skin  Goal: Decreased wound size/increased tissue granulation at next dressing change  Outcome: Progressing  Goal: Participates in plan/prevention/treatment measures  Outcome: Progressing  Goal: Prevent/manage excess moisture  Outcome: Progressing  Goal: Prevent/minimize sheer/friction injuries  Outcome: Progressing  Goal: Promote/optimize nutrition  Outcome: Progressing  Goal: Promote skin healing  Outcome: Progressing     Problem: Pain -  Adult  Goal: Verbalizes/displays adequate comfort level or baseline comfort level  Outcome: Progressing     Problem: Safety - Adult  Goal: Free from fall injury  Outcome: Progressing     Problem: Discharge Planning  Goal: Discharge to home or other facility with appropriate resources  Outcome: Progressing     Problem: Chronic Conditions and Co-morbidities  Goal: Patient's chronic conditions and co-morbidity symptoms are monitored and maintained or improved  Outcome: Progressing     Problem: Pain  Goal: Takes deep breaths with improved pain control throughout the shift  Outcome: Progressing  Goal: Turns in bed with improved pain control throughout the shift  Outcome: Progressing  Goal: Walks with improved pain control throughout the shift  Outcome: Progressing  Goal: Performs ADL's with improved pain control throughout shift  Outcome: Progressing  Goal: Participates in PT with improved pain control throughout the shift  Outcome: Progressing  Goal: Free from opioid side effects throughout the shift  Outcome: Progressing  Goal: Free from acute confusion related to pain meds throughout the shift  Outcome: Progressing     Problem: Nutrition  Goal: Oral intake greater 75%  Outcome: Progressing  Goal: Consume prescribed supplement  Outcome: Progressing  Goal: Adequate PO fluid intake  Outcome: Progressing  Goal: Lab values WNL  Outcome: Progressing  Goal: Electrolytes WNL  Outcome: Progressing  Goal: Gradual weight gain  Outcome: Progressing

## 2024-04-21 NOTE — CARE PLAN
The patient's goals for the shift include pain relief    The clinical goals for the shift include Pt's pain will be controlled and remain safe.    Over the shift, the patient was medicated for pain as ordered through shift.   at bedside.  Up with two assist to BSC.  Call light in reach.

## 2024-04-22 ENCOUNTER — APPOINTMENT (OUTPATIENT)
Dept: CARDIOLOGY | Facility: HOSPITAL | Age: 69
DRG: 180 | End: 2024-04-22
Payer: MEDICARE

## 2024-04-22 LAB
ANION GAP SERPL CALC-SCNC: 9 MMOL/L (ref 10–20)
BASOPHILS # BLD AUTO: 0.04 X10*3/UL (ref 0–0.1)
BASOPHILS NFR BLD AUTO: 0.4 %
BUN SERPL-MCNC: 15 MG/DL (ref 6–23)
CALCIUM SERPL-MCNC: 8.6 MG/DL (ref 8.6–10.3)
CARDIAC TROPONIN I PNL SERPL HS: 53 NG/L (ref 0–13)
CHLORIDE SERPL-SCNC: 104 MMOL/L (ref 98–107)
CO2 SERPL-SCNC: 25 MMOL/L (ref 21–32)
CREAT SERPL-MCNC: 0.42 MG/DL (ref 0.5–1.05)
EGFRCR SERPLBLD CKD-EPI 2021: >90 ML/MIN/1.73M*2
EOSINOPHIL # BLD AUTO: 0.2 X10*3/UL (ref 0–0.7)
EOSINOPHIL NFR BLD AUTO: 2 %
ERYTHROCYTE [DISTWIDTH] IN BLOOD BY AUTOMATED COUNT: 19.1 % (ref 11.5–14.5)
GLUCOSE SERPL-MCNC: 64 MG/DL (ref 74–99)
HCT VFR BLD AUTO: 31.4 % (ref 36–46)
HGB BLD-MCNC: 10.2 G/DL (ref 12–16)
HOLD SPECIMEN: NORMAL
IMM GRANULOCYTES # BLD AUTO: 0.26 X10*3/UL (ref 0–0.7)
IMM GRANULOCYTES NFR BLD AUTO: 2.6 % (ref 0–0.9)
LYMPHOCYTES # BLD AUTO: 0.85 X10*3/UL (ref 1.2–4.8)
LYMPHOCYTES NFR BLD AUTO: 8.4 %
MAGNESIUM SERPL-MCNC: 2.07 MG/DL (ref 1.6–2.4)
MCH RBC QN AUTO: 31.1 PG (ref 26–34)
MCHC RBC AUTO-ENTMCNC: 32.5 G/DL (ref 32–36)
MCV RBC AUTO: 96 FL (ref 80–100)
MONOCYTES # BLD AUTO: 0.56 X10*3/UL (ref 0.1–1)
MONOCYTES NFR BLD AUTO: 5.5 %
NEUTROPHILS # BLD AUTO: 8.24 X10*3/UL (ref 1.2–7.7)
NEUTROPHILS NFR BLD AUTO: 81.1 %
NRBC BLD-RTO: 0 /100 WBCS (ref 0–0)
PLATELET # BLD AUTO: 35 X10*3/UL (ref 150–450)
POTASSIUM SERPL-SCNC: 3.1 MMOL/L (ref 3.5–5.3)
RBC # BLD AUTO: 3.28 X10*6/UL (ref 4–5.2)
SODIUM SERPL-SCNC: 135 MMOL/L (ref 136–145)
WBC # BLD AUTO: 10.2 X10*3/UL (ref 4.4–11.3)

## 2024-04-22 PROCEDURE — 36415 COLL VENOUS BLD VENIPUNCTURE: CPT | Mod: IPSPLIT | Performed by: NURSE PRACTITIONER

## 2024-04-22 PROCEDURE — 2500000001 HC RX 250 WO HCPCS SELF ADMINISTERED DRUGS (ALT 637 FOR MEDICARE OP): Mod: IPSPLIT | Performed by: NURSE PRACTITIONER

## 2024-04-22 PROCEDURE — 2500000006 HC RX 250 W HCPCS SELF ADMINISTERED DRUGS (ALT 637 FOR ALL PAYERS): Mod: IPSPLIT | Performed by: NURSE PRACTITIONER

## 2024-04-22 PROCEDURE — 2500000004 HC RX 250 GENERAL PHARMACY W/ HCPCS (ALT 636 FOR OP/ED): Mod: IPSPLIT | Performed by: NURSE PRACTITIONER

## 2024-04-22 PROCEDURE — 84484 ASSAY OF TROPONIN QUANT: CPT | Mod: IPSPLIT

## 2024-04-22 PROCEDURE — 80048 BASIC METABOLIC PNL TOTAL CA: CPT | Mod: IPSPLIT | Performed by: NURSE PRACTITIONER

## 2024-04-22 PROCEDURE — 2500000004 HC RX 250 GENERAL PHARMACY W/ HCPCS (ALT 636 FOR OP/ED): Mod: IPSPLIT

## 2024-04-22 PROCEDURE — 85025 COMPLETE CBC W/AUTO DIFF WBC: CPT | Mod: IPSPLIT | Performed by: NURSE PRACTITIONER

## 2024-04-22 PROCEDURE — 99233 SBSQ HOSP IP/OBS HIGH 50: CPT | Performed by: NURSE PRACTITIONER

## 2024-04-22 PROCEDURE — 2500000005 HC RX 250 GENERAL PHARMACY W/O HCPCS: Mod: IPSPLIT | Performed by: NURSE PRACTITIONER

## 2024-04-22 PROCEDURE — 1200000002 HC GENERAL ROOM WITH TELEMETRY DAILY: Mod: IPSPLIT

## 2024-04-22 PROCEDURE — 83735 ASSAY OF MAGNESIUM: CPT | Mod: IPSPLIT | Performed by: NURSE PRACTITIONER

## 2024-04-22 RX ORDER — LIDOCAINE 560 MG/1
2 PATCH PERCUTANEOUS; TOPICAL; TRANSDERMAL DAILY
Status: DISCONTINUED | OUTPATIENT
Start: 2024-04-22 | End: 2024-04-25 | Stop reason: HOSPADM

## 2024-04-22 RX ORDER — GABAPENTIN 300 MG/1
300 CAPSULE ORAL 3 TIMES DAILY
Status: DISCONTINUED | OUTPATIENT
Start: 2024-04-22 | End: 2024-04-25 | Stop reason: HOSPADM

## 2024-04-22 RX ORDER — KETOROLAC TROMETHAMINE 15 MG/ML
15 INJECTION, SOLUTION INTRAMUSCULAR; INTRAVENOUS EVERY 6 HOURS PRN
Status: DISCONTINUED | OUTPATIENT
Start: 2024-04-22 | End: 2024-04-25 | Stop reason: HOSPADM

## 2024-04-22 RX ADMIN — POTASSIUM CHLORIDE 40 MEQ: 1500 TABLET, EXTENDED RELEASE ORAL at 09:00

## 2024-04-22 RX ADMIN — LIDOCAINE 4% 2 PATCH: 40 PATCH TOPICAL at 09:17

## 2024-04-22 RX ADMIN — HYDROMORPHONE HYDROCHLORIDE 2 MG: 2 TABLET ORAL at 17:20

## 2024-04-22 RX ADMIN — KETOROLAC TROMETHAMINE 15 MG: 15 INJECTION, SOLUTION INTRAMUSCULAR; INTRAVENOUS at 23:56

## 2024-04-22 RX ADMIN — HYDROMORPHONE HYDROCHLORIDE 2 MG: 2 TABLET ORAL at 21:49

## 2024-04-22 RX ADMIN — HYDROMORPHONE HYDROCHLORIDE 0.5 MG: 1 INJECTION, SOLUTION INTRAMUSCULAR; INTRAVENOUS; SUBCUTANEOUS at 08:49

## 2024-04-22 RX ADMIN — HYDROMORPHONE HYDROCHLORIDE 2 MG: 2 TABLET ORAL at 12:51

## 2024-04-22 RX ADMIN — HYDROMORPHONE HYDROCHLORIDE 0.5 MG: 1 INJECTION, SOLUTION INTRAMUSCULAR; INTRAVENOUS; SUBCUTANEOUS at 00:17

## 2024-04-22 RX ADMIN — GABAPENTIN 300 MG: 300 CAPSULE ORAL at 08:42

## 2024-04-22 RX ADMIN — GABAPENTIN 300 MG: 300 CAPSULE ORAL at 21:49

## 2024-04-22 RX ADMIN — HYDROMORPHONE HYDROCHLORIDE 16 MG: 16 TABLET, EXTENDED RELEASE ORAL at 07:45

## 2024-04-22 RX ADMIN — AMIODARONE HYDROCHLORIDE 400 MG: 200 TABLET ORAL at 08:42

## 2024-04-22 RX ADMIN — MAGNESIUM SULFATE IN WATER 2 G: 40 INJECTION, SOLUTION INTRAVENOUS at 08:49

## 2024-04-22 RX ADMIN — HYDROMORPHONE HYDROCHLORIDE 2 MG: 2 TABLET ORAL at 05:01

## 2024-04-22 RX ADMIN — KETOROLAC TROMETHAMINE 15 MG: 15 INJECTION, SOLUTION INTRAMUSCULAR; INTRAVENOUS at 00:16

## 2024-04-22 RX ADMIN — GABAPENTIN 300 MG: 300 CAPSULE ORAL at 14:53

## 2024-04-22 RX ADMIN — SENNOSIDES AND DOCUSATE SODIUM 2 TABLET: 8.6; 5 TABLET ORAL at 21:49

## 2024-04-22 RX ADMIN — PANTOPRAZOLE SODIUM 40 MG: 40 TABLET, DELAYED RELEASE ORAL at 06:08

## 2024-04-22 RX ADMIN — PREDNISONE 40 MG: 20 TABLET ORAL at 08:42

## 2024-04-22 ASSESSMENT — PAIN SCALES - GENERAL
PAINLEVEL_OUTOF10: 10 - WORST POSSIBLE PAIN
PAINLEVEL_OUTOF10: 7
PAINLEVEL_OUTOF10: 0 - NO PAIN
PAINLEVEL_OUTOF10: 7
PAINLEVEL_OUTOF10: 9
PAINLEVEL_OUTOF10: 10 - WORST POSSIBLE PAIN
PAINLEVEL_OUTOF10: 2
PAINLEVEL_OUTOF10: 10 - WORST POSSIBLE PAIN
PAINLEVEL_OUTOF10: 2
PAINLEVEL_OUTOF10: 2
PAINLEVEL_OUTOF10: 4
PAINLEVEL_OUTOF10: 10 - WORST POSSIBLE PAIN
PAINLEVEL_OUTOF10: 10 - WORST POSSIBLE PAIN

## 2024-04-22 ASSESSMENT — PAIN - FUNCTIONAL ASSESSMENT
PAIN_FUNCTIONAL_ASSESSMENT: 0-10

## 2024-04-22 ASSESSMENT — COGNITIVE AND FUNCTIONAL STATUS - GENERAL
STANDING UP FROM CHAIR USING ARMS: A LOT
PERSONAL GROOMING: TOTAL
MOVING TO AND FROM BED TO CHAIR: A LOT
MOVING TO AND FROM BED TO CHAIR: A LOT
MOBILITY SCORE: 11
EATING MEALS: A LOT
WALKING IN HOSPITAL ROOM: A LOT
TOILETING: TOTAL
DRESSING REGULAR LOWER BODY CLOTHING: TOTAL
STANDING UP FROM CHAIR USING ARMS: A LOT
HELP NEEDED FOR BATHING: TOTAL
DRESSING REGULAR UPPER BODY CLOTHING: TOTAL
TOILETING: TOTAL
TURNING FROM BACK TO SIDE WHILE IN FLAT BAD: A LOT
PERSONAL GROOMING: TOTAL
TURNING FROM BACK TO SIDE WHILE IN FLAT BAD: A LOT
DRESSING REGULAR UPPER BODY CLOTHING: TOTAL
DRESSING REGULAR LOWER BODY CLOTHING: TOTAL
CLIMB 3 TO 5 STEPS WITH RAILING: TOTAL
MOVING FROM LYING ON BACK TO SITTING ON SIDE OF FLAT BED WITH BEDRAILS: A LOT
DAILY ACTIVITIY SCORE: 7
CLIMB 3 TO 5 STEPS WITH RAILING: TOTAL
HELP NEEDED FOR BATHING: TOTAL
WALKING IN HOSPITAL ROOM: A LOT
DAILY ACTIVITIY SCORE: 7
EATING MEALS: A LOT
MOBILITY SCORE: 11
MOVING FROM LYING ON BACK TO SITTING ON SIDE OF FLAT BED WITH BEDRAILS: A LOT

## 2024-04-22 ASSESSMENT — PAIN DESCRIPTION - LOCATION
LOCATION: ABDOMEN
LOCATION: GENERALIZED

## 2024-04-22 NOTE — PROGRESS NOTES
Physical Therapy                 Therapy Communication Note    Patient Name: Malathi Rubio  MRN: [id number]  Today's Date: 4/22/2024     Discipline: Physical Therapy    Missed Visit Reason: Missed Visit Reason: Other (Comment) (Attempted PT, nurse states that patient is in too much pain to tolerate at this time. Will inform supervising therapist.)    Missed Time: Attempt 2111

## 2024-04-22 NOTE — CARE PLAN
The patient's goals for the shift include pain relief    The clinical goals for the shift include Pain will be controlled.  Pt will be safe and free from falls.    Over the shift, the patient rest quietly after 1x Dilaudid given.  Assisted x 2 to BSC.   at bedside.

## 2024-04-22 NOTE — CARE PLAN
Problem: Pain  Goal: My pain/discomfort is manageable  Outcome: Not Progressing   The patient's goals for the shift include pain relief    The clinical goals for the shift include control pain with pain medication    Goal not met;. Pain is constant. Pain medications given as ordered. Vss. Hospice nurse had consult with patient and spouse today. No decision was made. Spouse is at bedside for most all of the day.    Problem: Pain  Goal: My pain/discomfort is manageable  Outcome: Not Progressing     Problem: Daily Care  Goal: Daily care needs are met  Outcome: Not Progressing     Problem: Psychosocial Needs  Goal: Demonstrates ability to cope with hospitalization/illness  Outcome: Not Progressing     Problem: Discharge Barriers  Goal: My discharge needs are met  Outcome: Not Progressing     Problem: Pain - Adult  Goal: Verbalizes/displays adequate comfort level or baseline comfort level  Outcome: Not Progressing     Problem: Safety - Adult  Goal: Free from fall injury  Outcome: Not Progressing     Problem: Discharge Planning  Goal: Discharge to home or other facility with appropriate resources  Outcome: Not Progressing     Problem: Chronic Conditions and Co-morbidities  Goal: Patient's chronic conditions and co-morbidity symptoms are monitored and maintained or improved  Outcome: Not Progressing     Problem: Pain  Goal: Takes deep breaths with improved pain control throughout the shift  Outcome: Not Progressing  Goal: Turns in bed with improved pain control throughout the shift  Outcome: Not Progressing  Goal: Walks with improved pain control throughout the shift  Outcome: Not Progressing  Goal: Performs ADL's with improved pain control throughout shift  Outcome: Not Progressing  Goal: Participates in PT with improved pain control throughout the shift  Outcome: Not Progressing  Goal: Free from opioid side effects throughout the shift  Outcome: Not Progressing  Goal: Free from acute confusion related to pain meds  throughout the shift  Outcome: Not Progressing     Problem: Nutrition  Goal: Oral intake greater 75%  Outcome: Not Progressing  Goal: Consume prescribed supplement  Outcome: Not Progressing  Goal: Adequate PO fluid intake  Outcome: Not Progressing  Goal: Lab values WNL  Outcome: Not Progressing  Goal: Electrolytes WNL  Outcome: Not Progressing  Goal: Gradual weight gain  Outcome: Not Progressing

## 2024-04-22 NOTE — PROGRESS NOTES
9:30 AM Referral sent to [facility].  [facility] will reach out to spouse to schedule appt.    11:30 AM  Updated Hospice with 's cell phone.  Meeting pending communication between [facility] and Andrew, spouse.      3:00 PM  Per Yvonne Schmidt with [facility], patient and  Andrew want to discuss signing on with [facility] this evening and give decision tomorrow.  The Hospice level of care is the right fit for her, per Yvonne.  Patient could go to the [facility] for pain management before going back home with Hospice.   is overwhelmed with decision making.  Will re-discuss in the morning with Andrew and patient.  also has a stress test scheduled tomorrow @ 11 am.      [facility] informed [facility] that patient will not be going to SNF at this time.  Authorization for [facility] expires at midnight.  Updated medical team.  TCC following.

## 2024-04-22 NOTE — PROGRESS NOTES
Occupational Therapy  Therapy Communication Note    Patient Name: Malathi Rubio  MRN: [id number]  Today's Date: 4/22/2024     Discipline: Occupational Therapy    Missed Visit Reason: Missed Visit Reason: Patient placed on medical hold (RN deferred OT intervention today d/t experiencing uncontrollable pain at this time.)    Missed Time: Attempt 0843

## 2024-04-22 NOTE — NURSING NOTE
2330-Medicated with Tylenol as ordered.  Pt c/o generalized pain, also c/o chest pain.  at bedside requesting to see NP.    2335-NP notified for request to see pt.      2352: New orders given per NP.  EKG done showing NSR.

## 2024-04-22 NOTE — NURSING NOTE
0802 CALE Pinon NP aware of critical lab value, platelet 35.    0839 CALE Pinon NP aware of critical troponin of 53.

## 2024-04-22 NOTE — PROGRESS NOTES
"Malathi Rubio is a 68 y.o. female on day 6 of admission presenting with Syncope.    Subjective   She is in bed this morning, moaning and crying in pain. Her  is at bedside, tearful and concerned for her pain. He states she has been like this throughout the night. We had a jason discussion regarding Hospice care versus rehab facility versus home care for rehab. He and I agreed she would not be successful with PT/OT in her current state. Hospice was consulted, he will speak more in depth with them when they call.      Objective     Physical Exam  Constitutional:       General: She is not in acute distress.     Appearance: She is ill-appearing.   HENT:      Head: Normocephalic and atraumatic.      Mouth/Throat:      Mouth: Mucous membranes are moist.   Eyes:      Conjunctiva/sclera: Conjunctivae normal.   Cardiovascular:      Rate and Rhythm: Normal rate. Rhythm irregular.      Heart sounds: No murmur heard.  Pulmonary:      Effort: No respiratory distress.      Breath sounds: No wheezing.   Abdominal:      General: There is no distension.      Palpations: Abdomen is soft.      Tenderness: There is no abdominal tenderness.   Musculoskeletal:         General: No swelling.   Skin:     General: Skin is warm and dry.      Coloration: Skin is pale.   Neurological:      Mental Status: She is alert and oriented to person, place, and time.   Psychiatric:         Mood and Affect: Mood normal.         Behavior: Behavior normal.     Last Recorded Vitals  Blood pressure 171/82, pulse 74, temperature 36.7 °C (98.1 °F), temperature source Temporal, resp. rate 20, height 1.651 m (5' 5\"), weight 61.8 kg (136 lb 3.9 oz), SpO2 95%.  Intake/Output last 3 Shifts:  I/O last 3 completed shifts:  In: 6712.9 (108.6 mL/kg) [I.V.:360.4 (5.8 mL/kg); Blood:6352.5]  Out: 200 (3.2 mL/kg) [Urine:200 (0.1 mL/kg/hr)]  Weight: 61.8 kg     Scheduled medications  amiodarone, 400 mg, oral, Daily   Followed by  [START ON 5/1/2024] amiodarone, 200 " mg, oral, Daily  gabapentin, 300 mg, oral, BID  HYDROmorphone, 16 mg, oral, q24h CHEIKH  lidocaine, 2 patch, transdermal, Daily  magnesium sulfate, 2 g, intravenous, Daily  oxygen, , inhalation, Continuous - Inhalation  pantoprazole, 40 mg, oral, Daily before breakfast  polyethylene glycol, 17 g, oral, Daily  potassium chloride CR, 40 mEq, oral, Daily  predniSONE, 40 mg, oral, Daily  sennosides-docusate sodium, 2 tablet, oral, BID    Continuous medications  sodium chloride 0.9%, 10 mL/hr    PRN medications  PRN medications: acetaminophen, acetaminophen, alum-mag hydroxide-simeth, benzocaine-menthol, dextromethorphan-guaifenesin, guaiFENesin, HYDROmorphone, ipratropium-albuteroL, ketorolac, methocarbamol, metoprolol, ondansetron, ondansetron **OR** [DISCONTINUED] ondansetron, sodium chloride 0.9%    Relevant Results:  MR brain w and wo IV contrast  Result Date: 4/19/2024  1. Limited, motion degraded examination. There are several foci of abnormal signal on diffusion-weighted imaging within the right frontal lobe into a lesser extent the bilateral parietal lobes suspicious for areas of acute to subacute ischemic injury. The distribution raises the possibility of an embolic process. 2. There is curvilinear signal abnormality on a single FLAIR image within right frontal sulci which could be due to artifact. However, subarachnoid hemorrhage or proteinaceous material could also give this appearance and follow-up head CT is recommended. 3. Dural-based enhancing mass overlying the left frontal lobe is nonspecific but could represent a meningioma although given the patient's history dural metastasis is difficult to entirely exclude. 4. Nonspecific white matter changes most likely reflect small-vessel ischemic disease in a patient of this age and also involve the vicki. Small areas of remote ischemic injury are also suspected in the cerebellum.       MACRO: Critical Finding:  See findings. Notification was initiated on 4/19/2024  at 1:51 pm by  Opal Seo.  (**-OCF-**)   Signed by: Opal Seo 4/19/2024 1:53 PM Dictation workstation:   [id number]    Electrocardiogram, 12-lead PRN ACS symptoms  Result Date: 4/17/2024  Atrial fibrillation with rapid ventricular response Nonspecific ST abnormality    ECG 12 lead  Result Date: 4/16/2024  Sinus tachycardia with occasional Premature ventricular complexes Nonspecific ST and T wave abnormality     CT chest abdomen pelvis w IV contrast  Result Date: 4/13/2024  There is a large necrotic cavitary mass in the right posterior thorax measuring 5.1 x 6.6 x 8.3 cm. There is osseous destruction and pathologic fracture of the posterior 5th and 6th ribs. A 1.8 x 2.7 cm spiculated nodule is noted in the left lung apex. Findings likely relate to patient's provided history of carcinoma. Correlate with prior workup.   Advanced centrilobular and paraseptal emphysema noted. Moderate right pleural effusion with areas of loculation. Adjacent airspace opacity favored to relate to compressive atelectasis. Superimposed infection not excluded. Correlate clinically.   There is a large heterogeneous hypoattenuating subcarinal mass measuring 4.3 x 4.2 cm with significant mass-effect on the esophagus with loss of fat planes with the esophagus. This is concerning for necrotic lymph node with local invasion not excluded.   There is a 2 cm heterogeneous hypodense nodule in the left adrenal gland concerning for metastases.   There is a 5.5 x 6.9 x 5.2 cm soft tissue mass in the left scapula with osseous destruction of the scapular body, incompletely imaged. There is diffuse scattered sclerotic lesions throughout the axial and appendicular skeletal system consistent with osseous metastases.   Additional findings as described above.     MACRO: None   Signed by: Clau Mcdonald 4/13/2024 11:10 PM Dictation workstation:   [id number]    CT head wo IV contrast  Result Date: 4/13/2024  No acute intracranial abnormality.    Chronic changes as noted above.   MACRO: None   Signed by: Clau Mcdonald 4/13/2024 10:46 PM Dictation workstation:   [id number]    Assessment/Plan   Principal Problem:    Syncope  Active Problems:    Acute pain    Small cell lung cancer (Multi)    Malignant neoplasm metastatic to bone (Multi)    COPD (chronic obstructive pulmonary disease) (Multi)    Acute on chronic anemia    Community acquired bacterial pneumonia    Malnutrition (Multi)    PUD (peptic ulcer disease)    Thrombocytopenia (CMS-HCC)    Mixed hyperlipidemia    #Lung cancer with metastases to bone, brain, liver  #COPD, in acute exacerbation 2/2 pneumonia, bacterial   #Acute hypoxic respiratory failure (improving)   - Patient follows with hem/onc at [place]  - No leukocytosis   - COVID, flu, RSV negative    - Procal 0.43   - BCx negative x 3 days   - CT chest with large necrotic cavitary mass in the right posterior thorax with osseous destruction; advanced emphysema, airspace opacity favored to represent compressive atelectasis    - Continue Zosyn (day 6), transition to augmentin today (day 4)  - Continue Solumedrol 40 mg IVP q12h, transition to oral prednisone  - Home hydromorphone brought in for pain 2/2 metastases; continue hydromorphone scheduled and PRN   - DuoNebs q4h PRN   - Mucinex BID PRN   - Tylenol PRN for fever   - RT eval and treat protocol, Bronchial hygiene  - Monitor SpO2 continuously, baseline O2 None   - Wean O2 as tolerated; patient currently on 2L O2 via NC overnight    - Pulmonology and hematology/oncology consulted, appreciate recs   - Code status discussion completed with patient and spouse 4/17; now DNRCCA   - MRI Brain 4/19: There are several foci of abnormal signal on diffusion-weighted imaging within the right frontal lobe into a lesser extent the bilateral parietal lobes suspicious for areas of acute to subacute ischemic injury. The distribution raises the possibility of an embolic process. There is curvilinear signal  abnormality on a single FLAIR image within right frontal sulci which could be due to artifact. However, subarachnoid hemorrhage or proteinaceous material could also give this appearance and follow-up head CT is recommended. Dural-based enhancing mass overlying the left frontal lobe is nonspecific but could represent a meningioma although given the patient's history dural metastasis is difficult to entirely exclude. 4. Nonspecific white matter changes most likely reflect small-vessel ischemic disease in a patient of this age and also involve the vicki. Small areas of remote ischemic injury are also suspected in the cerebellum.    - Neuro on call consulted with to review results, further recommendations, etc.. (adding echocardiogram)     #Hypokalemia (improving)   #Hypomagnesemia (resolved)  - K 3.0 > 2.8 > 3.3 > 3.1  - Mag 1.78 > 2.20 > 2.07  - Telemetry monitoring   - Encourage PO intake as tolerated  - Daily BMP and mag level  - replete as needed     #AF RVR (improving)   #Non-MI elevated troponins   - Received Lopressor 5 mg IVP x 1 dose with improvement in HR   - Currently AF rate 100s   - Trop  98 > 92 > 80 > 154 > 75 > 68, patient denies chest pain   - Telemetry monitoring  - Not on anticoagulation due to anemia  - Cardiology consulted, start amiodarone 400 mg PO every day x 2 weeks then 200 mg PO every day   - PRN metoprolol as needed for HR control     #Syncope (resolved)  #Acute on chronic anemia (improving)   #Thrombocytopenia (improving)   - Patient had syncopal episode at home while on the toilet; has had poor oral intake at home   - CT head with no acute intracranial abnormality  - H/H 11.9/36.3 > 11.6/36.3 > 10.7/34.4 > 11.2/33.6 > 10.2/31.4  - plts 82 > 80 > 49 > 59 > 35  - Anticoagulation/DVT ppx held  - Patient received 1 unit pRBCs in the ED and again 4/17   - B12 1232, folate 12.9, iron 22, ferritin 2540, % saturation not calculated  - Encourage PO intake as tolerated  - Telemetry monitoring  -  Daily CBC      #Malnutrition 2/2 active malignancy  - Albumin 2.4   - Regular diet ordered  - Nutrition consulted, appreciate recs  -Malnutrition Diagnosis Status: New  Malnutrition Diagnosis: Severe malnutrition related to chronic disease or condition  As Evidenced by: significant unintentional weight loss of 8.9% x 3 months and 16.7% x 6 months; poor nutritional intake meeting less than 75% of estimated needs for greater than or equal to one month.  I agree with the dietitian's malnutrition diagnosis.     DVT PPx: SCDs   GI PPx: Protonix  Diet: Regular  Code Status: DNRCCA/DNI- discussed with patient and spouse 4/17      Disposition: Patient requires inpatient management at this time.    Total accumulated time spent face to face and not face to face preparing to see the patient, obtaining and reviewing separately obtained history; performing a medically appropriate examination and/or evaluation; counseling and educating the patient, family; ordering medications, tests, or procedures; referring and communicating with other health care professionals; documenting clinical information in the patient's medical record; independently interpreting results and communicating the results to the patient, family; and care coordination was 35 minutes.      Amrita Pinon, LUIS-CNP

## 2024-04-22 NOTE — PROGRESS NOTES
Occupational Therapy  Therapy Communication Note    Patient Name: Malathi Rubio  MRN: [id number]  Today's Date: 4/22/2024     Discipline: Occupational Therapy    Missed Visit Reason: Missed Visit Reason: Patient placed on medical hold (Rechecked with nursing on pt.'s status. RN rec no treatment at this time d/t continued pain. Notified team during IDT.)    Missed Time: Attempt 0950

## 2024-04-22 NOTE — CARE PLAN
The patient's goals for the shift include pain relief    The clinical goals for the shift include control pain with pain medication

## 2024-04-23 PROBLEM — R09.89 RIGHT CAROTID BRUIT: Status: ACTIVE | Noted: 2021-11-03

## 2024-04-23 PROBLEM — C34.90 MALIGNANT NEOPLASM OF LUNG (MULTI): Status: ACTIVE | Noted: 2024-04-23

## 2024-04-23 PROBLEM — M79.605 BILATERAL LEG AND FOOT PAIN: Status: ACTIVE | Noted: 2024-04-23

## 2024-04-23 PROBLEM — G56.00 CARPAL TUNNEL SYNDROME: Status: ACTIVE | Noted: 2024-04-23

## 2024-04-23 PROBLEM — M79.672 BILATERAL LEG AND FOOT PAIN: Status: ACTIVE | Noted: 2024-04-23

## 2024-04-23 PROBLEM — R59.0 SUPRACLAVICULAR ADENOPATHY: Status: ACTIVE | Noted: 2023-06-23

## 2024-04-23 PROBLEM — M79.671 BILATERAL LEG AND FOOT PAIN: Status: ACTIVE | Noted: 2024-04-23

## 2024-04-23 PROBLEM — J43.2 CENTRILOBULAR EMPHYSEMA (MULTI): Status: ACTIVE | Noted: 2023-06-23

## 2024-04-23 PROBLEM — B35.1 MYCOTIC TOENAILS: Status: ACTIVE | Noted: 2024-04-23

## 2024-04-23 PROBLEM — J98.59 MEDIASTINAL MASS: Status: ACTIVE | Noted: 2023-06-22

## 2024-04-23 PROBLEM — E86.0 DEHYDRATION: Status: ACTIVE | Noted: 2024-04-10

## 2024-04-23 PROBLEM — R74.8 ELEVATED LIVER ENZYMES: Status: ACTIVE | Noted: 2021-11-03

## 2024-04-23 PROBLEM — F17.200 NICOTINE USE DISORDER: Status: ACTIVE | Noted: 2023-06-23

## 2024-04-23 PROBLEM — R79.89 ABNORMAL LFTS: Status: ACTIVE | Noted: 2023-07-03

## 2024-04-23 PROBLEM — F17.219 CIGARETTE NICOTINE DEPENDENCE WITH NICOTINE-INDUCED DISORDER: Status: ACTIVE | Noted: 2023-06-23

## 2024-04-23 PROBLEM — R59.0 HILAR ADENOPATHY: Status: ACTIVE | Noted: 2023-06-23

## 2024-04-23 PROBLEM — C34.91: Status: ACTIVE | Noted: 2023-07-05

## 2024-04-23 PROBLEM — R59.0 MEDIASTINAL ADENOPATHY: Status: ACTIVE | Noted: 2023-07-05

## 2024-04-23 PROBLEM — J90 PLEURAL EFFUSION, RIGHT: Status: ACTIVE | Noted: 2023-07-03

## 2024-04-23 PROBLEM — G12.29: Status: ACTIVE | Noted: 2024-04-23

## 2024-04-23 PROBLEM — M79.606 PAIN OF LOWER EXTREMITY: Status: ACTIVE | Noted: 2024-04-23

## 2024-04-23 PROBLEM — R29.898 WEAKNESS OF RIGHT UPPER EXTREMITY: Status: ACTIVE | Noted: 2024-04-23

## 2024-04-23 PROBLEM — J98.8 NARROWING OF AIRWAY: Status: ACTIVE | Noted: 2024-03-17

## 2024-04-23 PROBLEM — E87.1 HYPONATREMIA: Status: ACTIVE | Noted: 2023-07-03

## 2024-04-23 PROBLEM — M79.604 BILATERAL LEG AND FOOT PAIN: Status: ACTIVE | Noted: 2024-04-23

## 2024-04-23 PROBLEM — B35.1 ONYCHOMYCOSIS OF TOENAIL: Status: ACTIVE | Noted: 2024-04-23

## 2024-04-23 PROBLEM — M25.511 RIGHT SHOULDER PAIN: Status: ACTIVE | Noted: 2024-04-23

## 2024-04-23 LAB
ANION GAP SERPL CALC-SCNC: 11 MMOL/L (ref 10–20)
BASOPHILS # BLD AUTO: 0.03 X10*3/UL (ref 0–0.1)
BASOPHILS NFR BLD AUTO: 0.3 %
BUN SERPL-MCNC: 13 MG/DL (ref 6–23)
CALCIUM SERPL-MCNC: 8.3 MG/DL (ref 8.6–10.3)
CHLORIDE SERPL-SCNC: 103 MMOL/L (ref 98–107)
CO2 SERPL-SCNC: 24 MMOL/L (ref 21–32)
CREAT SERPL-MCNC: 0.44 MG/DL (ref 0.5–1.05)
EGFRCR SERPLBLD CKD-EPI 2021: >90 ML/MIN/1.73M*2
EOSINOPHIL # BLD AUTO: 0 X10*3/UL (ref 0–0.7)
EOSINOPHIL NFR BLD AUTO: 0 %
ERYTHROCYTE [DISTWIDTH] IN BLOOD BY AUTOMATED COUNT: 19 % (ref 11.5–14.5)
GLUCOSE SERPL-MCNC: 92 MG/DL (ref 74–99)
HCT VFR BLD AUTO: 30.4 % (ref 36–46)
HGB BLD-MCNC: 10.4 G/DL (ref 12–16)
IMM GRANULOCYTES # BLD AUTO: 0.46 X10*3/UL (ref 0–0.7)
IMM GRANULOCYTES NFR BLD AUTO: 4.9 % (ref 0–0.9)
LYMPHOCYTES # BLD AUTO: 0.45 X10*3/UL (ref 1.2–4.8)
LYMPHOCYTES NFR BLD AUTO: 4.8 %
MAGNESIUM SERPL-MCNC: 2.05 MG/DL (ref 1.6–2.4)
MCH RBC QN AUTO: 31.9 PG (ref 26–34)
MCHC RBC AUTO-ENTMCNC: 34.2 G/DL (ref 32–36)
MCV RBC AUTO: 93 FL (ref 80–100)
MONOCYTES # BLD AUTO: 0.18 X10*3/UL (ref 0.1–1)
MONOCYTES NFR BLD AUTO: 1.9 %
NEUTROPHILS # BLD AUTO: 8.27 X10*3/UL (ref 1.2–7.7)
NEUTROPHILS NFR BLD AUTO: 88.1 %
NRBC BLD-RTO: 0 /100 WBCS (ref 0–0)
PLATELET # BLD AUTO: 37 X10*3/UL (ref 150–450)
POTASSIUM SERPL-SCNC: 3.3 MMOL/L (ref 3.5–5.3)
RBC # BLD AUTO: 3.26 X10*6/UL (ref 4–5.2)
SODIUM SERPL-SCNC: 135 MMOL/L (ref 136–145)
WBC # BLD AUTO: 9.4 X10*3/UL (ref 4.4–11.3)

## 2024-04-23 PROCEDURE — 2500000004 HC RX 250 GENERAL PHARMACY W/ HCPCS (ALT 636 FOR OP/ED): Mod: IPSPLIT | Performed by: NURSE PRACTITIONER

## 2024-04-23 PROCEDURE — 2500000006 HC RX 250 W HCPCS SELF ADMINISTERED DRUGS (ALT 637 FOR ALL PAYERS): Mod: IPSPLIT

## 2024-04-23 PROCEDURE — 1100000001 HC PRIVATE ROOM DAILY: Mod: IPSPLIT

## 2024-04-23 PROCEDURE — 2500000005 HC RX 250 GENERAL PHARMACY W/O HCPCS: Mod: IPSPLIT | Performed by: NURSE PRACTITIONER

## 2024-04-23 PROCEDURE — 85025 COMPLETE CBC W/AUTO DIFF WBC: CPT | Mod: IPSPLIT | Performed by: NURSE PRACTITIONER

## 2024-04-23 PROCEDURE — 80048 BASIC METABOLIC PNL TOTAL CA: CPT | Mod: IPSPLIT | Performed by: NURSE PRACTITIONER

## 2024-04-23 PROCEDURE — 99233 SBSQ HOSP IP/OBS HIGH 50: CPT

## 2024-04-23 PROCEDURE — 2500000001 HC RX 250 WO HCPCS SELF ADMINISTERED DRUGS (ALT 637 FOR MEDICARE OP): Mod: IPSPLIT | Performed by: NURSE PRACTITIONER

## 2024-04-23 PROCEDURE — 36415 COLL VENOUS BLD VENIPUNCTURE: CPT | Mod: IPSPLIT | Performed by: NURSE PRACTITIONER

## 2024-04-23 PROCEDURE — 2500000006 HC RX 250 W HCPCS SELF ADMINISTERED DRUGS (ALT 637 FOR ALL PAYERS): Mod: IPSPLIT | Performed by: NURSE PRACTITIONER

## 2024-04-23 PROCEDURE — 83735 ASSAY OF MAGNESIUM: CPT | Mod: IPSPLIT | Performed by: NURSE PRACTITIONER

## 2024-04-23 RX ORDER — BLOOD SUGAR DIAGNOSTIC
STRIP MISCELLANEOUS
COMMUNITY
Start: 2024-03-23

## 2024-04-23 RX ORDER — DOXYCYCLINE 100 MG/1
CAPSULE ORAL
COMMUNITY
Start: 2023-12-04

## 2024-04-23 RX ORDER — PREDNISONE 50 MG/1
50 TABLET ORAL 2 TIMES DAILY
COMMUNITY
Start: 2024-01-12 | End: 2024-04-25 | Stop reason: HOSPADM

## 2024-04-23 RX ORDER — FAMOTIDINE 20 MG/1
TABLET, FILM COATED ORAL
COMMUNITY
Start: 2024-02-26

## 2024-04-23 RX ORDER — CAPSAICIN 0.03 G/100G
CREAM TOPICAL EVERY 8 HOURS PRN
COMMUNITY
Start: 2024-03-23

## 2024-04-23 RX ORDER — PREDNISONE 10 MG/1
TABLET ORAL
COMMUNITY
Start: 2024-01-18 | End: 2024-04-25 | Stop reason: HOSPADM

## 2024-04-23 RX ORDER — POTASSIUM CHLORIDE 20 MEQ/1
20 TABLET, EXTENDED RELEASE ORAL 2 TIMES DAILY
COMMUNITY
Start: 2024-01-09 | End: 2024-04-25 | Stop reason: HOSPADM

## 2024-04-23 RX ORDER — ROSUVASTATIN CALCIUM 10 MG/1
TABLET, COATED ORAL
COMMUNITY
Start: 2023-11-24

## 2024-04-23 RX ORDER — GUAIFENESIN 600 MG/1
TABLET, EXTENDED RELEASE ORAL
COMMUNITY
Start: 2023-07-08

## 2024-04-23 RX ORDER — MUPIROCIN 20 MG/G
OINTMENT TOPICAL
COMMUNITY
Start: 2024-02-13

## 2024-04-23 RX ORDER — OLANZAPINE 5 MG/1
TABLET ORAL
COMMUNITY
Start: 2023-07-14

## 2024-04-23 RX ORDER — NICOTINE 11MG/24HR
PATCH, TRANSDERMAL 24 HOURS TRANSDERMAL DAILY
COMMUNITY
Start: 2023-06-08

## 2024-04-23 RX ORDER — DEXTROSE 50 % IN WATER (D50W) INTRAVENOUS SYRINGE
12.5
Status: DISCONTINUED | OUTPATIENT
Start: 2024-04-23 | End: 2024-04-25 | Stop reason: HOSPADM

## 2024-04-23 RX ORDER — LANOLIN ALCOHOL/MO/W.PET/CERES
400 CREAM (GRAM) TOPICAL 2 TIMES DAILY PRN
Status: DISCONTINUED | OUTPATIENT
Start: 2024-04-23 | End: 2024-04-25 | Stop reason: HOSPADM

## 2024-04-23 RX ORDER — CLOTRIMAZOLE 10 MG/1
LOZENGE ORAL; TOPICAL
COMMUNITY
Start: 2023-07-17

## 2024-04-23 RX ORDER — OXYCODONE HYDROCHLORIDE 5 MG/1
TABLET ORAL
COMMUNITY
Start: 2024-02-29

## 2024-04-23 RX ORDER — DIPHENOXYLATE HYDROCHLORIDE AND ATROPINE SULFATE 2.5; .025 MG/1; MG/1
TABLET ORAL
COMMUNITY
Start: 2024-01-12

## 2024-04-23 RX ORDER — LEVOFLOXACIN 750 MG/1
TABLET ORAL
COMMUNITY
Start: 2024-03-27

## 2024-04-23 RX ORDER — BUDESONIDE 3 MG/1
3 CAPSULE, COATED PELLETS ORAL 3 TIMES DAILY
COMMUNITY
Start: 2024-01-27

## 2024-04-23 RX ORDER — DEXAMETHASONE 4 MG/1
TABLET ORAL
COMMUNITY
Start: 2024-02-26

## 2024-04-23 RX ORDER — DICLOFENAC SODIUM 10 MG/G
2 GEL TOPICAL EVERY 6 HOURS PRN
COMMUNITY
Start: 2024-03-23

## 2024-04-23 RX ORDER — LANCETS
EACH MISCELLANEOUS
COMMUNITY
Start: 2024-03-23

## 2024-04-23 RX ORDER — POTASSIUM CHLORIDE 1.5 G/1.58G
40 POWDER, FOR SOLUTION ORAL ONCE
Status: DISCONTINUED | OUTPATIENT
Start: 2024-04-23 | End: 2024-04-23

## 2024-04-23 RX ORDER — POTASSIUM CHLORIDE 20 MEQ/1
40 TABLET, EXTENDED RELEASE ORAL ONCE
Status: COMPLETED | OUTPATIENT
Start: 2024-04-23 | End: 2024-04-23

## 2024-04-23 RX ORDER — PANTOPRAZOLE SODIUM 40 MG/1
40 TABLET, DELAYED RELEASE ORAL DAILY
COMMUNITY
Start: 2024-01-02

## 2024-04-23 RX ORDER — CHOLECALCIFEROL (VITAMIN D3) 25 MCG
TABLET ORAL
COMMUNITY
Start: 2024-03-23

## 2024-04-23 RX ORDER — LANCETS 33 GAUGE
EACH MISCELLANEOUS
COMMUNITY
Start: 2023-06-08

## 2024-04-23 RX ORDER — CARBAMIDE PEROXIDE 6.5 %
DROPS OTIC (EAR)
COMMUNITY
Start: 2024-04-09

## 2024-04-23 RX ORDER — LOSARTAN POTASSIUM 50 MG/1
TABLET ORAL
COMMUNITY
Start: 2023-06-11

## 2024-04-23 RX ORDER — UMECLIDINIUM BROMIDE AND VILANTEROL TRIFENATATE 62.5; 25 UG/1; UG/1
POWDER RESPIRATORY (INHALATION)
COMMUNITY
Start: 2023-08-03

## 2024-04-23 RX ORDER — LACTULOSE 10 G/15ML
SOLUTION ORAL; RECTAL
COMMUNITY
Start: 2024-02-23

## 2024-04-23 RX ORDER — LOPERAMIDE HYDROCHLORIDE 2 MG/1
CAPSULE ORAL
COMMUNITY
Start: 2024-01-02

## 2024-04-23 RX ORDER — NYSTATIN 100000 [USP'U]/ML
SUSPENSION ORAL
COMMUNITY
Start: 2024-03-27

## 2024-04-23 RX ORDER — SIMETHICONE 80 MG
TABLET,CHEWABLE ORAL
COMMUNITY
Start: 2024-01-10

## 2024-04-23 RX ORDER — SULFAMETHOXAZOLE AND TRIMETHOPRIM 800; 160 MG/1; MG/1
TABLET ORAL
COMMUNITY
Start: 2024-01-18

## 2024-04-23 RX ORDER — NYSTATIN 100000 [USP'U]/ML
5 SUSPENSION ORAL 4 TIMES DAILY
COMMUNITY
Start: 2024-03-27

## 2024-04-23 RX ORDER — DEXTROSE 50 % IN WATER (D50W) INTRAVENOUS SYRINGE
25
Status: DISCONTINUED | OUTPATIENT
Start: 2024-04-23 | End: 2024-04-25 | Stop reason: HOSPADM

## 2024-04-23 RX ORDER — LANCETS 30 GAUGE
EACH MISCELLANEOUS
COMMUNITY
Start: 2023-06-08

## 2024-04-23 RX ORDER — AMLODIPINE BESYLATE 5 MG/1
5 TABLET ORAL DAILY
COMMUNITY
Start: 2023-11-24

## 2024-04-23 RX ORDER — FUROSEMIDE 20 MG/1
TABLET ORAL
COMMUNITY
Start: 2023-07-08

## 2024-04-23 RX ADMIN — HYDROMORPHONE HYDROCHLORIDE 2 MG: 2 TABLET ORAL at 06:17

## 2024-04-23 RX ADMIN — PANTOPRAZOLE SODIUM 40 MG: 40 TABLET, DELAYED RELEASE ORAL at 06:17

## 2024-04-23 RX ADMIN — HYDROMORPHONE HYDROCHLORIDE 2 MG: 2 TABLET ORAL at 14:07

## 2024-04-23 RX ADMIN — GABAPENTIN 300 MG: 300 CAPSULE ORAL at 21:18

## 2024-04-23 RX ADMIN — SENNOSIDES AND DOCUSATE SODIUM 2 TABLET: 8.6; 5 TABLET ORAL at 21:18

## 2024-04-23 RX ADMIN — HYDROMORPHONE HYDROCHLORIDE 2 MG: 2 TABLET ORAL at 18:13

## 2024-04-23 RX ADMIN — AMIODARONE HYDROCHLORIDE 400 MG: 200 TABLET ORAL at 08:50

## 2024-04-23 RX ADMIN — PREDNISONE 40 MG: 20 TABLET ORAL at 08:50

## 2024-04-23 RX ADMIN — HYDROMORPHONE HYDROCHLORIDE 2 MG: 2 TABLET ORAL at 22:25

## 2024-04-23 RX ADMIN — LIDOCAINE 4% 2 PATCH: 40 PATCH TOPICAL at 08:50

## 2024-04-23 RX ADMIN — GABAPENTIN 300 MG: 300 CAPSULE ORAL at 08:45

## 2024-04-23 RX ADMIN — POTASSIUM CHLORIDE 40 MEQ: 1500 TABLET, EXTENDED RELEASE ORAL at 09:12

## 2024-04-23 RX ADMIN — HYDROMORPHONE HYDROCHLORIDE 16 MG: 16 TABLET, EXTENDED RELEASE ORAL at 09:00

## 2024-04-23 RX ADMIN — GABAPENTIN 300 MG: 300 CAPSULE ORAL at 15:38

## 2024-04-23 ASSESSMENT — PAIN SCALES - GENERAL
PAINLEVEL_OUTOF10: 0 - NO PAIN
PAINLEVEL_OUTOF10: 3
PAINLEVEL_OUTOF10: 7
PAINLEVEL_OUTOF10: 2
PAINLEVEL_OUTOF10: 7
PAINLEVEL_OUTOF10: 3
PAINLEVEL_OUTOF10: 9

## 2024-04-23 ASSESSMENT — PAIN - FUNCTIONAL ASSESSMENT
PAIN_FUNCTIONAL_ASSESSMENT: 0-10

## 2024-04-23 ASSESSMENT — PAIN DESCRIPTION - ORIENTATION: ORIENTATION: LEFT;RIGHT

## 2024-04-23 ASSESSMENT — PAIN DESCRIPTION - LOCATION
LOCATION: BREAST
LOCATION: RIB CAGE
LOCATION: RIB CAGE

## 2024-04-23 NOTE — PROGRESS NOTES
"Nutrition Follow Up Note  Nutrition Assessment  - Follow Up    Reason for Assessment  Reason for Assessment: Provider consult order (Follow Up)    Per Progress Notes:  Pt now on Med/Surg floor, s/p 1 unit PRBCs on 4/17.  Hospice consulted.  Current diet order is Regular.  Ensure Plus High Protein TID.    Past Medical History:   Diagnosis Date    Anemia      Cancer (Multi)       small cell lung cancer    COPD (chronic obstructive pulmonary disease) (Multi)      Emphysema of lung (Multi)      Hypertension      Past Surgical History:   Procedure Laterality Date    APPENDECTOMY   01/29/2016     Appendectomy    LUNG LOBECTOMY   01/29/2016     Lung Lobectomy    OTHER SURGICAL HISTORY   11/15/2013     Thoracoscopy (Therapeutic)     Medications and allergies reviewed.    Pertinent Labs:  4/22/24:  H/H 10.2/31.4 (L) <-- 7.3/22.9 (L) 4/17  MCHC (wnl) <--  31.9 (L) 4/17  Glu 64 (L)  Na 135 (L)  K+ 3.1 (L) <-- 2.8 (L) 4/17  Anion gap 9 (L)  Cr 0.42 (L)      Last Recorded Vitals  Blood pressure 171/82, pulse 74, temperature 36.7 °C (98.1 °F), temperature source Temporal, resp. rate 20, height 1.651 m (5' 5\"), weight 61.8 kg (136 lb 3.9 oz), SpO2 95%.      History:  Food and Nutrient History  Energy Intake: Poor < 50 %  Food and Nutrient History: Visited Malathi in room, she is laying in bed, sleeping intermittently.  Noted pt's lunch tray on tray table in room.  RDN asked pt if she would like to eat some lunch.  Pt declined.  RDN offered Ensure; pt declined.  Per anoop, pt states Ensure makes her sick.  Noted 2 unopened cartons of Ensure on tray table.  +BM x 2 loose on 4/22 per nursing flowsheet.         Food and Nutrient Administration History  Additional Food and Nutrient Administration History: PO Intake per flowsheet:  4/13 to 4/16- no PO intake documented; 4/17- 0% B / 25% L / 50% D; 4/18- 25% B / 0% L / bites of dinner; 4/19- no intake of solid food documented; 4/20- 25% B / 25% L / ? D; 4/21- no intake of solid food " "documented; 4/22- 0% x 3 meals, 50% applesauce.                   Anthropometrics:  Height: 165.1 cm (5' 5\")  Weight: 61.8 kg (136 lb 3.9 oz)  BMI (Calculated): 22.67    Weight Change: 0    Weight Change  Weight History / % Weight Change: Weight history per chart:  4/22/24- 61.8 kg (weight gain since admission likely due to fluid shifts); 4/17/24- 57.4 kg.  Weight history per EPIC chart review from OSH:  3/18/24- 59.8 kg (4% loss x 1 month); 1/13/24- 63 kg (8.9% loss x 3 months); 9/28/23- 68.9 kg (16.7% loss x 6 months)- significant; 1/5/23- 73.5 kg (21.9% loss x 15 months)- significant  Significant Weight Loss: Yes  Interpretation of Weight Loss: 10% in 6 months  Significant Weight Gain: Non-fluid related         IBW/kg (Dietitian Calculated): 63.2 kg  Percent of IBW: 97.8 %     Wt Readings from Last 10 Encounters:   04/23/24 61.8 kg (136 lb 3.9 oz)                               Energy Needs:  Calculated Energy Needs Using Equations  Height: 165.1 cm (5' 5\")  Weight Used for Equation Calculations: 57.4 kg (126 lb 8.7 oz)  [place]- [place] Equation (Overweight or Obese Patients): 1110  Temp: 36.4 °C (97.5 °F)    Estimated Energy Needs  Total Energy Estimated Needs (kCal): 2010 kCal  Total Estimated Energy Need per Day (kCal/kg): 35 kCal/kg  Method for Estimating Needs: 1725 to 2010 kcal/day (30 to 35 kcal/kg actual weight 4/17)    Estimated Protein Needs  Total Protein Estimated Needs (g): 86 g  Total Protein Estimated Needs (g/kg): 1.5 g/kg  Method for Estimating Needs: 1.5 g/kg actual weight 4/17    Estimated Fluid Needs  Total Fluid Estimated Needs (mL): 2010 mL  Method for Estimating Needs: 1 mL/kcal or fluid per medical team.         Nutrition Focused Physical Findings:  Subcutaneous Fat Loss  Orbital Fat Pads: Defer (NFPE deferred; pt lethargic at time of visit.)    Muscle Wasting  Temporalis: Defer (NFPE deferred; pt lethargic at time of visit.)    Edema  Edema: +3 moderate  Edema Location: LUE per nursing " flowsheet.         Physical Findings (Nutrition Deficiency/Toxicity)  Skin: Positive (buttocks wound; blanchable, erythema per nursing flowsheet.)       Nutrition Diagnosis   Malnutrition Diagnosis  Patient has Malnutrition Diagnosis: Yes  Diagnosis Status: Ongoing  Malnutrition Diagnosis: Severe malnutrition related to chronic disease or condition  As Evidenced by: significant unintentional weight loss of 8.9% x 3 months and 16.7% x 6 months; poor nutritional intake meeting less than 75% of estimated needs for greater than or equal to one month.                                                             Nutrition Interventions/Recommendations   Nutrition Prescription  Individualized Nutrition Prescription Provided for : diet, fluids, oral supplements    Food and/or Nutrient Delivery Interventions  Meals and Snacks: Energy-modified diet, Protein-modified diet  Goal: Continue Regular liberalized diet; Encourage intake of protein foods at each meal.                                    Medical Food Supplement: Commercial beverage  Goal: Continue Ensure Plus High Protein TID (1050 kcal/60 g protein)                             Additional Interventions: 1.  Monitor lytes and replace as needed;  2.  Check weight 2 to 3 times per week.         Coordination of Nutrition Care by a Nutrition Professional  Collaboration and Referral of Nutrition Care: Collaboration by nutrition professional with other providers  Goal: Pt reviewed at IDT Rounds; LUX [place]    Education Documentation  No documentation found.    -not indicated.       Nutrition Monitoring and Evaluation   Food and Nutrient Related History  Energy Intake: Estimated energy intake  Criteria: Nutritional intake meeting > 75% of estimated needs- intake declining    Fluid Intake: Estimated fluid intake  Criteria: Fluid intake meeting estimated needs.    Amount of Food: Medical food intake  Criteria: Pt will consume Ensure Plus High Protein TID with good tolerance- pt  declining Ensure                             Anthropometrics: Body Composition/Growth/Weight History  Weight: Measured weight  Criteria: Reduce weight from edema / fluid.                   Biochemical Data, Medical Tests and Procedures                 Nutritional Anemia Profile: Hematocrit, Hemoglobin  Criteria: H/H wnl- improved         Nutrition Focused Physical Findings                      Skin: Impaired wound healing  Criteria: wound healing / skin wnl              Follow Up  Time Spent (min): 50 minutes  Last Date of Nutrition Visit: 04/22/24  Nutrition Follow-Up Needed?: 3-5 days, Dietitian to reassess per policy  Follow up Comment: % meals; ONS

## 2024-04-23 NOTE — PROGRESS NOTES
"Malathi Rubio is a 68 y.o. female on day 7 of admission presenting with Syncope.    Subjective     No overnight events reported.     Patient resting comfortably on room air this morning. She reports that her pain is currently controlled. Hospice meeting has been moved to tomorrow so that patient's spouse can attend.        Objective     Physical Exam  Constitutional:       General: She is not in acute distress.     Appearance: She is ill-appearing. She is not toxic-appearing.   HENT:      Head: Normocephalic and atraumatic.      Mouth/Throat:      Mouth: Mucous membranes are moist.   Eyes:      Conjunctiva/sclera: Conjunctivae normal.   Cardiovascular:      Rate and Rhythm: Normal rate. Rhythm irregular.   Pulmonary:      Effort: No respiratory distress.      Comments: Diminished breath sounds  Abdominal:      General: There is no distension.      Palpations: Abdomen is soft. There is no mass.      Tenderness: There is no abdominal tenderness. There is no guarding.   Musculoskeletal:         General: No swelling.   Skin:     General: Skin is warm and dry.      Coloration: Skin is pale.   Neurological:      Mental Status: She is alert and oriented to person, place, and time. Mental status is at baseline.   Psychiatric:         Mood and Affect: Mood normal.         Behavior: Behavior normal.         Last Recorded Vitals  Blood pressure 170/87, pulse 80, temperature 36.5 °C (97.7 °F), temperature source Temporal, resp. rate 17, height 1.651 m (5' 5\"), weight 61.8 kg (136 lb 3.9 oz), SpO2 95%.  Intake/Output last 3 Shifts:  I/O last 3 completed shifts:  In: 340 (5.5 mL/kg) [P.O.:340]  Out: - (0 mL/kg)   Weight: 61.8 kg     Relevant Results        Scheduled medications  amiodarone, 400 mg, oral, Daily   Followed by  [START ON 5/1/2024] amiodarone, 200 mg, oral, Daily  gabapentin, 300 mg, oral, TID  HYDROmorphone, 16 mg, oral, q24h CHEIKH  lidocaine, 2 patch, transdermal, Daily  magnesium sulfate, 2 g, intravenous, " Daily  oxygen, , inhalation, Continuous - Inhalation  pantoprazole, 40 mg, oral, Daily before breakfast  polyethylene glycol, 17 g, oral, Daily  predniSONE, 40 mg, oral, Daily  sennosides-docusate sodium, 2 tablet, oral, BID      Continuous medications  sodium chloride 0.9%, 10 mL/hr      PRN medications  PRN medications: acetaminophen, acetaminophen, alum-mag hydroxide-simeth, benzocaine-menthol, dextromethorphan-guaifenesin, guaiFENesin, HYDROmorphone, ipratropium-albuteroL, ketorolac, magnesium oxide, methocarbamol, metoprolol, ondansetron, ondansetron **OR** [DISCONTINUED] ondansetron, sodium chloride 0.9%    Results for orders placed or performed during the hospital encounter of 04/13/24 (from the past 24 hour(s))   Basic Metabolic Panel   Result Value Ref Range    Glucose 92 74 - 99 mg/dL    Sodium 135 (L) 136 - 145 mmol/L    Potassium 3.3 (L) 3.5 - 5.3 mmol/L    Chloride 103 98 - 107 mmol/L    Bicarbonate 24 21 - 32 mmol/L    Anion Gap 11 10 - 20 mmol/L    Urea Nitrogen 13 6 - 23 mg/dL    Creatinine 0.44 (L) 0.50 - 1.05 mg/dL    eGFR >90 >60 mL/min/1.73m*2    Calcium 8.3 (L) 8.6 - 10.3 mg/dL   CBC and Auto Differential   Result Value Ref Range    WBC 9.4 4.4 - 11.3 x10*3/uL    nRBC 0.0 0.0 - 0.0 /100 WBCs    RBC 3.26 (L) 4.00 - 5.20 x10*6/uL    Hemoglobin 10.4 (L) 12.0 - 16.0 g/dL    Hematocrit 30.4 (L) 36.0 - 46.0 %    MCV 93 80 - 100 fL    MCH 31.9 26.0 - 34.0 pg    MCHC 34.2 32.0 - 36.0 g/dL    RDW 19.0 (H) 11.5 - 14.5 %    Platelets 37 (LL) 150 - 450 x10*3/uL    Neutrophils % 88.1 40.0 - 80.0 %    Immature Granulocytes %, Automated 4.9 (H) 0.0 - 0.9 %    Lymphocytes % 4.8 13.0 - 44.0 %    Monocytes % 1.9 2.0 - 10.0 %    Eosinophils % 0.0 0.0 - 6.0 %    Basophils % 0.3 0.0 - 2.0 %    Neutrophils Absolute 8.27 (H) 1.20 - 7.70 x10*3/uL    Immature Granulocytes Absolute, Automated 0.46 0.00 - 0.70 x10*3/uL    Lymphocytes Absolute 0.45 (L) 1.20 - 4.80 x10*3/uL    Monocytes Absolute 0.18 0.10 - 1.00 x10*3/uL     Eosinophils Absolute 0.00 0.00 - 0.70 x10*3/uL    Basophils Absolute 0.03 0.00 - 0.10 x10*3/uL   Magnesium   Result Value Ref Range    Magnesium 2.05 1.60 - 2.40 mg/dL       Electrocardiogram, 12-lead PRN ACS symptoms    Result Date: 4/22/2024  Normal sinus rhythm Nonspecific ST and T wave abnormality Abnormal ECG When compared with ECG of 18-APR-2024 00:47, (unconfirmed) Questionable change in QRS axis T wave inversion now evident in Inferior leads Nonspecific T wave abnormality now evident in Lateral leads    MR brain w and wo IV contrast    Result Date: 4/19/2024  Interpreted By:  Opal Seo, STUDY: MR BRAIN W AND WO IV CONTRAST;  4/19/2024 1:25 pm   INDICATION: Signs/Symptoms:history of brain cancer with mets.   COMPARISON: Head CT April 13, 2024.   ACCESSION NUMBER(S): [id number]   ORDERING CLINICIAN: DEE LEE   TECHNIQUE: Axial T2, FLAIR, DWI, gradient echo T2 and  T1 weighted images of brain were acquired. Post contrast T1 weighted images were acquired after administration of 12 mL Dotarem based intravenous contrast.   FINDINGS: The examination is somewhat degraded by patient motion.   CSF Spaces: There is prominence of ventricles and sulci compatible with diffuse parenchymal volume loss. There is a nodular, dural-based, enhancing focus overlying the left frontal lobe measuring approximately 5 mm. Additionally, the motion degraded FLAIR images demonstrate curvilinear hyperintensity within right frontal sulci on image 6 of 28. There is no corresponding susceptibility artifact on gradient echo T2 weighted imaging or bright signal on T1 weighted imaging. Additionally, there is no hyperdensity in this region on prior head CT from April 13, 2024.   Parenchyma: There are scattered, small foci of increased signal on diffusion-weighted imaging primarily in the right frontal lobe. Punctate foci are also noted within bilateral parietal lobes. These are difficult to characterize due to small size. There is  no obvious enhancement following contrast administration to suggest they represent metastasis. There are nonspecific hyperintensities on FLAIR and T2 weighted imaging in the subcortical and periventricular white matter with patchy involvement of the vicki. Prominent perivascular spaces and/or remote lacunar infarcts are demonstrated in the basal ganglia and thalami. There are small areas of encephalomalacia in the cerebellum. There is no mass effect or midline shift.   Paranasal Sinuses and Mastoids: Retention cysts or polyps are noted in the maxillary sinuses. There is opacification of several mastoid air cells, right greater than left.       1. Limited, motion degraded examination. There are several foci of abnormal signal on diffusion-weighted imaging within the right frontal lobe into a lesser extent the bilateral parietal lobes suspicious for areas of acute to subacute ischemic injury. The distribution raises the possibility of an embolic process. 2. There is curvilinear signal abnormality on a single FLAIR image within right frontal sulci which could be due to artifact. However, subarachnoid hemorrhage or proteinaceous material could also give this appearance and follow-up head CT is recommended. 3. Dural-based enhancing mass overlying the left frontal lobe is nonspecific but could represent a meningioma although given the patient's history dural metastasis is difficult to entirely exclude. 4. Nonspecific white matter changes most likely reflect small-vessel ischemic disease in a patient of this age and also involve the vicki. Small areas of remote ischemic injury are also suspected in the cerebellum.       MACRO: Critical Finding:  See findings. Notification was initiated on 4/19/2024 at 1:51 pm by  Opal Seo.  (**-OCF-**)   Signed by: Opal Seo 4/19/2024 1:53 PM Dictation workstation:   [id number]    Electrocardiogram, 12-lead PRN ACS symptoms    Result Date: 4/18/2024  Normal sinus rhythm Normal  ECG When compared with ECG of 16-APR-2024 13:31, (unconfirmed) Sinus rhythm has replaced Atrial fibrillation Vent. rate has decreased BY  65 BPM Non-specific change in ST segment in Lateral leads    Electrocardiogram, 12-lead PRN ACS symptoms    Result Date: 4/17/2024  Atrial fibrillation with rapid ventricular response Nonspecific ST abnormality Abnormal ECG When compared with ECG of 13-APR-2024 21:00, Atrial fibrillation has replaced Sinus rhythm Nonspecific T wave abnormality no longer evident in Inferior leads    ECG 12 lead    Result Date: 4/16/2024  Sinus tachycardia with occasional Premature ventricular complexes Nonspecific ST and T wave abnormality Abnormal ECG When compared with ECG of 10-AUG-2015 23:27, Premature ventricular complexes are now Present Vent. rate has increased BY  65 BPM ST now depressed in Anterior leads Nonspecific T wave abnormality now evident in Anterolateral leads See ED provider note for full interpretation and clinical correlation Confirmed by Denisa Looney ([id number]) on 4/16/2024 2:34:25 PM    ECG 12 lead    Result Date: 4/16/2024  Sinus tachycardia Otherwise normal ECG When compared with ECG of 13-APR-2024 19:59, (unconfirmed) Premature ventricular complexes are no longer Present Nonspecific T wave abnormality now evident in Inferior leads Nonspecific T wave abnormality no longer evident in Lateral leads See ED provider note for full interpretation and clinical correlation Confirmed by Denisa Looney ([id number]) on 4/16/2024 2:26:29 PM    CT chest abdomen pelvis w IV contrast    Result Date: 4/13/2024  Interpreted By:  Clua Mcdonald, STUDY: CT CHEST ABDOMEN PELVIS W IV CONTRAST;  4/13/2024 10:05 pm   INDICATION: Signs/Symptoms:syncope with known small cell.   COMPARISON: CT scan of the abdomen and pelvis 08/11/2015.   ACCESSION NUMBER(S): [id number]   ORDERING CLINICIAN: YAIR FREEMAN   TECHNIQUE: Axial CT images of the chest, abdomen and pelvis with coronal and sagittal  reconstructed images obtained after intravenous administration of 75 mL of Omnipaque 350.   FINDINGS: CHEST:   VESSELS: Aorta and pulmonary artery are normal caliber. Atherosclerotic changes in the aorta and arch vessels. HEART: Normal size. No pericardial effusion. Aortic root and mild coronary artery calcifications noted. MEDIASTINUM AND DARNELL: There is a large heterogeneous hypoattenuating subcarinal mass measuring 4.3 x 4.2 cm with significant mass-effect on the esophagus with loss of fat planes with the esophagus. This is concerning for necrotic lymph node with local invasion not excluded. No axillary or hilar adenopathy. Calcified mediastinal and right hilar lymph nodes noted. LUNG, PLEURA, LARGE AIRWAYS: Moderate right pleural effusion. Advanced centrilobular and paraseptal emphysema noted. Biapical pleuroparenchymal scarring. There is a large necrotic cavitary mass in the right mid thorax with osseous destruction of the 5th and 6th ribs with pathologic fracture. This measures approximately 5.1 x 6.6 x 8.3 cm. There is adjacent airspace opacity favored to relate to compressive atelectasis. A 1.8 x 2.7 cm spiculated nodule is noted in the left lung apex. Scattered calcified granulomas noted bilaterally. Several small tracheal diverticulum noted incidentally. CHEST WALL AND LOWER NECK: Cavitary mass in the right mid posterior thorax as described above. BONES: There is a 5.5 x 6.9 x 5.2  cm soft tissue mass in the left scapula with osseous destruction of the scapular body, incompletely imaged. There is diffuse scattered sclerotic lesions throughout the axial and appendicular skeletal system consistent with metastases. Multilevel degenerative changes of the spine.   ABDOMEN:   LIVER: Within normal limits. BILE DUCTS: Normal caliber. GALLBLADDER: No calcified gallstones. No wall thickening. PANCREAS: Within normal limits. SPLEEN: Within normal limits. ADRENALS: There is a 2 cm heterogeneous hypodense nodule in the  left adrenal gland concerning for metastases. Right adrenal glands within normal limits. KIDNEYS: Symmetric renal enhancement. No hydronephrosis or perinephric fluid collection. URETERS: No hydroureter.   VESSELS: Calcified and noncalcified atheromatous plaque is noted in the aortoiliac and mesenteric vessels. There is ectasia of the infrarenal aorta measuring 2.9 x 2.9 cm. RETROPERITONEUM: Within normal limits.   PELVIS:   REPRODUCTIVE ORGANS: Uterus is present. No adnexal mass. BLADDER: Under distended with apparent wall thickening.   BOWEL: Normal caliber. Appendix is not identified with certainty. No pericecal inflammatory changes seen. Moderate stool burden. No pneumatosis or portal venous gas. PERITONEUM: No ascites or free air, no fluid collection. There is mild presacral edema.   ABDOMINAL WALL: Mild body wall edema. BONES: Multilevel degenerative changes of the spine. Diffuse sclerotic lesions in the axial and appendicular skeletal system consistent with osseous metastases.       There is a large necrotic cavitary mass in the right posterior thorax measuring 5.1 x 6.6 x 8.3 cm. There is osseous destruction and pathologic fracture of the posterior 5th and 6th ribs. A 1.8 x 2.7 cm spiculated nodule is noted in the left lung apex. Findings likely relate to patient's provided history of carcinoma. Correlate with prior workup.   Advanced centrilobular and paraseptal emphysema noted. Moderate right pleural effusion with areas of loculation. Adjacent airspace opacity favored to relate to compressive atelectasis. Superimposed infection not excluded. Correlate clinically.   There is a large heterogeneous hypoattenuating subcarinal mass measuring 4.3 x 4.2 cm with significant mass-effect on the esophagus with loss of fat planes with the esophagus. This is concerning for necrotic lymph node with local invasion not excluded.   There is a 2 cm heterogeneous hypodense nodule in the left adrenal gland concerning for  metastases.   There is a 5.5 x 6.9 x 5.2 cm soft tissue mass in the left scapula with osseous destruction of the scapular body, incompletely imaged. There is diffuse scattered sclerotic lesions throughout the axial and appendicular skeletal system consistent with osseous metastases.   Additional findings as described above.     MACRO: None   Signed by: Clau Mcdonald 4/13/2024 11:10 PM Dictation workstation:   [id number]    CT head wo IV contrast    Result Date: 4/13/2024  Interpreted By:  Clau Mcdonald, STUDY: CT HEAD WO IV CONTRAST;  4/13/2024 9:55 pm   INDICATION: Signs/Symptoms:syncope.   COMPARISON: None.   ACCESSION NUMBER(S): [id number]   ORDERING CLINICIAN: YAIR FREEMAN   TECHNIQUE: Axial noncontrast CT images of the head.   FINDINGS: BRAIN PARENCHYMA: Gray-white matter interfaces are preserved. No mass, mass effect or midline shift. Moderate deep and periventricular white matter hypodensities are nonspecific, but favored to represent chronic small vessel ischemic changes.   HEMORRHAGE: No acute intracranial hemorrhage. VENTRICLES and EXTRA-AXIAL SPACES: Moderate volume loss with prominence of the ventricles and sulci. EXTRACRANIAL SOFT TISSUES: Within normal limits. PARANASAL SINUSES/MASTOIDS: Partial opacification of the maxillary sinuses. Remainder of the visualized paranasal sinuses and mastoid air cells are aerated. CALVARIUM: No depressed skull fracture. No destructive osseous lesion.   OTHER FINDINGS: None.       No acute intracranial abnormality.   Chronic changes as noted above.   MACRO: None   Signed by: Clau Mcdonald 4/13/2024 10:46 PM Dictation workstation:   [id number]    XR chest 1 view    Result Date: 3/24/2024  * * *Final Report* * * DATE OF EXAM: Mar 24 2024  1:08PM   CLAUDE   [id number]  -  XR CHEST 1V FRONTAL PORT  / ACCESSION #  [id number] PROCEDURE REASON: Fever      * * * * Physician Interpretation * * * *  EXAMINATION:  CHEST RADIOGRAPH (PORTABLE SINGLE VIEW AP) Exam Date/Time:   3/24/2024 1:08 PM Clinical History: Fever MQ:  [id number] Comparison:  Portable AP CXR 02/17/2024 RESULT: Lines, tubes, and devices:  None. Lungs and pleura:  An irregularly marginated nodule suspicious for neoplasm persists in the medial aspect of the left upper lobe. A more masslike opacity, also suspicious for neoplasm, overlies the right mid to upper lung. I suspect upper lobe predominant pulmonary emphysematous changes. Multiple calcified granulomata are scattered in both lungs. There has been development of a right basilar airspace opacity most commonly secondary to atelectasis or pneumonia/aspiration. There is stable blunting of the right costophrenic angle secondary to pleural thickening or small effusion. No pneumothorax is identified. Cardiomediastinal silhouette:  Stable cardiomediastinal silhouette. The heart size is within normal limits. There are atherosclerotic calcifications in the aortic arch. The known subcarinal lymph node mass is not well visualized radiographically. Other:  There has been lytic osseous resolution of the posterior aspects of the right fifth and sixth ribs, suspicious for neoplastic involvement.    IMPRESSION: See result. : COLTON   Transcribe Date/Time: Mar 24 2024  1:54P Dictated by : NIYAH ENRIQUEZ MD This examination was interpreted and the report reviewed and electronically signed by: NIYAH ENRIQUEZ MD on Mar 24 2024  1:57PM  EST               Malnutrition Diagnosis Status: New  Malnutrition Diagnosis: Severe malnutrition related to chronic disease or condition  As Evidenced by: significant unintentional weight loss of 8.9% x 3 months and 16.7% x 6 months; poor nutritional intake meeting less than 75% of estimated needs for greater than or equal to one month.  I agree with the dietitian's malnutrition diagnosis.      Assessment/Plan   Principal Problem:    Syncope  Active Problems:    Acute pain    Small cell lung cancer (Multi)    Malignant neoplasm metastatic to  bone (Multi)    COPD (chronic obstructive pulmonary disease) (Multi)    Acute on chronic anemia    Community acquired bacterial pneumonia    Malnutrition (Multi)    PUD (peptic ulcer disease)    Thrombocytopenia (CMS-HCC)    Mixed hyperlipidemia    #Lung cancer with metastases to bone, brain, liver  #COPD, in acute exacerbation 2/2 pneumonia, bacterial (improving)   #Acute hypoxic respiratory failure (resolved)   - Patient follows with hem/onc at [place]  - WBC up to 11.6 (on steroids)   - COVID, flu, RSV negative    - Procal 0.43   - BCx negative- final report   - CT chest with large necrotic cavitary mass in the right posterior thorax with osseous destruction; advanced emphysema, airspace opacity favored to represent compressive atelectasis    - Completed 6 days of Zosyn, changed to Augmentin x 2 days   - Solumedrol 40 mg IVP transitioned to prednisone PO   - Home hydromorphone brought in for pain 2/2 metastases; continue hydromorphone scheduled and PRN   - Continue gabapentin TID, lidocaine patches   - DuoNebs q4h PRN   - Mucinex BID PRN   - Tylenol PRN for fever   - RT eval and treat protocol, Bronchial hygiene  - Monitor SpO2 continuously, baseline O2 None   - Wean O2 as tolerated  - Pulmonology and hematology/oncology consulted, appreciate recs   - Code status discussion completed with patient and spouse 4/17; now DNRCCA/DNI   - MRI Brain 4/19: There are several foci of abnormal signal on diffusion-weighted imaging within the right frontal lobe into a lesser extent the bilateral parietal lobes suspicious for areas of acute to subacute ischemic injury. The distribution raises the possibility of an embolic process. There is curvilinear signal abnormality on a single FLAIR image within right frontal sulci which could be due to artifact. However, subarachnoid hemorrhage or proteinaceous material could also give this appearance and follow-up head CT is recommended. Dural-based enhancing mass overlying the left  frontal lobe is nonspecific but could represent a meningioma although given the patient's history dural metastasis is difficult to entirely exclude. 4. Nonspecific white matter changes most likely reflect small-vessel ischemic disease in a patient of this age and also involve the vicki. Small areas of remote ischemic injury are also suspected in the cerebellum.    - Neuro on call consulted to review results of MRI brain, no further recommendations at this time given metastatic disease with poor prognosis      #Hypokalemia (improving)   #Hypomagnesemia (resolved)  - K 3.1 > 3.3 today, repleted per protocol   - Mag 2.07 > 2.05 today   - Per oncology, patient should receive magnesium sulfate 2g IV daily regardless of mag level; held today for poor IV access- patient meeting with hospice tomorrow. Will start Mag-Ox 400 mg PO BID while no IV access  - Telemetry monitoring   - Encourage PO intake as tolerated  - Daily BMP and mag level     #AF RVR (improving)   #Non-MI elevated troponins   - Received Lopressor 5 mg IVP x 1 dose with improvement in HR   - Currently AF rate 100s   - Trop  98 > 92 > 80 > 154 > 75 > 68 > 53, patient denies chest pain   - Not on anticoagulation due to anemia  - Cardiology consulted, started amiodarone 400 mg PO every day x 2 weeks then 200 mg PO every day   - PRN metoprolol as needed for HR control  - Telemetry monitoring    #Syncope (resolved)  #Acute on chronic anemia (improving)   #Thrombocytopenia (improving)   - Patient had syncopal episode at home while on the toilet; has had poor oral intake at home   - CT head with no acute intracranial abnormality  - H/H stable, 10.2/31.4 > 10.4/30.4 today   - plts 82 > 80 > 49 > 59 > 35 > 37   - Anticoagulation/DVT ppx held  - Patient received 1 unit pRBCs in the ED and again 4/17   - B12 1232, folate 12.9, iron 22, ferritin 2540, % saturation not calculated  - Encourage PO intake as tolerated  - Telemetry monitoring  - Daily CBC      #Malnutrition,  severe 2/2 active malignancy  - Albumin 2.4   - Regular diet as tolerated with supplements per nutrition   - Hypoglycemia protocol ordered  - Nutrition consulted, appreciate recs     DVT PPx: SCDs   GI PPx: Protonix  Diet: Regular  Code Status: DNRCCA/DNI      Disposition: Patient requires inpatient management at this time.  Possible discharge to hospice house 4/24 after bedside meeting with [facility].     Total accumulated time spent face to face and not face to face preparing to see the patient, obtaining and reviewing separately obtained history; performing a medically appropriate examination and/or evaluation; counseling and educating the patient, family; ordering medications, tests, or procedures; referring and communicating with other health care professionals; documenting clinical information in the patient's medical record; independently interpreting results and communicating the results to the patient, family; and care coordination was 35 minutes.           Virgen Burr PA-C

## 2024-04-23 NOTE — PROGRESS NOTES
Physical Therapy                 Therapy Communication Note    Patient Name: Malathi Rubio  MRN: [id number]  Today's Date: 4/23/2024     Discipline: Physical Therapy    Missed Visit Reason: Missed Visit Reason: Patient refused (Attempted PT, patient declined. Provided encouragement for participation, however, patient continued to decline stating that she is not up to it today. Will inform nurse and supervising therapist.)    Missed Time: Attempt 0829

## 2024-04-23 NOTE — CARE PLAN
The patient's goals for the shift include pain relief    The clinical goals for the shift include report pain managed with interventions    Over the shift, the patient did make progress toward the following goals. Pt reported PRN dilaudid and toradol effective for abd/under breast pain. Slept throughout shift, alert x2 and 3 at times. Forgetful and reported it was during the day at night. Able to redirect. Voided on bed pan x3. Spouse reported he will be back in AM before his stress test, he plans on meeting with care cordination and wants [facility] to [facility] and then goal is to go home. Call light with in reach.

## 2024-04-23 NOTE — PROGRESS NOTES
Occupational Therapy   Therapy Communication Note    Patient Name: Malathi Rubio  MRN: [id number]  Today's Date: 4/23/2024     Discipline: Occupational Therapy    Missed Visit Reason: Missed Visit Reason: Patient refused (Pt. and spouse declined therapy as pt. is in too much pain to tolerate. Discussed plan for future and spouse requested pt. be taken off therapy at this time. Notified GURDEEP)    Missed Time: Attempt 0920

## 2024-04-23 NOTE — PROGRESS NOTES
8:35 am  Spoke with  Andrew and patient at bedside.  They both agree to sign on with [facility].  Sent message thru [facility]port to [facility] to set up appt. So patient can sign on with them.  TCC following.      11:25 am  [facility] will be at bedside  between 1-1:30 pm to speak with patient and spouse.  Updated team.      11:35 am   Update from R -the spouse's appt in [place] will take hours now. He has asked for an appt for tomorrow. I will update you once we confirm the new appt.  Medical team updated.     2:15 pm  Appointment scheduled for Wednesday at noon at patient's bedside.  Medical team aware.  TCC following.

## 2024-04-23 NOTE — CARE PLAN
Problem: Pain  Goal: My pain/discomfort is manageable  Outcome: Met   The patient's goals for the shift include pain relief    The clinical goals for the shift include pain will be managed with pain medication    Goal met. Vss. Patient has ongoing pain. Pain medications given per order. Patient gets some relief from pain medications. Patient resting in bed at this time with call light in reach and spouse at bedside.

## 2024-04-24 ENCOUNTER — APPOINTMENT (OUTPATIENT)
Dept: HEMATOLOGY/ONCOLOGY | Facility: HOSPITAL | Age: 69
End: 2024-04-24
Payer: MEDICARE

## 2024-04-24 LAB
ANION GAP SERPL CALC-SCNC: 10 MMOL/L (ref 10–20)
BASOPHILS # BLD AUTO: 0.04 X10*3/UL (ref 0–0.1)
BASOPHILS NFR BLD AUTO: 0.3 %
BUN SERPL-MCNC: 19 MG/DL (ref 6–23)
CALCIUM SERPL-MCNC: 8.8 MG/DL (ref 8.6–10.3)
CHLORIDE SERPL-SCNC: 104 MMOL/L (ref 98–107)
CO2 SERPL-SCNC: 26 MMOL/L (ref 21–32)
CREAT SERPL-MCNC: 0.51 MG/DL (ref 0.5–1.05)
EGFRCR SERPLBLD CKD-EPI 2021: >90 ML/MIN/1.73M*2
EOSINOPHIL # BLD AUTO: 0.12 X10*3/UL (ref 0–0.7)
EOSINOPHIL NFR BLD AUTO: 1 %
ERYTHROCYTE [DISTWIDTH] IN BLOOD BY AUTOMATED COUNT: 19.9 % (ref 11.5–14.5)
GLUCOSE SERPL-MCNC: 82 MG/DL (ref 74–99)
HCT VFR BLD AUTO: 33.1 % (ref 36–46)
HGB BLD-MCNC: 10.8 G/DL (ref 12–16)
IMM GRANULOCYTES # BLD AUTO: 0.54 X10*3/UL (ref 0–0.7)
IMM GRANULOCYTES NFR BLD AUTO: 4.4 % (ref 0–0.9)
LYMPHOCYTES # BLD AUTO: 0.9 X10*3/UL (ref 1.2–4.8)
LYMPHOCYTES NFR BLD AUTO: 7.3 %
MAGNESIUM SERPL-MCNC: 2.07 MG/DL (ref 1.6–2.4)
MCH RBC QN AUTO: 31.2 PG (ref 26–34)
MCHC RBC AUTO-ENTMCNC: 32.6 G/DL (ref 32–36)
MCV RBC AUTO: 96 FL (ref 80–100)
MONOCYTES # BLD AUTO: 0.68 X10*3/UL (ref 0.1–1)
MONOCYTES NFR BLD AUTO: 5.5 %
NEUTROPHILS # BLD AUTO: 10 X10*3/UL (ref 1.2–7.7)
NEUTROPHILS NFR BLD AUTO: 81.5 %
NRBC BLD-RTO: 0 /100 WBCS (ref 0–0)
PLATELET # BLD AUTO: 57 X10*3/UL (ref 150–450)
POTASSIUM SERPL-SCNC: 3.4 MMOL/L (ref 3.5–5.3)
RBC # BLD AUTO: 3.46 X10*6/UL (ref 4–5.2)
SODIUM SERPL-SCNC: 137 MMOL/L (ref 136–145)
WBC # BLD AUTO: 12.3 X10*3/UL (ref 4.4–11.3)

## 2024-04-24 PROCEDURE — 2500000006 HC RX 250 W HCPCS SELF ADMINISTERED DRUGS (ALT 637 FOR ALL PAYERS): Mod: IPSPLIT

## 2024-04-24 PROCEDURE — 2500000004 HC RX 250 GENERAL PHARMACY W/ HCPCS (ALT 636 FOR OP/ED): Mod: IPSPLIT | Performed by: NURSE PRACTITIONER

## 2024-04-24 PROCEDURE — 2500000001 HC RX 250 WO HCPCS SELF ADMINISTERED DRUGS (ALT 637 FOR MEDICARE OP): Mod: IPSPLIT

## 2024-04-24 PROCEDURE — 2500000004 HC RX 250 GENERAL PHARMACY W/ HCPCS (ALT 636 FOR OP/ED): Mod: IPSPLIT

## 2024-04-24 PROCEDURE — 80048 BASIC METABOLIC PNL TOTAL CA: CPT | Mod: IPSPLIT | Performed by: NURSE PRACTITIONER

## 2024-04-24 PROCEDURE — 2500000001 HC RX 250 WO HCPCS SELF ADMINISTERED DRUGS (ALT 637 FOR MEDICARE OP): Mod: IPSPLIT | Performed by: NURSE PRACTITIONER

## 2024-04-24 PROCEDURE — 1100000001 HC PRIVATE ROOM DAILY: Mod: IPSPLIT

## 2024-04-24 PROCEDURE — 36415 COLL VENOUS BLD VENIPUNCTURE: CPT | Mod: IPSPLIT | Performed by: NURSE PRACTITIONER

## 2024-04-24 PROCEDURE — 85025 COMPLETE CBC W/AUTO DIFF WBC: CPT | Mod: IPSPLIT | Performed by: NURSE PRACTITIONER

## 2024-04-24 PROCEDURE — 2500000006 HC RX 250 W HCPCS SELF ADMINISTERED DRUGS (ALT 637 FOR ALL PAYERS): Mod: IPSPLIT | Performed by: NURSE PRACTITIONER

## 2024-04-24 PROCEDURE — 99233 SBSQ HOSP IP/OBS HIGH 50: CPT | Performed by: INTERNAL MEDICINE

## 2024-04-24 PROCEDURE — 99233 SBSQ HOSP IP/OBS HIGH 50: CPT

## 2024-04-24 PROCEDURE — 2500000005 HC RX 250 GENERAL PHARMACY W/O HCPCS: Mod: IPSPLIT | Performed by: NURSE PRACTITIONER

## 2024-04-24 PROCEDURE — 83735 ASSAY OF MAGNESIUM: CPT | Mod: IPSPLIT | Performed by: NURSE PRACTITIONER

## 2024-04-24 RX ORDER — POTASSIUM CHLORIDE 20 MEQ/1
40 TABLET, EXTENDED RELEASE ORAL ONCE
Status: COMPLETED | OUTPATIENT
Start: 2024-04-24 | End: 2024-04-24

## 2024-04-24 RX ORDER — HYDROMORPHONE HYDROCHLORIDE 2 MG/1
2 TABLET ORAL EVERY 2 HOUR PRN
Status: DISCONTINUED | OUTPATIENT
Start: 2024-04-24 | End: 2024-04-25 | Stop reason: HOSPADM

## 2024-04-24 RX ADMIN — HYDROMORPHONE HYDROCHLORIDE 2 MG: 2 TABLET ORAL at 04:25

## 2024-04-24 RX ADMIN — HYDROMORPHONE HYDROCHLORIDE 2 MG: 2 TABLET ORAL at 09:00

## 2024-04-24 RX ADMIN — HYDROMORPHONE HYDROCHLORIDE 2 MG: 2 TABLET ORAL at 15:20

## 2024-04-24 RX ADMIN — HYDROMORPHONE HYDROCHLORIDE 16 MG: 16 TABLET, EXTENDED RELEASE ORAL at 09:00

## 2024-04-24 RX ADMIN — LIDOCAINE 4% 1 PATCH: 40 PATCH TOPICAL at 09:18

## 2024-04-24 RX ADMIN — KETOROLAC TROMETHAMINE 15 MG: 15 INJECTION, SOLUTION INTRAMUSCULAR; INTRAVENOUS at 09:19

## 2024-04-24 RX ADMIN — AMIODARONE HYDROCHLORIDE 400 MG: 200 TABLET ORAL at 09:19

## 2024-04-24 RX ADMIN — POTASSIUM CHLORIDE 40 MEQ: 1500 TABLET, EXTENDED RELEASE ORAL at 09:17

## 2024-04-24 RX ADMIN — HYDROMORPHONE HYDROCHLORIDE 2 MG: 2 TABLET ORAL at 18:25

## 2024-04-24 RX ADMIN — GABAPENTIN 300 MG: 300 CAPSULE ORAL at 20:30

## 2024-04-24 RX ADMIN — SODIUM CHLORIDE 10 ML/HR: 9 INJECTION, SOLUTION INTRAVENOUS at 01:32

## 2024-04-24 RX ADMIN — PANTOPRAZOLE SODIUM 40 MG: 40 TABLET, DELAYED RELEASE ORAL at 06:12

## 2024-04-24 RX ADMIN — PREDNISONE 40 MG: 20 TABLET ORAL at 09:16

## 2024-04-24 RX ADMIN — GABAPENTIN 300 MG: 300 CAPSULE ORAL at 15:20

## 2024-04-24 RX ADMIN — HYDROMORPHONE HYDROCHLORIDE 2 MG: 2 TABLET ORAL at 20:30

## 2024-04-24 RX ADMIN — GABAPENTIN 300 MG: 300 CAPSULE ORAL at 09:16

## 2024-04-24 ASSESSMENT — COGNITIVE AND FUNCTIONAL STATUS - GENERAL
MOBILITY SCORE: 8
DAILY ACTIVITIY SCORE: 13
CLIMB 3 TO 5 STEPS WITH RAILING: TOTAL
PERSONAL GROOMING: A LOT
MOBILITY SCORE: 8
DRESSING REGULAR LOWER BODY CLOTHING: A LOT
MOVING FROM LYING ON BACK TO SITTING ON SIDE OF FLAT BED WITH BEDRAILS: A LOT
STANDING UP FROM CHAIR USING ARMS: TOTAL
TOILETING: A LOT
WALKING IN HOSPITAL ROOM: TOTAL
DRESSING REGULAR UPPER BODY CLOTHING: A LOT
HELP NEEDED FOR BATHING: A LOT
EATING MEALS: A LITTLE
TOILETING: A LOT
HELP NEEDED FOR BATHING: A LOT
TURNING FROM BACK TO SIDE WHILE IN FLAT BAD: A LOT
PERSONAL GROOMING: A LOT
DRESSING REGULAR LOWER BODY CLOTHING: A LOT
TURNING FROM BACK TO SIDE WHILE IN FLAT BAD: A LOT
MOVING FROM LYING ON BACK TO SITTING ON SIDE OF FLAT BED WITH BEDRAILS: A LOT
DRESSING REGULAR UPPER BODY CLOTHING: A LOT
EATING MEALS: A LITTLE
MOVING TO AND FROM BED TO CHAIR: TOTAL
MOVING TO AND FROM BED TO CHAIR: TOTAL
DAILY ACTIVITIY SCORE: 13
WALKING IN HOSPITAL ROOM: TOTAL
STANDING UP FROM CHAIR USING ARMS: TOTAL
CLIMB 3 TO 5 STEPS WITH RAILING: TOTAL

## 2024-04-24 ASSESSMENT — PAIN DESCRIPTION - LOCATION: LOCATION: BACK

## 2024-04-24 ASSESSMENT — PAIN DESCRIPTION - ORIENTATION: ORIENTATION: LEFT;RIGHT

## 2024-04-24 ASSESSMENT — PAIN SCALES - GENERAL
PAINLEVEL_OUTOF10: 7
PAINLEVEL_OUTOF10: 7
PAINLEVEL_OUTOF10: 5 - MODERATE PAIN
PAINLEVEL_OUTOF10: 7
PAINLEVEL_OUTOF10: 2
PAINLEVEL_OUTOF10: 6

## 2024-04-24 ASSESSMENT — PAIN - FUNCTIONAL ASSESSMENT: PAIN_FUNCTIONAL_ASSESSMENT: 0-10

## 2024-04-24 NOTE — PROGRESS NOTES
Nutrition Note:    Pt declining Ensure Plus High Protein.  Pt amenable to trial of Ensure Clear.  Order changed in EMR and diet office notified.  RDN brought samples of Ensure Clear apple and mixed berry to patient with lunch tray.    Time Spent:  15 minutes

## 2024-04-24 NOTE — NURSING NOTE
RN Hospice Note    Malathi Rubio is a Hospice Patient.   Hospice terminal diagnosis: lung cancerwith metsto bone, brain and liver   Physician: Dr Hilario del castillo  Visit type: discharge planning     Comments/recommendations: met with pt and spouse. Both requesting and agreeing to go to [facility] for pain control however we currently do not have a bed. Pt is on bed list. Chart faxed, report called.  Pt on WILL CALL with community care.  [facility] will transfer to hospice house when bed available tonight vs tomorrow.   Ambulance forms at the desk.  [facility]rec to increase prn dilaudidto 2mgpo/[id number] .       Discharge Planning:  Patient to be discharged to  hospice house     The following is to be completed:  Discharge order: done  State DNR signed by MD: done  Nursing facility referral/transfer form: na  Medication reconciliation: done  PAS/RR or convalescent stay form: na  Prescriptions for al narcotics/new medications: none needed  Transportation: pt on will call with community care  Other: hospice will call when bed available. Thank you for consult , hospital RN does not have to call report    Plan of care reviewed with patient/family members tate rubio    Plan of care reviewed with hospital staff members: maria luisa henry cm     Please notify [facility] of any changes in condition. Thank you.  Office: [phone] (8 am-6:30 pm M-F and 8 am-4:30 pm weekends and holidays)   [phone] (6:30 pm-8 am M-F and 4:30 pm-8 am weekends and holidays)    Anne Tidwell RN

## 2024-04-24 NOTE — CARE PLAN
The patient's goals for the shift include pain relief    The clinical goals for the shift include Maintain tolerable pain level.     Over the shift, the patient did not make progress toward the following goals. Barriers to progression include cancer. Recommendations to address these barriers include offer pain medication when due.    Problem: Pain  Goal: My pain/discomfort is manageable  Outcome: Not Progressing     Problem: Safety  Goal: Patient will be injury free during hospitalization  Outcome: Not Progressing  Goal: I will remain free of falls  Outcome: Not Progressing     Problem: Daily Care  Goal: Daily care needs are met  Outcome: Not Progressing     Problem: Psychosocial Needs  Goal: Demonstrates ability to cope with hospitalization/illness  Outcome: Not Progressing  Goal: Collaborate with me, my family, and caregiver to identify my specific goals  Outcome: Not Progressing     Problem: Discharge Barriers  Goal: My discharge needs are met  Outcome: Not Progressing

## 2024-04-24 NOTE — CARE PLAN
"The patient's goals for the shift include pain relief    The clinical goals for the shift include Maintain pain at tolerable rate.    Over the shift, the patient did make progress toward the following goals.     1930 Assumed care of patient. Assessment completed as documented.  at bedside. Patient resting, easily awakens. Currently on room air. States pain is \"good\" rating 1-2/10. Denies the need for pain medication at this time. Call light within reach.     2120 In to give PM meds. Discussed increasing fluid intake. Patient states she hasn't urinated or had a BM in 2 days. Reviewed I&O with patient and , last documented urinated was this am.  states that patient has not been drinking fluids today, will encourage. Offered patient toileting, she denies the need at this time. Will bladder scan patient.     2200 Patient continues to drink some water occasionally. Patient requesting to use bedpan.    2300 Bladder scan 154 ml. Unable to void. Requests chux under her in case of incontinence. Offered patient to get up to C as she previously had, patient declined.     2311 Secure chat sent to SHASHI Hollins CNP regarding low urine output. Patient currently hospice consult. Patient and  would like to continue monitoring and have patient drink fluids, declines beck at this time, CNP agrees with plan. Provider states she will order if patient would like beck placed. Call light within reach.    0100 Patient sleeping, no signs of distress noted.  at bedside. Call light within reach.      "

## 2024-04-24 NOTE — PROGRESS NOTES
Patient and spouse, Andrew meeting with Hospice today at Noon.  TCC following.     1:00 pm  Patient has signed on with [facility]- she will be transferred to the [facility]ospice [facility] when a bed opens up.  Medical team aware.

## 2024-04-24 NOTE — NURSING NOTE
Patient complained of pain in her back . Had not voided but felt she had to go. Put on bedpan and voided in chux so unable to measure. Relieved the back pain when voided. Refusing Berger at present. Given PO analgesia.

## 2024-04-24 NOTE — PROGRESS NOTES
Treatment Pre-Review for Date of Service: 4/24/24    Diagnosis:  No matching staging information was found for the patient.    Regimen: Lurbinectidin every 21 days     Current Cycle/Day: Cycle 1 Day 1    Treatment Date Appropriate: Yes; Last Treatment: NA  Date change required: none    Dose or Regimen Modifications: None noted during pre-review    Starting dose decreased 3.2 -> 2.4 mg/m2    Med Rec/Drug Interactions: None noted during pre-review    Growth Factor: none    Financial Clearance/Authorization: Yes    Echo:  No results found for this or any previous visit from the past 1095 days.    Lifetime Dose Tracking   No doses have been documented on this patient for the following tracked chemicals: doxorubicin, epirubicin, idarubicin, daunorubicin, mitoxantrone, bleomycin, mitomycin, carmustine, doxorubicin HCl pegylated liposomal, daunorubicin citrate liposomal     Comments: none    I Pj Coffman, PharmD hereby acknowledge that the treatment plan indicated above has been verified for correctness to the best of my ability on 4/24/2024 at 8:16 AM.

## 2024-04-24 NOTE — PROGRESS NOTES
Malathi Rubio is a 68 y.o. female on day 8 of admission presenting with Syncope.      Subjective   Feels tired. Pain is under control with opioids. Denies bleeding anywhere.        Objective     Last Recorded Vitals  /63 (BP Location: Right arm, Patient Position: Lying)   Pulse 61   Temp 36.4 °C (97.5 °F) (Temporal)   Resp 22   Wt 61.8 kg (136 lb 3.9 oz)   SpO2 94%   Intake/Output last 3 Shifts:    Intake/Output Summary (Last 24 hours) at 4/24/2024 1424  Last data filed at 4/24/2024 1045  Gross per 24 hour   Intake 120 ml   Output --   Net 120 ml       Admission Weight  Weight: 59.7 kg (131 lb 9.8 oz) (04/13/24 2003)    Daily Weight  04/23/24 : 61.8 kg (136 lb 3.9 oz)    Image Results  Electrocardiogram, 12-lead PRN ACS symptoms  Normal sinus rhythm  Nonspecific ST and T wave abnormality  Abnormal ECG  When compared with ECG of 18-APR-2024 00:47, (unconfirmed)  Questionable change in QRS axis  T wave inversion now evident in Inferior leads  Nonspecific T wave abnormality now evident in Lateral leads      Physical Exam  Constitutional:       Appearance: She is ill-appearing.   HENT:      Head: Normocephalic.   Cardiovascular:      Rate and Rhythm: Regular rhythm. Tachycardia present.   Pulmonary:      Effort: Pulmonary effort is normal. No respiratory distress.   Abdominal:      General: Abdomen is flat.         Relevant Results               Assessment/Plan      Her Plt count is lower than before. Her performance status is 3-4. I was hoping she would improve in the last week regarding her counts and performance but no improvement at all. Except that she did not require PRBC transfusions.   She wants to go for hospice care. I agree with her decision based on her poor performance status + incurable cancer + chemo-resistant cancer. I answered all her questions. She is at ease with her decision.        Principal Problem:    Syncope  Active Problems:    Acute pain    Small cell lung cancer (Multi)     Malignant neoplasm metastatic to bone (Multi)    COPD (chronic obstructive pulmonary disease) (Multi)    Acute on chronic anemia    Community acquired bacterial pneumonia    Malnutrition (Multi)    PUD (peptic ulcer disease)    Thrombocytopenia (CMS-HCC)    Mixed hyperlipidemia                Chelsea Walls MD

## 2024-04-24 NOTE — CARE PLAN
Problem: Pain  Goal: My pain/discomfort is manageable  Outcome: Progressing     Problem: Safety  Goal: Patient will be injury free during hospitalization  Outcome: Progressing  Goal: I will remain free of falls  Outcome: Progressing     Problem: Daily Care  Goal: Daily care needs are met  Outcome: Progressing     Problem: Psychosocial Needs  Goal: Demonstrates ability to cope with hospitalization/illness  Outcome: Progressing  Goal: Collaborate with me, my family, and caregiver to identify my specific goals  Outcome: Progressing     Problem: Discharge Barriers  Goal: My discharge needs are met  Outcome: Progressing     Problem: Skin  Goal: Decreased wound size/increased tissue granulation at next dressing change  Outcome: Progressing  Goal: Participates in plan/prevention/treatment measures  Outcome: Progressing  Goal: Prevent/manage excess moisture  Outcome: Progressing  Goal: Prevent/minimize sheer/friction injuries  Outcome: Progressing  Goal: Promote/optimize nutrition  Outcome: Progressing  Goal: Promote skin healing  Outcome: Progressing     Problem: Pain  Goal: Takes deep breaths with improved pain control throughout the shift  Outcome: Progressing  Goal: Turns in bed with improved pain control throughout the shift  Outcome: Progressing  Goal: Walks with improved pain control throughout the shift  Outcome: Progressing  Goal: Performs ADL's with improved pain control throughout shift  Outcome: Progressing  Goal: Participates in PT with improved pain control throughout the shift  Outcome: Progressing  Goal: Free from opioid side effects throughout the shift  Outcome: Progressing  Goal: Free from acute confusion related to pain meds throughout the shift  Outcome: Progressing   The patient's goals for the shift include pain relief    The clinical goals for the shift include Maintain berable level of pain

## 2024-04-25 VITALS
BODY MASS INDEX: 22.7 KG/M2 | DIASTOLIC BLOOD PRESSURE: 60 MMHG | OXYGEN SATURATION: 97 % | TEMPERATURE: 98.2 F | HEIGHT: 65 IN | RESPIRATION RATE: 18 BRPM | WEIGHT: 136.24 LBS | SYSTOLIC BLOOD PRESSURE: 137 MMHG | HEART RATE: 64 BPM

## 2024-04-25 LAB
ANION GAP SERPL CALC-SCNC: 13 MMOL/L (ref 10–20)
BASOPHILS # BLD MANUAL: 0 X10*3/UL (ref 0–0.1)
BASOPHILS NFR BLD MANUAL: 0 %
BUN SERPL-MCNC: 20 MG/DL (ref 6–23)
BURR CELLS BLD QL SMEAR: ABNORMAL
CALCIUM SERPL-MCNC: 8.5 MG/DL (ref 8.6–10.3)
CHLORIDE SERPL-SCNC: 104 MMOL/L (ref 98–107)
CO2 SERPL-SCNC: 24 MMOL/L (ref 21–32)
CREAT SERPL-MCNC: 0.51 MG/DL (ref 0.5–1.05)
DACRYOCYTES BLD QL SMEAR: ABNORMAL
EGFRCR SERPLBLD CKD-EPI 2021: >90 ML/MIN/1.73M*2
EOSINOPHIL # BLD MANUAL: 0.29 X10*3/UL (ref 0–0.7)
EOSINOPHIL NFR BLD MANUAL: 2 %
ERYTHROCYTE [DISTWIDTH] IN BLOOD BY AUTOMATED COUNT: 20.4 % (ref 11.5–14.5)
GLUCOSE SERPL-MCNC: 75 MG/DL (ref 74–99)
HCT VFR BLD AUTO: 30.4 % (ref 36–46)
HGB BLD-MCNC: 10.6 G/DL (ref 12–16)
HOLD SPECIMEN: NORMAL
IMM GRANULOCYTES # BLD AUTO: 0.77 X10*3/UL (ref 0–0.7)
IMM GRANULOCYTES NFR BLD AUTO: 5.3 % (ref 0–0.9)
LYMPHOCYTES # BLD MANUAL: 1.01 X10*3/UL (ref 1.2–4.8)
LYMPHOCYTES NFR BLD MANUAL: 7 %
MAGNESIUM SERPL-MCNC: 1.92 MG/DL (ref 1.6–2.4)
MCH RBC QN AUTO: 33.2 PG (ref 26–34)
MCHC RBC AUTO-ENTMCNC: 34.9 G/DL (ref 32–36)
MCV RBC AUTO: 95 FL (ref 80–100)
MONOCYTES # BLD MANUAL: 0.58 X10*3/UL (ref 0.1–1)
MONOCYTES NFR BLD MANUAL: 4 %
NEUTS SEG # BLD MANUAL: 12.53 X10*3/UL (ref 1.2–7)
NEUTS SEG NFR BLD MANUAL: 87 %
NRBC BLD-RTO: 0 /100 WBCS (ref 0–0)
PLATELET # BLD AUTO: 53 X10*3/UL (ref 150–450)
POTASSIUM SERPL-SCNC: 3.9 MMOL/L (ref 3.5–5.3)
RBC # BLD AUTO: 3.19 X10*6/UL (ref 4–5.2)
RBC MORPH BLD: ABNORMAL
SCHISTOCYTES BLD QL SMEAR: ABNORMAL
SODIUM SERPL-SCNC: 137 MMOL/L (ref 136–145)
TOTAL CELLS COUNTED BLD: 100
WBC # BLD AUTO: 14.4 X10*3/UL (ref 4.4–11.3)

## 2024-04-25 PROCEDURE — 80048 BASIC METABOLIC PNL TOTAL CA: CPT | Mod: IPSPLIT | Performed by: NURSE PRACTITIONER

## 2024-04-25 PROCEDURE — 36415 COLL VENOUS BLD VENIPUNCTURE: CPT | Mod: IPSPLIT | Performed by: NURSE PRACTITIONER

## 2024-04-25 PROCEDURE — 99233 SBSQ HOSP IP/OBS HIGH 50: CPT | Performed by: NURSE PRACTITIONER

## 2024-04-25 PROCEDURE — 83735 ASSAY OF MAGNESIUM: CPT | Mod: IPSPLIT | Performed by: NURSE PRACTITIONER

## 2024-04-25 PROCEDURE — 85027 COMPLETE CBC AUTOMATED: CPT | Mod: IPSPLIT | Performed by: NURSE PRACTITIONER

## 2024-04-25 PROCEDURE — 2500000005 HC RX 250 GENERAL PHARMACY W/O HCPCS: Mod: IPSPLIT | Performed by: NURSE PRACTITIONER

## 2024-04-25 PROCEDURE — 2500000006 HC RX 250 W HCPCS SELF ADMINISTERED DRUGS (ALT 637 FOR ALL PAYERS): Mod: IPSPLIT | Performed by: NURSE PRACTITIONER

## 2024-04-25 PROCEDURE — 2500000001 HC RX 250 WO HCPCS SELF ADMINISTERED DRUGS (ALT 637 FOR MEDICARE OP): Mod: IPSPLIT | Performed by: NURSE PRACTITIONER

## 2024-04-25 PROCEDURE — 2500000001 HC RX 250 WO HCPCS SELF ADMINISTERED DRUGS (ALT 637 FOR MEDICARE OP): Mod: IPSPLIT

## 2024-04-25 PROCEDURE — 2500000004 HC RX 250 GENERAL PHARMACY W/ HCPCS (ALT 636 FOR OP/ED): Mod: IPSPLIT | Performed by: NURSE PRACTITIONER

## 2024-04-25 PROCEDURE — 85007 BL SMEAR W/DIFF WBC COUNT: CPT | Mod: IPSPLIT | Performed by: NURSE PRACTITIONER

## 2024-04-25 RX ORDER — PREDNISONE 20 MG/1
20 TABLET ORAL DAILY
Qty: 3 TABLET | Refills: 0 | Status: SHIPPED | OUTPATIENT
Start: 2024-04-26 | End: 2024-04-29

## 2024-04-25 RX ADMIN — HYDROMORPHONE HYDROCHLORIDE 2 MG: 2 TABLET ORAL at 06:49

## 2024-04-25 RX ADMIN — HYDROMORPHONE HYDROCHLORIDE 2 MG: 2 TABLET ORAL at 18:58

## 2024-04-25 RX ADMIN — AMIODARONE HYDROCHLORIDE 400 MG: 200 TABLET ORAL at 08:04

## 2024-04-25 RX ADMIN — HYDROMORPHONE HYDROCHLORIDE 2 MG: 2 TABLET ORAL at 09:16

## 2024-04-25 RX ADMIN — PREDNISONE 40 MG: 20 TABLET ORAL at 08:04

## 2024-04-25 RX ADMIN — LIDOCAINE 4% 2 PATCH: 40 PATCH TOPICAL at 08:03

## 2024-04-25 RX ADMIN — GABAPENTIN 300 MG: 300 CAPSULE ORAL at 16:23

## 2024-04-25 ASSESSMENT — PAIN SCALES - GENERAL
PAINLEVEL_OUTOF10: 5 - MODERATE PAIN
PAINLEVEL_OUTOF10: 3
PAINLEVEL_OUTOF10: 7
PAINLEVEL_OUTOF10: 7
PAINLEVEL_OUTOF10: 10 - WORST POSSIBLE PAIN
PAINLEVEL_OUTOF10: 10 - WORST POSSIBLE PAIN
PAINLEVEL_OUTOF10: 0 - NO PAIN

## 2024-04-25 ASSESSMENT — COGNITIVE AND FUNCTIONAL STATUS - GENERAL
EATING MEALS: A LITTLE
TURNING FROM BACK TO SIDE WHILE IN FLAT BAD: A LOT
MOVING FROM LYING ON BACK TO SITTING ON SIDE OF FLAT BED WITH BEDRAILS: A LOT
WALKING IN HOSPITAL ROOM: A LOT
HELP NEEDED FOR BATHING: A LOT
DRESSING REGULAR UPPER BODY CLOTHING: A LOT
DAILY ACTIVITIY SCORE: 13
TOILETING: A LOT
MOBILITY SCORE: 11
MOVING TO AND FROM BED TO CHAIR: A LOT
STANDING UP FROM CHAIR USING ARMS: A LOT
DRESSING REGULAR LOWER BODY CLOTHING: A LOT
PERSONAL GROOMING: A LOT
CLIMB 3 TO 5 STEPS WITH RAILING: TOTAL

## 2024-04-25 ASSESSMENT — PAIN DESCRIPTION - ORIENTATION: ORIENTATION: RIGHT

## 2024-04-25 ASSESSMENT — PAIN DESCRIPTION - LOCATION: LOCATION: RIB CAGE

## 2024-04-25 ASSESSMENT — PAIN - FUNCTIONAL ASSESSMENT
PAIN_FUNCTIONAL_ASSESSMENT: 0-10

## 2024-04-25 NOTE — PROGRESS NOTES
"Malathi Rubio is a 68 y.o. female on day 9 of admission presenting with Syncope.    Subjective     No overnight events reported. She is sitting up in bed this morning, alert and pleasant.  at bedside. They are [facility] to [facility] to [facility] today. Will await clarity on availability. They are both happy about their decision and positive about the transfer. All questions answered.      Objective     Physical Exam  Constitutional:       General: She is not in acute distress.     Appearance: She is toxic-appearing.   HENT:      Head: Normocephalic and atraumatic.      Mouth/Throat:      Mouth: Mucous membranes are moist.   Eyes:      Conjunctiva/sclera: Conjunctivae normal.   Cardiovascular:      Rate and Rhythm: Normal rate. Rhythm irregular.   Pulmonary:      Effort: Pulmonary effort is normal. No respiratory distress.      Comments: Diminished breath sounds  Abdominal:      General: There is no distension.      Palpations: Abdomen is soft. There is no mass.      Tenderness: There is no abdominal tenderness. There is no guarding.   Musculoskeletal:         General: Swelling present.   Skin:     General: Skin is warm and dry.      Coloration: Skin is pale.   Neurological:      Mental Status: She is alert and oriented to person, place, and time. Mental status is at baseline.   Psychiatric:         Mood and Affect: Mood normal.         Behavior: Behavior normal.     Last Recorded Vitals  Blood pressure 129/63, pulse 67, temperature 36.8 °C (98.2 °F), temperature source Temporal, resp. rate 16, height 1.651 m (5' 5\"), weight 61.8 kg (136 lb 3.9 oz), SpO2 97%.  Intake/Output last 3 Shifts:  I/O last 3 completed shifts:  In: 240 (3.9 mL/kg) [P.O.:240]  Out: - (0 mL/kg)   Weight: 61.8 kg         Scheduled medications  amiodarone, 400 mg, oral, Daily   Followed by  [START ON 5/1/2024] amiodarone, 200 mg, oral, Daily  gabapentin, 300 mg, oral, TID  HYDROmorphone, 16 mg, oral, q24h CHEIKH  lidocaine, 2 patch, " transdermal, Daily  magnesium sulfate, 2 g, intravenous, Daily  oxygen, , inhalation, Continuous - Inhalation  pantoprazole, 40 mg, oral, Daily before breakfast  polyethylene glycol, 17 g, oral, Daily  predniSONE, 40 mg, oral, Daily  sennosides-docusate sodium, 2 tablet, oral, BID    Continuous medications  sodium chloride 0.9%, 10 mL/hr, Last Rate: 10 mL/hr (04/24/24 0132)    PRN medications  PRN medications: acetaminophen, acetaminophen, alum-mag hydroxide-simeth, benzocaine-menthol, dextromethorphan-guaifenesin, dextrose, dextrose, glucagon, glucagon, guaiFENesin, HYDROmorphone, ipratropium-albuteroL, ketorolac, magnesium oxide, methocarbamol, metoprolol, ondansetron, ondansetron **OR** [DISCONTINUED] ondansetron, sodium chloride 0.9%    Relevant Results  Electrocardiogram, 12-lead PRN ACS symptoms  Result Date: 4/22/2024  Normal sinus rhythm Nonspecific ST and T wave abnormality Abnormal ECG When compared with ECG of 18-APR-2024 00:47, (unconfirmed) Questionable change in QRS axis T wave inversion now evident in Inferior leads Nonspecific T wave abnormality now evident in Lateral leads    MR brain w and wo IV contrast  Result Date: 4/19/2024  1. Limited, motion degraded examination. There are several foci of abnormal signal on diffusion-weighted imaging within the right frontal lobe into a lesser extent the bilateral parietal lobes suspicious for areas of acute to subacute ischemic injury. The distribution raises the possibility of an embolic process. 2. There is curvilinear signal abnormality on a single FLAIR image within right frontal sulci which could be due to artifact. However, subarachnoid hemorrhage or proteinaceous material could also give this appearance and follow-up head CT is recommended. 3. Dural-based enhancing mass overlying the left frontal lobe is nonspecific but could represent a meningioma although given the patient's history dural metastasis is difficult to entirely exclude. 4. Nonspecific  white matter changes most likely reflect small-vessel ischemic disease in a patient of this age and also involve the vicki. Small areas of remote ischemic injury are also suspected in the cerebellum.       MACRO: Critical Finding:  See findings. Notification was initiated on 4/19/2024 at 1:51 pm by  Opal Seo.  (**-OCF-**)   Signed by: Opal Seo 4/19/2024 1:53 PM Dictation workstation:   [id number]    CT chest abdomen pelvis w IV contrast  Result Date: 4/13/2024  There is a large necrotic cavitary mass in the right posterior thorax measuring 5.1 x 6.6 x 8.3 cm. There is osseous destruction and pathologic fracture of the posterior 5th and 6th ribs. A 1.8 x 2.7 cm spiculated nodule is noted in the left lung apex. Findings likely relate to patient's provided history of carcinoma. Correlate with prior workup.   Advanced centrilobular and paraseptal emphysema noted. Moderate right pleural effusion with areas of loculation. Adjacent airspace opacity favored to relate to compressive atelectasis. Superimposed infection not excluded. Correlate clinically.   There is a large heterogeneous hypoattenuating subcarinal mass measuring 4.3 x 4.2 cm with significant mass-effect on the esophagus with loss of fat planes with the esophagus. This is concerning for necrotic lymph node with local invasion not excluded.   There is a 2 cm heterogeneous hypodense nodule in the left adrenal gland concerning for metastases.   There is a 5.5 x 6.9 x 5.2 cm soft tissue mass in the left scapula with osseous destruction of the scapular body, incompletely imaged. There is diffuse scattered sclerotic lesions throughout the axial and appendicular skeletal system consistent with osseous metastases.   Additional findings as described above.     MACRO: None   Signed by: Clau Mcdonald 4/13/2024 11:10 PM Dictation workstation:   [id number]    CT head wo IV contrast  Result Date: 4/13/2024  No acute intracranial abnormality.   Chronic  changes as noted above.   MACRO: None   Signed by: Clau Mcdonald 4/13/2024 10:46 PM Dictation workstation:   [id number]      Latest Reference Range & Units 04/23/24 05:39 04/24/24 05:19 04/25/24 06:27   GLUCOSE 74 - 99 mg/dL 92 82 75   SODIUM 136 - 145 mmol/L 135 (L) 137 137   POTASSIUM 3.5 - 5.3 mmol/L 3.3 (L) 3.4 (L) 3.9   CHLORIDE 98 - 107 mmol/L 103 104 104   Bicarbonate 21 - 32 mmol/L 24 26 24   Anion Gap 10 - 20 mmol/L 11 10 13   Blood Urea Nitrogen 6 - 23 mg/dL 13 19 20   Creatinine 0.50 - 1.05 mg/dL 0.44 (L) 0.51 0.51   EGFR >60 mL/min/1.73m*2 >90 >90 >90   Calcium 8.6 - 10.3 mg/dL 8.3 (L) 8.8 8.5 (L)   MAGNESIUM 1.60 - 2.40 mg/dL 2.05 2.07 1.92   LEUKOCYTES (10*3/UL) IN BLOOD BY AUTOMATED COUNT, French 4.4 - 11.3 x10*3/uL 9.4 12.3 (H) 14.4 (H)   nRBC 0.0 - 0.0 /100 WBCs 0.0 0.0 0.0   ERYTHROCYTES (10*6/UL) IN BLOOD BY AUTOMATED COUNT, French 4.00 - 5.20 x10*6/uL 3.26 (L) 3.46 (L) 3.19 (L)   HEMOGLOBIN 12.0 - 16.0 g/dL 10.4 (L) 10.8 (L) 10.6 (L)   HEMATOCRIT 36.0 - 46.0 % 30.4 (L) 33.1 (L) 30.4 (L)   MCV 80 - 100 fL 93 96 95   MCH 26.0 - 34.0 pg 31.9 31.2 33.2   MCHC 32.0 - 36.0 g/dL 34.2 32.6 34.9   RED CELL DISTRIBUTION WIDTH 11.5 - 14.5 % 19.0 (H) 19.9 (H) 20.4 (H)   PLATELETS (10*3/UL) IN BLOOD AUTOMATED COUNT, French 150 - 450 x10*3/uL 37 (LL) 57 (L) 53 (L)     Assessment/Plan   Principal Problem:    Syncope  Active Problems:    Acute pain    Small cell lung cancer (Multi)    Malignant neoplasm metastatic to bone (Multi)    COPD (chronic obstructive pulmonary disease) (Multi)    Acute on chronic anemia    Community acquired bacterial pneumonia    Malnutrition (Multi)    PUD (peptic ulcer disease)    Thrombocytopenia (CMS-HCC)    Mixed hyperlipidemia    #Lung cancer with metastases to bone, brain, liver  #COPD, in acute exacerbation 2/2 pneumonia, bacterial (improving)   #Acute hypoxic respiratory failure (resolved)   - Patient follows with hem/onc at [place]  - WBC up to 14.4 (on steroids)   -  COVID, flu, RSV negative    - Procal 0.43   - BCx negative- final report   - CT chest with large necrotic cavitary mass in the right posterior thorax with osseous destruction; advanced emphysema, airspace opacity favored to represent compressive atelectasis    - Completed 6 days of Zosyn, changed to Augmentin x 2 days   - Solumedrol 40 mg IVP transitioned to prednisone PO   - Home hydromorphone brought in for pain 2/2 metastases; continue hydromorphone scheduled and PRN   - Continue gabapentin TID, lidocaine patches   - DuoNebs q4h PRN   - Mucinex BID PRN   - Tylenol PRN for fever   - RT eval and treat protocol, Bronchial hygiene  - Monitor SpO2 continuously, baseline O2 None   - Wean O2 as tolerated  - Pulmonology and hematology/oncology consulted, appreciate recs   - Code status discussion completed with patient and spouse 4/17; now DNRCCA/DNI   - MRI Brain 4/19: There are several foci of abnormal signal on diffusion-weighted imaging within the right frontal lobe into a lesser extent the bilateral parietal lobes suspicious for areas of acute to subacute ischemic injury. The distribution raises the possibility of an embolic process. There is curvilinear signal abnormality on a single FLAIR image within right frontal sulci which could be due to artifact. However, subarachnoid hemorrhage or proteinaceous material could also give this appearance and follow-up head CT is recommended. Dural-based enhancing mass overlying the left frontal lobe is nonspecific but could represent a meningioma although given the patient's history dural metastasis is difficult to entirely exclude. 4. Nonspecific white matter changes most likely reflect small-vessel ischemic disease in a patient of this age and also involve the vicki. Small areas of remote ischemic injury are also suspected in the cerebellum.    - Neuro on call consulted to review results of MRI brain, no further recommendations at this time given metastatic disease with poor  prognosis      #Hypokalemia (improving)   #Hypomagnesemia (resolved)  - K 3.1 > 3.9 today, replete per protocol   - Mag 2.07 > 2.05 today   - Per oncology, patient should receive magnesium sulfate 2g IV daily regardless of mag level; held today for poor IV access- patient meeting with hospice tomorrow. Will start Mag-Ox 400 mg PO BID while no IV access  - Telemetry monitoring   - Encourage PO intake as tolerated  - Daily BMP and mag level     #AF RVR (improving)   #Non-MI elevated troponins   - Received Lopressor 5 mg IVP x 1 dose with improvement in HR   - Currently AF rate 100s   - Trop  98 > 92 > 80 > 154 > 75 > 68 > 53, patient denies chest pain   - Not on anticoagulation due to anemia  - Cardiology consulted, started amiodarone 400 mg PO every day x 2 weeks then 200 mg PO every day   - PRN metoprolol as needed for HR control  - Telemetry monitoring    #Syncope (resolved)  #Acute on chronic anemia (improving)   #Thrombocytopenia (improving)   - Patient had syncopal episode at home while on the toilet; has had poor oral intake at home   - CT head with no acute intracranial abnormality  - H/H stable, 10.2/31.4 > 10.6/30.4 today   - plts 82 >>>53   - Anticoagulation/DVT ppx held  - Patient received 1 unit pRBCs in the ED and again 4/17   - B12 1232, folate 12.9, iron 22, ferritin 2540, % saturation not calculated  - Encourage PO intake as tolerated  - Telemetry monitoring  - Daily CBC      #Malnutrition, severe 2/2 active malignancy  - Albumin 2.4   - Regular diet as tolerated with supplements per nutrition   - Hypoglycemia protocol ordered  - Nutrition consulted, appreciate recs     DVT PPx: SCDs   GI PPx: Protonix  Diet: Regular  Code Status: DNRCCA/DNI      Disposition: Patient requires inpatient management at this time.  Possible discharge to hospice house today     Total accumulated time spent face to face and not face to face preparing to see the patient, obtaining and reviewing separately obtained history;  performing a medically appropriate examination and/or evaluation; counseling and educating the patient, family; ordering medications, tests, or procedures; referring and communicating with other health care professionals; documenting clinical information in the patient's medical record; independently interpreting results and communicating the results to the patient, family; and care coordination. 35 minutes    LUIS Krause-CNP

## 2024-04-25 NOTE — CARE PLAN
The patient's goals for the shift include pain relief    The clinical goals for the shift include Patient will be free from injury throughout this shift.      Problem: Pain  Goal: My pain/discomfort is manageable  Outcome: Progressing     Problem: Safety  Goal: Patient will be injury free during hospitalization  Outcome: Progressing  Goal: I will remain free of falls  Outcome: Progressing     Problem: Daily Care  Goal: Daily care needs are met  Outcome: Progressing     Problem: Psychosocial Needs  Goal: Demonstrates ability to cope with hospitalization/illness  Outcome: Progressing  Goal: Collaborate with me, my family, and caregiver to identify my specific goals  Outcome: Progressing     Problem: Discharge Barriers  Goal: My discharge needs are met  Outcome: Progressing     Problem: Skin  Goal: Decreased wound size/increased tissue granulation at next dressing change  Outcome: Progressing  Flowsheets (Taken 4/25/2024 1111)  Decreased wound size/increased tissue granulation at next dressing change: Protective dressings over bony prominences  Goal: Participates in plan/prevention/treatment measures  Outcome: Progressing  Goal: Prevent/manage excess moisture  Outcome: Progressing  Goal: Prevent/minimize sheer/friction injuries  Outcome: Progressing  Goal: Promote/optimize nutrition  Outcome: Progressing  Goal: Promote skin healing  Outcome: Progressing     Problem: Pain  Goal: Takes deep breaths with improved pain control throughout the shift  Outcome: Progressing  Goal: Turns in bed with improved pain control throughout the shift  Outcome: Progressing  Goal: Walks with improved pain control throughout the shift  Outcome: Progressing  Goal: Performs ADL's with improved pain control throughout shift  Outcome: Progressing  Goal: Participates in PT with improved pain control throughout the shift  Outcome: Progressing  Goal: Free from opioid side effects throughout the shift  Outcome: Progressing  Goal: Free from acute  confusion related to pain meds throughout the shift  Outcome: Progressing

## 2024-04-25 NOTE — NURSING NOTE
[id number] all to [facility], states still no bed available for discharge. States will call us later with update

## 2024-04-25 NOTE — DISCHARGE SUMMARY
Discharge Diagnosis  Syncope    Discharge Meds     Your medication list      as of April 13, 2024  9:10 PM     You have not been prescribed any medications.       Test Results Pending At Discharge  Pending Labs       No current pending labs.          Hospital Course   Malathi Rubio is a 68 y.o. female with PMH of metastatic small cell lung cancer, HTN, malnutrition, COPD, upper GIB, PUD, anemia, gastritis, ex-smoker, TCP, HLD who presented to ED 4/13 with syncope while sitting on the toilet at home. She states she has not been eating or drinking much lately and has been feeling very weak. She recently also started on dilaudid at home for severe pain from bone mets and she stated this weakness started around the same time. On arrival to ED her workup showedK 3.0, albumin 2.9, lactate 5.3, troponin 86, H/H 8.1/26. Repeat H/H after IVF showed Hgb of 5.9 with platelets 73. Troponins trended up to 154 before trending down. She was given a PRBC transfusion in ED and her potassium was corrected. CT chest showed a large necrotic cavitary mass in the right posterior thorax measuring 5.1 x 6.6 x 8.3 cm with osseous destruction and pathologic fracture of the posterior 5th and 6th ribs, emphysema, right pleural effusion with areas of loculation, airspace opacity as well. She and her  originally requested transfer to [place] to continue care she was receiving there, but after 3 days waiting for a bed she decided to stay here for continued treatment for anemia, syncope and pneumonia.      Past Medical History  Medical History        Past Medical History:   Diagnosis Date    Anemia      Cancer (Multi)       small cell lung cancer    COPD (chronic obstructive pulmonary disease) (Multi)      Emphysema of lung (Multi)      Hypertension           Surgical History  Surgical History         Past Surgical History:   Procedure Laterality Date    APPENDECTOMY   01/29/2016     Appendectomy    LUNG LOBECTOMY   01/29/2016     Lung  Lobectomy    OTHER SURGICAL HISTORY   11/15/2013     Thoracoscopy (Therapeutic)         Social History  She reports that she has been smoking cigarettes. She has never used smokeless tobacco. She reports that she does not drink alcohol and does not use drugs.     Family History  Family History   No family history on file.      Allergies  Patient has no known allergies.    Pertinent Physical Exam At Time of Discharge  Physical Exam  Constitutional:       General: She is not in acute distress.     Appearance: She is toxic-appearing.   HENT:      Head: Normocephalic and atraumatic.      Mouth/Throat:      Mouth: Mucous membranes are moist.   Eyes:      Conjunctiva/sclera: Conjunctivae normal.   Cardiovascular:      Rate and Rhythm: Normal rate. Rhythm irregular.   Pulmonary:      Effort: Pulmonary effort is normal. No respiratory distress.      Comments: Diminished breath sounds  Abdominal:      General: There is no distension.      Palpations: Abdomen is soft. There is no mass.      Tenderness: There is no abdominal tenderness. There is no guarding.   Musculoskeletal:         General: Swelling present.   Skin:     General: Skin is warm and dry.      Coloration: Skin is pale.   Neurological:      Mental Status: She is alert and oriented to person, place, and time. Mental status is at baseline.   Psychiatric:         Mood and Affect: Mood normal.         Behavior: Behavior normal.      #Lung cancer with metastases to bone, brain, liver  #COPD, in acute exacerbation 2/2 pneumonia, bacterial (improving)   #Acute hypoxic respiratory failure (resolved)   - Patient follows with hem/onc at [facility]  - WBC up to 14.4 (on steroids)   - COVID, flu, RSV negative    - Procal 0.43   - BCx negative- final report   - CT chest with large necrotic cavitary mass in the right posterior thorax with osseous destruction; advanced emphysema, airspace opacity favored to represent compressive atelectasis    - Completed 6 days of Zosyn,  changed to Augmentin x 2 days   - Solumedrol 40 mg IVP transitioned to prednisone PO   - Home hydromorphone brought in for pain 2/2 metastases; continue hydromorphone scheduled and PRN   - Continue gabapentin TID, lidocaine patches   - DuoNebs q4h PRN   - Mucinex BID PRN   - Tylenol PRN for fever   - RT eval and treat protocol, Bronchial hygiene  - Monitor SpO2 continuously, baseline O2 None   - Wean O2 as tolerated  - Pulmonology and hematology/oncology consulted, appreciate recs   - Code status discussion completed with patient and spouse 4/17; now DNRCCA/DNI   - MRI Brain 4/19: There are several foci of abnormal signal on diffusion-weighted imaging within the right frontal lobe into a lesser extent the bilateral parietal lobes suspicious for areas of acute to subacute ischemic injury. The distribution raises the possibility of an embolic process. There is curvilinear signal abnormality on a single FLAIR image within right frontal sulci which could be due to artifact. However, subarachnoid hemorrhage or proteinaceous material could also give this appearance and follow-up head CT is recommended. Dural-based enhancing mass overlying the left frontal lobe is nonspecific but could represent a meningioma although given the patient's history dural metastasis is difficult to entirely exclude. 4. Nonspecific white matter changes most likely reflect small-vessel ischemic disease in a patient of this age and also involve the vicki. Small areas of remote ischemic injury are also suspected in the cerebellum.    - Neuro on call consulted to review results of MRI brain, no further recommendations at this time given metastatic disease with poor prognosis     Problem List:  #Hypokalemia (improving)   #Hypomagnesemia (resolved)  - K 3.1 > 3.9 today, replete per protocol   - Mag 2.07 > 2.05 today   - Per oncology, patient should receive magnesium sulfate 2g IV daily regardless of mag level; held today for poor IV access- patient  meeting with hospice tomorrow. Will start Mag-Ox 400 mg PO BID while no IV access  - Telemetry monitoring   - Encourage PO intake as tolerated  - Daily BMP and mag level  ---DC to Hospice care     #AF RVR (improving)   #Non-MI elevated troponins   - Received Lopressor 5 mg IVP x 1 dose with improvement in HR   - Currently AF rate 100s   - Trop  98 > 92 > 80 > 154 > 75 > 68 > 53, patient denies chest pain   - Not on anticoagulation due to anemia  - Cardiology consulted, started amiodarone 400 mg PO every day x 2 weeks then 200 mg PO every day   - PRN metoprolol as needed for HR control  - Telemetry monitoring  ---DC to Hospice care     #Syncope (resolved)  #Acute on chronic anemia (improving)   #Thrombocytopenia (improving)   - Patient had syncopal episode at home while on the toilet; has had poor oral intake at home   - CT head with no acute intracranial abnormality  - H/H stable, 10.2/31.4 > 10.6/30.4 today   - plts 82 >>>53   - Anticoagulation/DVT ppx held  - Patient received 1 unit pRBCs in the ED and again 4/17   - B12 1232, folate 12.9, iron 22, ferritin 2540, % saturation not calculated  - Encourage PO intake as tolerated  - Telemetry monitoring  - Daily CBC   ---DC to Hospice care     #Malnutrition, severe 2/2 active malignancy  - Albumin 2.4   - Regular diet as tolerated with supplements per nutrition   - Hypoglycemia protocol ordered  - Nutrition consulted, appreciate recs  ---DC to Hospice care    Stable for discharge to Hospice care. Malathi was admitted 4/16 with syncope, malnutrition and pneumonia. She was waiting for transfer while in the ED for over 2 days when she agreed to be admitted here for care in the interim. She subsequently developed atrial fibrillation  and was moved to ICU where we continued amiodarone, IV antibiotics and pain control for her cancer with metastasis. Oncology consulted with her here and talked about resuming chemotherapy if her functional status improved but with her  progressive metastasis, poor appetite and acute pain she has not been able to participate in PT/OT. After treatment completed for her pneumonia and her heart rate was controlled Malathi and her  Andrew decided to [facility] to [facility] discharge for pain control. She will discharge today. Medications continued to the discernment of Hospice staff. Total cumulative time spent in preparation of this discharge including documentation review, coordination of care with the medical team including PT/SW/care coordinators and treating consultants, discussion with patient and pertinent family members and finalization of prescriptions, follow-up appointments, and this discharge summary was approximately 45 minutes.     Outpatient Follow-Up  No future appointments.    Amrita Pinon, APRN-CNP

## 2024-04-25 NOTE — PROGRESS NOTES
04/25/24 1039   Discharge Planning   Home or Post Acute Services Post acute facilities (Rehab/SNF/etc)   Type of Post Acute Facility Services Other (Comment)  ([facility])   Type of Home Care Services Hospice   Patient expects to be discharged to: [facility] [place]   Does the patient need discharge transport arranged? Yes   RoundTrip coordination needed? No   Has discharge transport been arranged? Yes   What day is the transport expected? 04/25/24   What time is the transport expected? 1530     Patient will be transferring to [facility]: [facility].   time is 3:30 pm.  Radha Otero RN w/ [facility] setting up.  Medical team aware.

## 2024-04-25 NOTE — CARE PLAN
The patient's goals for the shift include pain relief    The clinical goals for the shift include Maintain berable level of pain    Over the shift, the patient did not make progress toward the following goals. Barriers to progression include . Recommendations to address these barriers include   Problem: Pain  Goal: My pain/discomfort is manageable  Outcome: Progressing     Problem: Safety  Goal: Patient will be injury free during hospitalization  Outcome: Progressing  Goal: I will remain free of falls  Outcome: Progressing     Problem: Daily Care  Goal: Daily care needs are met  Outcome: Progressing     Problem: Psychosocial Needs  Goal: Demonstrates ability to cope with hospitalization/illness  Outcome: Progressing  Goal: Collaborate with me, my family, and caregiver to identify my specific goals  Outcome: Progressing     Problem: Discharge Barriers  Goal: My discharge needs are met  Outcome: Progressing     Problem: Skin  Goal: Decreased wound size/increased tissue granulation at next dressing change  Outcome: Progressing  Goal: Participates in plan/prevention/treatment measures  Outcome: Progressing  Goal: Prevent/manage excess moisture  Outcome: Progressing  Goal: Prevent/minimize sheer/friction injuries  Outcome: Progressing  Goal: Promote/optimize nutrition  Outcome: Progressing  Goal: Promote skin healing  Outcome: Progressing     Problem: Pain  Goal: Takes deep breaths with improved pain control throughout the shift  Outcome: Progressing  Goal: Turns in bed with improved pain control throughout the shift  Outcome: Progressing  Goal: Walks with improved pain control throughout the shift  Outcome: Progressing  Goal: Performs ADL's with improved pain control throughout shift  Outcome: Progressing  Goal: Participates in PT with improved pain control throughout the shift  Outcome: Progressing  Goal: Free from opioid side effects throughout the shift  Outcome: Progressing  Goal: Free from acute confusion related  to pain meds throughout the shift  Outcome: Progressing   .

## 2024-04-25 NOTE — PROGRESS NOTES
"Malathi Rubio is a 68 y.o. female on day 9 of admission presenting with Syncope.    Subjective     No overnight events reported.     Patient resting in bed with  at bedside this morning, she remains on room air. She reports back pain that improved with scheduled pain medications. She had low urine output yesterday and overnight; bladder scan showed < 200 ml. She has had very poor oral intake over the last 24 hours; encouraged her to drink fluids as tolerated.She was able to void an unmeasured amount this morning and declines Berger catheter at this time.  Discussed plan for [facility] for symptom management with patient and spouse, all questions answered. They are comfortable with their decision to pursue hospice care at this time.        Objective     Physical Exam  Constitutional:       General: She is not in acute distress.     Appearance: She is ill-appearing.   HENT:      Head: Normocephalic and atraumatic.      Mouth/Throat:      Mouth: Mucous membranes are dry.   Eyes:      Conjunctiva/sclera: Conjunctivae normal.   Cardiovascular:      Rate and Rhythm: Normal rate and regular rhythm.   Pulmonary:      Effort: No respiratory distress.      Breath sounds: No wheezing or rales.      Comments: Very diminished breath sounds  Abdominal:      General: There is no distension.      Palpations: Abdomen is soft.      Tenderness: There is no abdominal tenderness.   Musculoskeletal:         General: No swelling.   Skin:     General: Skin is warm and dry.      Coloration: Skin is pale.   Neurological:      Mental Status: She is alert. Mental status is at baseline.      Motor: Weakness present.   Psychiatric:         Mood and Affect: Mood normal.         Behavior: Behavior normal.         Last Recorded Vitals  Blood pressure 140/64, pulse 64, temperature 36.5 °C (97.7 °F), temperature source Temporal, resp. rate 19, height 1.651 m (5' 5\"), weight 61.8 kg (136 lb 3.9 oz), SpO2 97%.  Intake/Output " last 3 Shifts:  I/O last 3 completed shifts:  In: 240 (3.9 mL/kg) [P.O.:240]  Out: - (0 mL/kg)   Weight: 61.8 kg     Relevant Results      Scheduled medications  amiodarone, 400 mg, oral, Daily   Followed by  [START ON 5/1/2024] amiodarone, 200 mg, oral, Daily  gabapentin, 300 mg, oral, TID  HYDROmorphone, 16 mg, oral, q24h CHEIKH  lidocaine, 2 patch, transdermal, Daily  magnesium sulfate, 2 g, intravenous, Daily  oxygen, , inhalation, Continuous - Inhalation  pantoprazole, 40 mg, oral, Daily before breakfast  polyethylene glycol, 17 g, oral, Daily  predniSONE, 40 mg, oral, Daily  sennosides-docusate sodium, 2 tablet, oral, BID      Continuous medications  sodium chloride 0.9%, 10 mL/hr, Last Rate: 10 mL/hr (04/24/24 0132)      PRN medications  PRN medications: acetaminophen, acetaminophen, alum-mag hydroxide-simeth, benzocaine-menthol, dextromethorphan-guaifenesin, dextrose, dextrose, glucagon, glucagon, guaiFENesin, HYDROmorphone, ipratropium-albuteroL, ketorolac, magnesium oxide, methocarbamol, metoprolol, ondansetron, ondansetron **OR** [DISCONTINUED] ondansetron, sodium chloride 0.9%    Results for orders placed or performed during the hospital encounter of 04/13/24 (from the past 24 hour(s))   Basic Metabolic Panel   Result Value Ref Range    Glucose 75 74 - 99 mg/dL    Sodium 137 136 - 145 mmol/L    Potassium 3.9 3.5 - 5.3 mmol/L    Chloride 104 98 - 107 mmol/L    Bicarbonate 24 21 - 32 mmol/L    Anion Gap 13 10 - 20 mmol/L    Urea Nitrogen 20 6 - 23 mg/dL    Creatinine 0.51 0.50 - 1.05 mg/dL    eGFR >90 >60 mL/min/1.73m*2    Calcium 8.5 (L) 8.6 - 10.3 mg/dL   CBC and Auto Differential   Result Value Ref Range    WBC 14.4 (H) 4.4 - 11.3 x10*3/uL    nRBC 0.0 0.0 - 0.0 /100 WBCs    RBC 3.19 (L) 4.00 - 5.20 x10*6/uL    Hemoglobin 10.6 (L) 12.0 - 16.0 g/dL    Hematocrit 30.4 (L) 36.0 - 46.0 %    MCV 95 80 - 100 fL    MCH 33.2 26.0 - 34.0 pg    MCHC 34.9 32.0 - 36.0 g/dL    RDW 20.4 (H) 11.5 - 14.5 %    Platelets 53  (L) 150 - 450 x10*3/uL    Immature Granulocytes %, Automated 5.3 (H) 0.0 - 0.9 %    Immature Granulocytes Absolute, Automated 0.77 (H) 0.00 - 0.70 x10*3/uL   Magnesium   Result Value Ref Range    Magnesium 1.92 1.60 - 2.40 mg/dL   Lavender Top   Result Value Ref Range    Extra Tube Hold for add-ons.    Manual Differential   Result Value Ref Range    Neutrophils %, Manual 87.0 40.0 - 80.0 %    Lymphocytes %, Manual 7.0 13.0 - 44.0 %    Monocytes %, Manual 4.0 2.0 - 10.0 %    Eosinophils %, Manual 2.0 0.0 - 6.0 %    Basophils %, Manual 0.0 0.0 - 2.0 %    Seg Neutrophils Absolute, Manual 12.53 (H) 1.20 - 7.00 x10*3/uL    Lymphocytes Absolute, Manual 1.01 (L) 1.20 - 4.80 x10*3/uL    Monocytes Absolute, Manual 0.58 0.10 - 1.00 x10*3/uL    Eosinophils Absolute, Manual 0.29 0.00 - 0.70 x10*3/uL    Basophils Absolute, Manual 0.00 0.00 - 0.10 x10*3/uL    Total Cells Counted 100     RBC Morphology See Below     RBC Fragments Few     Teardrop Cells Few     Abel Cells Few        Electrocardiogram, 12-lead PRN ACS symptoms    Result Date: 4/22/2024  Normal sinus rhythm Nonspecific ST and T wave abnormality Abnormal ECG When compared with ECG of 18-APR-2024 00:47, (unconfirmed) Questionable change in QRS axis T wave inversion now evident in Inferior leads Nonspecific T wave abnormality now evident in Lateral leads    MR brain w and wo IV contrast    Result Date: 4/19/2024  Interpreted By:  Opal Seo, STUDY: MR BRAIN W AND WO IV CONTRAST;  4/19/2024 1:25 pm   INDICATION: Signs/Symptoms:history of brain cancer with mets.   COMPARISON: Head CT April 13, 2024.   ACCESSION NUMBER(S): [id number]   ORDERING CLINICIAN: DEE LEE   TECHNIQUE: Axial T2, FLAIR, DWI, gradient echo T2 and  T1 weighted images of brain were acquired. Post contrast T1 weighted images were acquired after administration of 12 mL Dotarem based intravenous contrast.   FINDINGS: The examination is somewhat degraded by patient motion.   CSF Spaces: There  is prominence of ventricles and sulci compatible with diffuse parenchymal volume loss. There is a nodular, dural-based, enhancing focus overlying the left frontal lobe measuring approximately 5 mm. Additionally, the motion degraded FLAIR images demonstrate curvilinear hyperintensity within right frontal sulci on image 6 of 28. There is no corresponding susceptibility artifact on gradient echo T2 weighted imaging or bright signal on T1 weighted imaging. Additionally, there is no hyperdensity in this region on prior head CT from April 13, 2024.   Parenchyma: There are scattered, small foci of increased signal on diffusion-weighted imaging primarily in the right frontal lobe. Punctate foci are also noted within bilateral parietal lobes. These are difficult to characterize due to small size. There is no obvious enhancement following contrast administration to suggest they represent metastasis. There are nonspecific hyperintensities on FLAIR and T2 weighted imaging in the subcortical and periventricular white matter with patchy involvement of the vicki. Prominent perivascular spaces and/or remote lacunar infarcts are demonstrated in the basal ganglia and thalami. There are small areas of encephalomalacia in the cerebellum. There is no mass effect or midline shift.   Paranasal Sinuses and Mastoids: Retention cysts or polyps are noted in the maxillary sinuses. There is opacification of several mastoid air cells, right greater than left.       1. Limited, motion degraded examination. There are several foci of abnormal signal on diffusion-weighted imaging within the right frontal lobe into a lesser extent the bilateral parietal lobes suspicious for areas of acute to subacute ischemic injury. The distribution raises the possibility of an embolic process. 2. There is curvilinear signal abnormality on a single FLAIR image within right frontal sulci which could be due to artifact. However, subarachnoid hemorrhage or proteinaceous  material could also give this appearance and follow-up head CT is recommended. 3. Dural-based enhancing mass overlying the left frontal lobe is nonspecific but could represent a meningioma although given the patient's history dural metastasis is difficult to entirely exclude. 4. Nonspecific white matter changes most likely reflect small-vessel ischemic disease in a patient of this age and also involve the vicki. Small areas of remote ischemic injury are also suspected in the cerebellum.       MACRO: Critical Finding:  See findings. Notification was initiated on 4/19/2024 at 1:51 pm by  Opal Seo.  (**-OCF-**)   Signed by: Opal Seo 4/19/2024 1:53 PM Dictation workstation:   [id number]    Electrocardiogram, 12-lead PRN ACS symptoms    Result Date: 4/18/2024  Normal sinus rhythm Normal ECG When compared with ECG of 16-APR-2024 13:31, (unconfirmed) Sinus rhythm has replaced Atrial fibrillation Vent. rate has decreased BY  65 BPM Non-specific change in ST segment in Lateral leads    Electrocardiogram, 12-lead PRN ACS symptoms    Result Date: 4/17/2024  Atrial fibrillation with rapid ventricular response Nonspecific ST abnormality Abnormal ECG When compared with ECG of 13-APR-2024 21:00, Atrial fibrillation has replaced Sinus rhythm Nonspecific T wave abnormality no longer evident in Inferior leads    ECG 12 lead    Result Date: 4/16/2024  Sinus tachycardia with occasional Premature ventricular complexes Nonspecific ST and T wave abnormality Abnormal ECG When compared with ECG of 10-AUG-2015 23:27, Premature ventricular complexes are now Present Vent. rate has increased BY  65 BPM ST now depressed in Anterior leads Nonspecific T wave abnormality now evident in Anterolateral leads See ED provider note for full interpretation and clinical correlation Confirmed by Denisa Looney ([id number]) on 4/16/2024 2:34:25 PM    ECG 12 lead    Result Date: 4/16/2024  Sinus tachycardia Otherwise normal ECG When compared  with ECG of 13-APR-2024 19:59, (unconfirmed) Premature ventricular complexes are no longer Present Nonspecific T wave abnormality now evident in Inferior leads Nonspecific T wave abnormality no longer evident in Lateral leads See ED provider note for full interpretation and clinical correlation Confirmed by Denisa Looney ([id number]) on 4/16/2024 2:26:29 PM    CT chest abdomen pelvis w IV contrast    Result Date: 4/13/2024  Interpreted By:  Clau Mcdonald, STUDY: CT CHEST ABDOMEN PELVIS W IV CONTRAST;  4/13/2024 10:05 pm   INDICATION: Signs/Symptoms:syncope with known small cell.   COMPARISON: CT scan of the abdomen and pelvis 08/11/2015.   ACCESSION NUMBER(S): [id number]   ORDERING CLINICIAN: YAIR FREEMAN   TECHNIQUE: Axial CT images of the chest, abdomen and pelvis with coronal and sagittal reconstructed images obtained after intravenous administration of 75 mL of Omnipaque 350.   FINDINGS: CHEST:   VESSELS: Aorta and pulmonary artery are normal caliber. Atherosclerotic changes in the aorta and arch vessels. HEART: Normal size. No pericardial effusion. Aortic root and mild coronary artery calcifications noted. MEDIASTINUM AND DARNELL: There is a large heterogeneous hypoattenuating subcarinal mass measuring 4.3 x 4.2 cm with significant mass-effect on the esophagus with loss of fat planes with the esophagus. This is concerning for necrotic lymph node with local invasion not excluded. No axillary or hilar adenopathy. Calcified mediastinal and right hilar lymph nodes noted. LUNG, PLEURA, LARGE AIRWAYS: Moderate right pleural effusion. Advanced centrilobular and paraseptal emphysema noted. Biapical pleuroparenchymal scarring. There is a large necrotic cavitary mass in the right mid thorax with osseous destruction of the 5th and 6th ribs with pathologic fracture. This measures approximately 5.1 x 6.6 x 8.3 cm. There is adjacent airspace opacity favored to relate to compressive atelectasis. A 1.8 x 2.7 cm spiculated  nodule is noted in the left lung apex. Scattered calcified granulomas noted bilaterally. Several small tracheal diverticulum noted incidentally. CHEST WALL AND LOWER NECK: Cavitary mass in the right mid posterior thorax as described above. BONES: There is a 5.5 x 6.9 x 5.2  cm soft tissue mass in the left scapula with osseous destruction of the scapular body, incompletely imaged. There is diffuse scattered sclerotic lesions throughout the axial and appendicular skeletal system consistent with metastases. Multilevel degenerative changes of the spine.   ABDOMEN:   LIVER: Within normal limits. BILE DUCTS: Normal caliber. GALLBLADDER: No calcified gallstones. No wall thickening. PANCREAS: Within normal limits. SPLEEN: Within normal limits. ADRENALS: There is a 2 cm heterogeneous hypodense nodule in the left adrenal gland concerning for metastases. Right adrenal glands within normal limits. KIDNEYS: Symmetric renal enhancement. No hydronephrosis or perinephric fluid collection. URETERS: No hydroureter.   VESSELS: Calcified and noncalcified atheromatous plaque is noted in the aortoiliac and mesenteric vessels. There is ectasia of the infrarenal aorta measuring 2.9 x 2.9 cm. RETROPERITONEUM: Within normal limits.   PELVIS:   REPRODUCTIVE ORGANS: Uterus is present. No adnexal mass. BLADDER: Under distended with apparent wall thickening.   BOWEL: Normal caliber. Appendix is not identified with certainty. No pericecal inflammatory changes seen. Moderate stool burden. No pneumatosis or portal venous gas. PERITONEUM: No ascites or free air, no fluid collection. There is mild presacral edema.   ABDOMINAL WALL: Mild body wall edema. BONES: Multilevel degenerative changes of the spine. Diffuse sclerotic lesions in the axial and appendicular skeletal system consistent with osseous metastases.       There is a large necrotic cavitary mass in the right posterior thorax measuring 5.1 x 6.6 x 8.3 cm. There is osseous destruction and  pathologic fracture of the posterior 5th and 6th ribs. A 1.8 x 2.7 cm spiculated nodule is noted in the left lung apex. Findings likely relate to patient's provided history of carcinoma. Correlate with prior workup.   Advanced centrilobular and paraseptal emphysema noted. Moderate right pleural effusion with areas of loculation. Adjacent airspace opacity favored to relate to compressive atelectasis. Superimposed infection not excluded. Correlate clinically.   There is a large heterogeneous hypoattenuating subcarinal mass measuring 4.3 x 4.2 cm with significant mass-effect on the esophagus with loss of fat planes with the esophagus. This is concerning for necrotic lymph node with local invasion not excluded.   There is a 2 cm heterogeneous hypodense nodule in the left adrenal gland concerning for metastases.   There is a 5.5 x 6.9 x 5.2 cm soft tissue mass in the left scapula with osseous destruction of the scapular body, incompletely imaged. There is diffuse scattered sclerotic lesions throughout the axial and appendicular skeletal system consistent with osseous metastases.   Additional findings as described above.     MACRO: None   Signed by: Clau Mcdonald 4/13/2024 11:10 PM Dictation workstation:   [id number]    CT head wo IV contrast    Result Date: 4/13/2024  Interpreted By:  Clau Mcdonald, STUDY: CT HEAD WO IV CONTRAST;  4/13/2024 9:55 pm   INDICATION: Signs/Symptoms:syncope.   COMPARISON: None.   ACCESSION NUMBER(S): [id number]   ORDERING CLINICIAN: YAIR FREEMAN   TECHNIQUE: Axial noncontrast CT images of the head.   FINDINGS: BRAIN PARENCHYMA: Gray-white matter interfaces are preserved. No mass, mass effect or midline shift. Moderate deep and periventricular white matter hypodensities are nonspecific, but favored to represent chronic small vessel ischemic changes.   HEMORRHAGE: No acute intracranial hemorrhage. VENTRICLES and EXTRA-AXIAL SPACES: Moderate volume loss with prominence of the ventricles  and sulci. EXTRACRANIAL SOFT TISSUES: Within normal limits. PARANASAL SINUSES/MASTOIDS: Partial opacification of the maxillary sinuses. Remainder of the visualized paranasal sinuses and mastoid air cells are aerated. CALVARIUM: No depressed skull fracture. No destructive osseous lesion.   OTHER FINDINGS: None.       No acute intracranial abnormality.   Chronic changes as noted above.   MACRO: None   Signed by: Clau Mcdonald 4/13/2024 10:46 PM Dictation workstation:   [id number]                    Assessment/Plan   Principal Problem:    Syncope  Active Problems:    Acute pain    Small cell lung cancer (Multi)    Malignant neoplasm metastatic to bone (Multi)    COPD (chronic obstructive pulmonary disease) (Multi)    Acute on chronic anemia    Community acquired bacterial pneumonia    Malnutrition (Multi)    PUD (peptic ulcer disease)    Thrombocytopenia (CMS-HCC)    Mixed hyperlipidemia    #Lung cancer with metastases to bone, brain, liver  #COPD, in acute exacerbation 2/2 pneumonia, bacterial (improving)   #Acute hypoxic respiratory failure (resolved)   - Patient follows with hem/onc at [place]  - WBC up to 12.3 (on steroids)   - COVID, flu, RSV negative    - Procal 0.43   - BCx negative- final report   - CT chest with large necrotic cavitary mass in the right posterior thorax with osseous destruction; advanced emphysema, airspace opacity favored to represent compressive atelectasis    - Completed 6 days of Zosyn, changed to Augmentin x 2 days   - Solumedrol 40 mg IVP transitioned to prednisone PO   - Home hydromorphone brought in for pain 2/2 metastases; continue hydromorphone scheduled and PRN   - Continue gabapentin TID, lidocaine patches   - DuoNebs q4h PRN   - Mucinex BID PRN   - Tylenol PRN for fever   - RT eval and treat protocol, Bronchial hygiene  - Monitor SpO2 continuously, baseline O2 None   - Wean O2 as tolerated  - Pulmonology and hematology/oncology consulted, appreciate recs   - Code status  discussion completed with patient and spouse 4/17; now DNRCCA/DNI   - MRI Brain 4/19: There are several foci of abnormal signal on diffusion-weighted imaging within the right frontal lobe into a lesser extent the bilateral parietal lobes suspicious for areas of acute to subacute ischemic injury. The distribution raises the possibility of an embolic process. There is curvilinear signal abnormality on a single FLAIR image within right frontal sulci which could be due to artifact. However, subarachnoid hemorrhage or proteinaceous material could also give this appearance and follow-up head CT is recommended. Dural-based enhancing mass overlying the left frontal lobe is nonspecific but could represent a meningioma although given the patient's history dural metastasis is difficult to entirely exclude. 4. Nonspecific white matter changes most likely reflect small-vessel ischemic disease in a patient of this age and also involve the vicki. Small areas of remote ischemic injury are also suspected in the cerebellum.    - Neuro on call consulted to review results of MRI brain, no further recommendations at this time given metastatic disease with poor prognosis   - Hem/onc in agreement with hospice care given poor prognosis, chemo-resistant cancer, likely bone marrow involvement    #Hypokalemia (improving)   #Hypomagnesemia (resolved)  - K 3.3 > 3.4 today, replete per protocol   - Mag 2.05 > 2.07 today   - Per oncology, patient should receive magnesium sulfate 2g IV daily regardless of mag level; held today for poor IV access- patient meeting with hospice tomorrow. Will start Mag-Ox 400 mg PO BID while no IV access  - Telemetry monitoring   - Encourage PO intake as tolerated  - Daily BMP and mag level     #AF RVR (improving)   #Non-MI elevated troponins   - Received Lopressor 5 mg IVP x 1 dose with improvement in HR   - Currently AF rate 100s   - Trop  98 > 92 > 80 > 154 > 75 > 68 > 53, patient denies chest pain   - Not on  anticoagulation due to anemia  - Cardiology consulted, started amiodarone 400 mg PO every day x 2 weeks then 200 mg PO every day   - PRN metoprolol as needed for HR control  - Telemetry monitoring     #Syncope (resolved)  #Acute on chronic anemia (improving)   #Thrombocytopenia (improving)   - Patient had syncopal episode at home while on the toilet; has had poor oral intake at home   - CT head with no acute intracranial abnormality  - H/H stable, 10.8/33.1 today   - plts 37 > 57 > 53  - Anticoagulation/DVT ppx held  - Patient received 1 unit pRBCs in the ED and again 4/17   - B12 1232, folate 12.9, iron 22, ferritin 2540, % saturation not calculated  - Encourage PO intake as tolerated  - Telemetry monitoring  - Daily CBC      #Malnutrition, severe 2/2 active malignancy  - Albumin 2.4   - Regular diet as tolerated with supplements per nutrition   - Hypoglycemia protocol ordered  - Nutrition consulted, appreciate recs     DVT PPx: SCDs   GI PPx: Protonix  Diet: Regular  Code Status: DNRCCA/DNI      Disposition: Patient requires inpatient management at this time.  Awaiting bed assignment at [facility].      Total accumulated time spent face to face and not face to face preparing to see the patient, obtaining and reviewing separately obtained history; performing a medically appropriate examination and/or evaluation; counseling and educating the patient, family; ordering medications, tests, or procedures; referring and communicating with other health care professionals; documenting clinical information in the patient's medical record; independently interpreting results and communicating the results to the patient, family; and care coordination was 35 minutes       Virgen Burr PA-C

## 2024-04-27 LAB
ATRIAL RATE: 234 BPM
ATRIAL RATE: 68 BPM
ATRIAL RATE: 81 BPM
P AXIS: 58 DEGREES
P AXIS: 67 DEGREES
P OFFSET: 207 MS
P OFFSET: 213 MS
P ONSET: 155 MS
P ONSET: 156 MS
PR INTERVAL: 134 MS
PR INTERVAL: 136 MS
Q ONSET: 221 MS
Q ONSET: 222 MS
Q ONSET: 224 MS
QRS COUNT: 11 BEATS
QRS COUNT: 13 BEATS
QRS COUNT: 22 BEATS
QRS DURATION: 70 MS
QRS DURATION: 80 MS
QRS DURATION: 80 MS
QT INTERVAL: 318 MS
QT INTERVAL: 382 MS
QT INTERVAL: 396 MS
QTC CALCULATION(BAZETT): 421 MS
QTC CALCULATION(BAZETT): 443 MS
QTC CALCULATION(BAZETT): 473 MS
QTC FREDERICIA: 413 MS
QTC FREDERICIA: 414 MS
QTC FREDERICIA: 422 MS
R AXIS: 46 DEGREES
R AXIS: 72 DEGREES
R AXIS: 9 DEGREES
T AXIS: -27 DEGREES
T AXIS: 29 DEGREES
T AXIS: 38 DEGREES
T OFFSET: 380 MS
T OFFSET: 413 MS
T OFFSET: 422 MS
VENTRICULAR RATE: 133 BPM
VENTRICULAR RATE: 68 BPM
VENTRICULAR RATE: 81 BPM

## 2024-09-21 NOTE — PROGRESS NOTES
Occupational Therapy                 Therapy Communication Note    Patient Name: Malathi Rubio  MRN: [id number]  Today's Date: 4/20/2024     Discipline: Occupational Therapy    Missed Visit Reason: OT withheld at this time per nursing recommendation due to bradycardia.    Missed Time: Attempt at 8:45AM       .